# Patient Record
Sex: FEMALE | Race: WHITE | NOT HISPANIC OR LATINO | Employment: OTHER | URBAN - METROPOLITAN AREA
[De-identification: names, ages, dates, MRNs, and addresses within clinical notes are randomized per-mention and may not be internally consistent; named-entity substitution may affect disease eponyms.]

---

## 2017-03-22 ENCOUNTER — HOSPITAL ENCOUNTER (OUTPATIENT)
Dept: CT IMAGING | Facility: CLINIC | Age: 47
Discharge: HOME | End: 2017-03-22
Attending: NURSE PRACTITIONER | Admitting: NURSE PRACTITIONER
Payer: COMMERCIAL

## 2017-03-22 ENCOUNTER — OFFICE VISIT (OUTPATIENT)
Dept: FAMILY MEDICINE | Facility: CLINIC | Age: 47
End: 2017-03-22
Payer: COMMERCIAL

## 2017-03-22 VITALS
OXYGEN SATURATION: 97 % | TEMPERATURE: 98.5 F | SYSTOLIC BLOOD PRESSURE: 133 MMHG | BODY MASS INDEX: 20.14 KG/M2 | WEIGHT: 118.7 LBS | DIASTOLIC BLOOD PRESSURE: 92 MMHG | HEART RATE: 70 BPM

## 2017-03-22 DIAGNOSIS — R10.31 RLQ ABDOMINAL PAIN: ICD-10-CM

## 2017-03-22 DIAGNOSIS — R10.31 RLQ ABDOMINAL PAIN: Primary | ICD-10-CM

## 2017-03-22 LAB
BASOPHILS # BLD AUTO: 0 10E9/L (ref 0–0.2)
BASOPHILS NFR BLD AUTO: 0.1 %
DIFFERENTIAL METHOD BLD: NORMAL
EOSINOPHIL # BLD AUTO: 0.1 10E9/L (ref 0–0.7)
EOSINOPHIL NFR BLD AUTO: 1.3 %
ERYTHROCYTE [DISTWIDTH] IN BLOOD BY AUTOMATED COUNT: 12.8 % (ref 10–15)
HCT VFR BLD AUTO: 39.5 % (ref 35–47)
HGB BLD-MCNC: 13.2 G/DL (ref 11.7–15.7)
LYMPHOCYTES # BLD AUTO: 1.7 10E9/L (ref 0.8–5.3)
LYMPHOCYTES NFR BLD AUTO: 21 %
MCH RBC QN AUTO: 30.5 PG (ref 26.5–33)
MCHC RBC AUTO-ENTMCNC: 33.4 G/DL (ref 31.5–36.5)
MCV RBC AUTO: 91 FL (ref 78–100)
MONOCYTES # BLD AUTO: 0.7 10E9/L (ref 0–1.3)
MONOCYTES NFR BLD AUTO: 8.3 %
NEUTROPHILS # BLD AUTO: 5.7 10E9/L (ref 1.6–8.3)
NEUTROPHILS NFR BLD AUTO: 69.3 %
PLATELET # BLD AUTO: 266 10E9/L (ref 150–450)
RBC # BLD AUTO: 4.33 10E12/L (ref 3.8–5.2)
WBC # BLD AUTO: 8.2 10E9/L (ref 4–11)

## 2017-03-22 PROCEDURE — 36415 COLL VENOUS BLD VENIPUNCTURE: CPT | Performed by: NURSE PRACTITIONER

## 2017-03-22 PROCEDURE — 74177 CT ABD & PELVIS W/CONTRAST: CPT

## 2017-03-22 PROCEDURE — 25500064 ZZH RX 255 OP 636: Performed by: NURSE PRACTITIONER

## 2017-03-22 PROCEDURE — 25000125 ZZHC RX 250: Performed by: NURSE PRACTITIONER

## 2017-03-22 PROCEDURE — 85025 COMPLETE CBC W/AUTO DIFF WBC: CPT | Performed by: NURSE PRACTITIONER

## 2017-03-22 PROCEDURE — 99214 OFFICE O/P EST MOD 30 MIN: CPT | Performed by: NURSE PRACTITIONER

## 2017-03-22 RX ORDER — IOPAMIDOL 755 MG/ML
100 INJECTION, SOLUTION INTRAVASCULAR ONCE
Status: COMPLETED | OUTPATIENT
Start: 2017-03-22 | End: 2017-03-22

## 2017-03-22 RX ADMIN — SODIUM CHLORIDE 55 ML: 9 INJECTION, SOLUTION INTRAVENOUS at 15:13

## 2017-03-22 RX ADMIN — IOPAMIDOL 58 ML: 755 INJECTION, SOLUTION INTRAVENOUS at 15:13

## 2017-03-22 NOTE — NURSING NOTE
"Chief Complaint   Patient presents with     Abdominal Pain       Initial BP (!) 133/92 (BP Location: Left arm, Patient Position: Chair, Cuff Size: Adult Regular)  Pulse 70  Temp 98.5  F (36.9  C) (Oral)  Wt 118 lb 11.2 oz (53.8 kg)  LMP 11/05/2012  SpO2 97%  BMI 20.14 kg/m2 Estimated body mass index is 20.14 kg/(m^2) as calculated from the following:    Height as of 11/17/16: 5' 4.37\" (1.635 m).    Weight as of this encounter: 118 lb 11.2 oz (53.8 kg).  Medication Reconciliation: complete       Guille King MA      "

## 2017-03-22 NOTE — MR AVS SNAPSHOT
After Visit Summary   3/22/2017    Gerardo Mandujano    MRN: [id number]           Patient Information     Date Of Birth          1970        Visit Information        Provider Department      3/22/2017 11:00 AM Liana Goyal APRN [facility]        Today's Diagnoses     RLQ abdominal pain    -  1       Follow-ups after your visit        Your next 10 appointments already scheduled     Mar 22, 2017  3:00 PM CDT   CT ABDOMEN W/O & W CONTRAST with URCT2   [facility], [place], Radiology ([facility])    [address] [place] [id number] [phone]           Please bring any scans or X-rays taken at other hospitals, if similar tests were done. Also bring a list of your medicines, including vitamins, minerals and over-the-counter drugs. It is safest to leave personal items at home.  Be sure to tell your doctor:   If you have any allergies.   If there s any chance you are pregnant.   If you are breastfeeding.   If you have any special needs.  You may have contrast for this exam. To prepare:   Do not eat or drink for 2 hours before your exam. If you need to take medicine, you may take it with small sips of water. (We may ask you to take liquid medicine as well.)   The day before your exam, drink extra fluids at least six 8-ounce glasses (unless your doctor tells you to restrict your fluids).  Patients over 70 or patients with diabetes or kidney problems:   If you haven t had a blood test (creatinine test) within the last 30 days, go to your clinic or Diagnostic Imaging Department for this test.  If you have diabetes:   If your kidney function is normal, continue taking your metformin (Avandamet, Glucophage, Glucovance, Metaglip) on the day of your exam.   If your kidney function is abnormal, wait 48 hours before restarting this medicine.  You will have oral contrast for this exam:   You will drink the contrast at home. Get this  from your clinic or Diagnostic Imaging Department. Please follow the directions given.  Please wear loose clothing, such as a sweat suit or jogging clothes. Avoid snaps, zippers and other metal. We may ask you to undress and put on a hospital gown.  If you have any questions, please call the Imaging Department where you will have your exam.              Future tests that were ordered for you today     Open Future Orders        Priority Expected Expires Ordered    CT Abdomen Pelvis w Contrast Routine  3/22/2018 3/22/2017            Who to contact     If you have questions or need follow up information about today's clinic visit or your schedule please contact [facility] [place] [place] directly at [phone].  Normal or non-critical lab and imaging results will be communicated to you by [employer], letter or phone within 4 business days after the clinic has received the results. If you do not hear from us within 7 days, please contact the clinic through [employer] or phone. If you have a critical or abnormal lab result, we will notify you by phone as soon as possible.  Submit refill requests through [employer] or call your pharmacy and they will forward the refill request to us. Please allow 3 business days for your refill to be completed.          Additional Information About Your Visit        [employer] OTR Information     [employer] gives you secure access to your electronic health record. If you see a primary care provider, you can also send messages to your care team and make appointments. If you have questions, please call your primary care clinic.  If you do not have a primary care provider, please call [phone] and they will assist you.        Care EveryWhere ID     This is your Care EveryWhere ID. This could be used by other organizations to access your [place] medical records  [id number]        Your Vitals Were     Pulse Temperature Last Period Pulse Oximetry BMI (Body Mass Index)       70 98.5  F (36.9  C) (Oral)  11/05/2012 97% 20.14 kg/m2        Blood Pressure from Last 3 Encounters:   03/22/17 (!) 133/92   11/17/16 136/87   10/02/15 112/68    Weight from Last 3 Encounters:   03/22/17 118 lb 11.2 oz (53.8 kg)   11/17/16 116 lb 14.4 oz (53 kg)   10/02/15 119 lb (54 kg)              We Performed the Following     CBC with platelets differential     JUST IN CASE        Primary Care Provider Office Phone # Fax #    Polina Joiner [phone]       [facility] [address] [place] [address]        Thank you!     Thank you for choosing [facility] [place]  for your care. Our goal is always to provide you with excellent care. Hearing back from our patients is one way we can continue to improve our services. Please take a few minutes to complete the written survey that you may receive in the mail after your visit with us. Thank you!             Your Updated Medication List - Protect others around you: Learn how to safely use, store and throw away your medicines at www.disposemymeds.org.          This list is accurate as of: 3/22/17  1:55 PM.  Always use your most recent med list.                   Brand Name Dispense Instructions for use    CALCIUM 1200 PO      Take  by mouth.       FERROUS GLUCONATE      Reported on 3/22/2017       ibuprofen 200 MG capsule      Take 200 mg by mouth as needed       VITAMIN D (CHOLECALCIFEROL) PO      Take  by mouth daily.

## 2017-03-22 NOTE — PROGRESS NOTES
SUBJECTIVE:                                                    Gerardo Mandujano is a 46 year old female who presents to clinic today for the following health issues:    ABDOMINAL PAIN     Onset: Monday March 20, 2017    Description:   Character: intense sharp and burning; coming and going  Location: right lower quadrant pelvic region  Radiation: None    Intensity: moderate - severe - did have moments of doubling over    Progression of Symptoms:  same    Accompanying Signs & Symptoms:  Fever/Chills: no   Gas/Bloating: no   Nausea: no   Vomitting: no   Diarrhea: YES- did have a loose bowel movement yesterday   Constipation:no   Dysuria or Hematuria: no   Fatigue: yes   History:   Trauma: no   Previous similar pain: YES   Previous tests done: none    Precipitating factors:   Does the pain change with:     Food: no      BM: no     Urination: no     Hysterectomy - previous MD thought might be muscle or ligament related    Alleviating factors:  None    Therapies Tried and outcome: Advil over the counter medication    LMP:  not applicable - hysterectomy - has ovaries and cervix     Questions/Concerns: None    Problem list and histories reviewed & adjusted, as indicated.  Additional history: as documented      Reviewed and updated as needed this visit by clinical staff       Reviewed and updated as needed this visit by Provider       ROS:  Constitutional, HEENT, cardiovascular, pulmonary, gi and gu systems are negative, except as otherwise noted.    OBJECTIVE:                                                    BP (!) 133/92 (BP Location: Left arm, Patient Position: Chair, Cuff Size: Adult Regular)  Pulse 70  Temp 98.5  F (36.9  C) (Oral)  Wt 118 lb 11.2 oz (53.8 kg)  LMP 11/05/2012  SpO2 97%  BMI 20.14 kg/m2  Body mass index is 20.14 kg/(m^2).  GENERAL:alert and fatigued  NECK: no adenopathy  RESP: lungs clear to auscultation - no rales, rhonchi or wheezes  CV: regular rate and rhythm, normal S1 S2, no S3 or S4, no  murmur, click or rub, no peripheral edema and peripheral pulses strong  ABDOMEN: tenderness RLQ, positive McBurney's, no organomegaly or masses, liver span normal to percussion and bowel sounds normal  SKIN: no suspicious lesions or rashes; diaphoretic and warm to touch  PSYCH: mentation appears normal, affect normal/bright    Diagnostic Test Results:  Results for orders placed or performed in visit on 03/22/17 (from the past 24 hour(s))   CBC with platelets differential   Result Value Ref Range    WBC 8.2 4.0 - 11.0 10e9/L    RBC Count 4.33 3.8 - 5.2 10e12/L    Hemoglobin 13.2 11.7 - 15.7 g/dL    Hematocrit 39.5 35.0 - 47.0 %    MCV 91 78 - 100 fl    MCH 30.5 26.5 - 33.0 pg    MCHC 33.4 31.5 - 36.5 g/dL    RDW 12.8 10.0 - 15.0 %    Platelet Count 266 150 - 450 10e9/L    Diff Method Automated Method     % Neutrophils 69.3 %    % Lymphocytes 21.0 %    % Monocytes 8.3 %    % Eosinophils 1.3 %    % Basophils 0.1 %    Absolute Neutrophil 5.7 1.6 - 8.3 10e9/L    Absolute Lymphocytes 1.7 0.8 - 5.3 10e9/L    Absolute Monocytes 0.7 0.0 - 1.3 10e9/L    Absolute Eosinophils 0.1 0.0 - 0.7 10e9/L    Absolute Basophils 0.0 0.0 - 0.2 10e9/L     CT pending     ASSESSMENT/PLAN:                                                      (R10.31) RLQ abdominal pain  (primary encounter diagnosis)  Comment:   Plan: CBC with platelets differential, JUST IN CASE,         CT Abdomen Pelvis w Contrast, CANCELED: CT         Abdomen w/o & w Contrast  Concern for acute appendicitis.  Discussed with the patient.  Less likely IBD, obstruction, or ovarian torsion.  No leukocytosis on CBC.  Same day CT pending.    MANUEL Lopez [facility]

## 2017-03-23 ENCOUNTER — MYC MEDICAL ADVICE (OUTPATIENT)
Dept: FAMILY MEDICINE | Facility: CLINIC | Age: 47
End: 2017-03-23

## 2017-03-23 ENCOUNTER — OFFICE VISIT (OUTPATIENT)
Dept: FAMILY MEDICINE | Facility: CLINIC | Age: 47
End: 2017-03-23
Payer: COMMERCIAL

## 2017-03-23 ENCOUNTER — HOSPITAL ENCOUNTER (OUTPATIENT)
Dept: ULTRASOUND IMAGING | Facility: CLINIC | Age: 47
Discharge: HOME | End: 2017-03-23
Attending: NURSE PRACTITIONER | Admitting: NURSE PRACTITIONER
Payer: COMMERCIAL

## 2017-03-23 DIAGNOSIS — R10.31 RLQ ABDOMINAL PAIN: ICD-10-CM

## 2017-03-23 DIAGNOSIS — R10.31 RLQ ABDOMINAL PAIN: Primary | ICD-10-CM

## 2017-03-23 PROCEDURE — 99214 OFFICE O/P EST MOD 30 MIN: CPT | Performed by: NURSE PRACTITIONER

## 2017-03-23 PROCEDURE — 76830 TRANSVAGINAL US NON-OB: CPT

## 2017-03-23 NOTE — PROGRESS NOTES
SUBJECTIVE:                                                    Gerardo Mandujano is a 46 year old female who presents to clinic today for the following health issues:      ABDOMINAL PAIN     Onset: 4 days ago    Change in symptoms: improvement    Description:   Character: Dull ache and Gnawing  Location: right lower quadrant  Radiation: None    Intensity: mild    Progression of Symptoms:  improving    Accompanying Signs & Symptoms:  Fever/Chills?: no   Gas/Bloating: no   Nausea: no   Vomitting: no   Diarrhea?: no   Constipation:no   Dysuria or Hematuria: no    History:   Trauma: no   Previous similar pain: no    Previous tests done: CT    Precipitating factors:   Does the pain change with:     Food: no      BM: no     Urination: no     Alleviating factors:  None known    Therapies Tried and outcome: ibuprofen    LMP:  not applicable - hysterectomy with ovaries and cervix intact       Problem list and histories reviewed & adjusted, as indicated.  Additional history: as documented    Reviewed and updated as needed this visit by clinical staff       Reviewed and updated as needed this visit by Provider         ROS:  Constitutional, HEENT, cardiovascular, pulmonary, gi and gu systems are negative, except as otherwise noted.    OBJECTIVE:                                                    /87  Pulse 78  Temp 96.4  F (35.8  C)  Wt 122 lb 11.2 oz (55.7 kg)  LMP 11/05/2012  SpO2 99%  BMI 20.82 kg/m2  Body mass index is 20.82 kg/(m^2).  GENERAL: healthy, alert and no distress  NECK: no adenopathy, no asymmetry, masses, or scars and thyroid normal to palpation  RESP: lungs clear to auscultation - no rales, rhonchi or wheezes  CV: regular rate and rhythm, normal S1 S2, no S3 or S4, no murmur, click or rub, no peripheral edema and peripheral pulses strong  ABDOMEN: tenderness RLQ, no organomegaly or masses, liver span normal to percussion, bowel sounds normal and no palpable or pulsatile masses  SKIN: no suspicious  lesions or rashes  PSYCH: mentation appears normal, affect normal/bright    Diagnostic Test Results:  HISTORY: Right lower quadrant pain     TECHNIQUE: The pelvis was scanned in standard fashion with  transabdominal and transvaginal transducer(s) using both grey scale  and color Doppler techniques.     FINDINGS  Postsurgical changes of supracervical hysterectomy. There is trace  simple free fluid in the pelvis.     The right ovary measures 2.1 x 2 x 2.5 cm and the left ovary measures  2.8 x 1.6 x 2.2 cm. There is no adnexal mass. Subcentimeter follicles  seen in both ovaries.          IMPRESSION:   Trace free fluid in the pelvis. Normal ultrasound of both ovaries.     ASSESSMENT/PLAN:                                                        (R10.31) RLQ abdominal pain  (primary encounter diagnosis)  Comment:   Plan: US Pelvic Complete with Transvaginal        CT showed signs of inflammation without appendicitis - suggested possible ruptured ovarian cyst.  Patient's symptoms have improved, but she would like to pursue the pelvic US to rule out ovarian cyst.  Discussed with OB-GYN - not typical clinical picture with chills and malaise. Pelvic US did not have free fluid, suggesting no ovarian cyst.  Patient course is improving.  Monitor to resolution.  If worsening, see general surgery.    See Patient Instructions    MANULE Lopez [facility]

## 2017-03-23 NOTE — MR AVS SNAPSHOT
After Visit Summary   3/23/2017    Gerardo Mandujano    MRN: [id number]           Patient Information     Date Of Birth          1970        Visit Information        Provider Department      3/23/2017 1:00 PM Liana Goyal APRN [facility]        Today's Diagnoses     RLQ abdominal pain    -  1      Care Instructions    Ultrasound today  I'll review it with OB-GYN  Likely no follow-up unless symptoms do not resolve or you have new or worsening symptoms      Understanding Ovarian Cysts  An ovarian cyst is a fluid-filled sac that forms on or inside an ovary. The ovaries are a pair of small, oval-shaped organs in the lower part of a woman s belly (abdomen). About once a month, one of the ovaries releases an egg. The ovaries also make the hormones estrogen and progesterone. These hormones are part of pregnancy, the menstrual cycle, and breast growth.  Ovarian cysts are very common in women of all ages. Young girls can also get them, but this is less common. There are different types of ovarian cysts. They can occur for various reasons, and they may need different treatments. A cyst can vary in size from half an inch to more than 4 inches.  Types of ovarian cysts  There are different types of ovarian cysts:  Functional cyst  This is the most common type of ovarian cyst. They only occur in women who haven t gone through menopause. There are 2 types of functional cysts:    Follicular cyst. This cyst happens when an egg isn t released and it keeps growing inside the ovary.    Corpus luteum cyst. This type of cyst occurs when the sac around the egg doesn t dissolve after the egg is released.  Endometrioma  This is a cyst filled with old blood and tissue from the lining of the uterus. They are often called chocolate cysts because of their dark color. They can happen in women with endometriosis.  Dermoid cyst  This cyst develops from ovarian cells and eggs. They may have hair, skin, or fat  in them. These cysts are common in women of childbearing age.  What causes ovarian cysts?  Cysts can also be caused by:    Polycystic ovary syndrome (PCOS), a condition that causes multiple cysts on the ovaries    Pregnancy    Severe pelvic infection, such as chlamydia    Noncancerous growths    Cancer (rare)    Using fertility medicine to cause ovulation, such as clomiphene  Symptoms of an ovarian cyst  Many women don t have any symptoms from the cyst. In women with symptoms, the most common is pain or pressure in your lower belly on the side of the cyst. This pain may be dull or sharp, and it may come and go. A cyst that breaks open (ruptures) may lead to sudden, sharp pain.  Other symptoms of an ovarian cyst can include:    Pain in the lower back or thighs    Trouble emptying your bladder fully    Pain during sex    Weight gain    Pain during your period    Breast tenderness    Abnormal vaginal bleeding (rare)  Diagnosing an ovarian cyst  Your primary care doctor or an obstetrics and gynecology (OB/GYN) doctor may diagnose the condition. Your doctor will ask about your health history and your symptoms. You will also have a physical exam. This will likely include a pelvic exam. During the pelvic exam, your doctor may feel the swelling on your ovary. In women with no symptoms, this is often the first sign of a cyst.  If your doctor thinks you may have an ovarian cyst, you may need tests. These can help your doctor learn the type of cyst. Tests can also help rule out other problems, such as an ectopic pregnancy. The tests may include:    Ultrasound. This test uses sound waves to view the size, shape, and location of the cyst. The test can also show if the growth is solid or filled with fluid.    MRI. This uses large magnets and a computer to create a detailed picture of the area.    Pregnancy test. This is done to check if pregnancy may be the cause of the cyst.    Blood tests. These check for hormone problems and  cancer. They also check if the cyst is bleeding.    Biopsy. This is a test where a tiny piece of the ovary is taken. The piece is examined in a lab for cancer cells. This may be done if an ultrasound shows a certain type of growth on the ovary.    7522-6998 [employer]. [address] [place] [address]. All rights reserved. This information is not intended as a substitute for professional medical care. Always follow your healthcare professional's instructions.        Treatment for Ovarian Cysts  An ovarian cyst is a fluid-filled sac that forms on or inside an ovary. The ovaries are a pair of small, oval-shaped organs in the lower part of a woman s belly (abdomen). About once a month, one of the ovaries releases an egg. The ovaries also make the hormones estrogen and progesterone. These hormones are part of pregnancy, the menstrual cycle, and breast growth.  Ovarian cysts are very common in women of all ages. Young girls can also get them, but this is less common. There are different types of ovarian cysts. They can occur for various reasons, and they may need different treatments. A cyst can vary in size from half an inch to more than 4 inches.  Types of treatment  Treatment for an ovarian cyst will depend on the type of cyst, your age, and your general health. Most women will not need treatment. You may be told to watch your symptoms over time. An ovarian cyst will often go away with no treatment in a few weeks or months.  In some cases, you may need to have follow-up ultrasound tests. These are to check if your cyst has gone away or is not growing. You may not need any other treatment.  If your ultrasound or blood tests show signs of cancer, your doctor may advise surgery. This is done to remove part or all of your ovary. Your doctor might also advise surgery if:    Your cyst causes ongoing pressure or pain    Your cyst appears to be growing    You have a very large cyst    You have  endometriosis and want the cyst removed to help with fertility  Can an ovarian cyst be prevented?  If you have hormone problems, your doctor may advise taking birth control pills. These may help prevent ovarian cysts. Taking antibiotics for a pelvic infection may also prevent a cyst.  Possible complications of an ovarian cyst  An ovarian cyst can sometimes break open (rupture). This may not cause any symptoms. Or it may cause sudden, sharp pain in the lower belly. A ruptured cyst can cause a lot of blood and fluid loss. This can lead to low blood pressure. In some cases, surgery may be needed.  Rarely an ovarian cyst can also cause twisting (torsion) of the fallopian tube. This can block normal blood supply to the ovary. This can lead to sudden pain and may need emergency surgery.  When to call the healthcare provider  Call your healthcare provider right away if you have any of these:    Sudden belly pain    Other severe symptoms     [id number] [employer]. [address] [place] [address]. All rights reserved. This information is not intended as a substitute for professional medical care. Always follow your healthcare professional's instructions.              Follow-ups after your visit        Who to contact     If you have questions or need follow up information about today's clinic visit or your schedule please contact [facility] at [phone].  Normal or non-critical lab and imaging results will be communicated to you by MyChart, letter or phone within 4 business days after the clinic has received the results. If you do not hear from us within 7 days, please contact the clinic through "[employer]hart or phone. If you have a critical or abnormal lab result, we will notify you by phone as soon as possible.  Submit refill requests through [employer] or call your pharmacy and they will forward the refill request to us. Please allow 3 business days for your refill to be  completed.          Additional Information About Your Visit        [employer] Information     [employer] gives you secure access to your electronic health record. If you see a primary care provider, you can also send messages to your care team and make appointments. If you have questions, please call your primary care clinic.  If you do not have a primary care provider, please call [phone] and they will assist you.        Care EveryWhere ID     This is your Care EveryWhere ID. This could be used by other organizations to access your [place] medical records  [id number]        Your Vitals Were     Pulse Temperature Last Period Pulse Oximetry BMI (Body Mass Index)       78 96.4  F (35.8  C) 11/05/2012 99% 20.82 kg/m2        Blood Pressure from Last 3 Encounters:   03/23/17 133/87   03/22/17 (!) 133/92   11/17/16 136/87    Weight from Last 3 Encounters:   03/23/17 122 lb 11.2 oz (55.7 kg)   03/22/17 118 lb 11.2 oz (53.8 kg)   11/17/16 116 lb 14.4 oz (53 kg)               Primary Care Provider Office Phone # Fax #    Polina Joiner [phone]       [facility] [address] [place] [address]        Thank you!     Thank you for choosing [facility]  for your care. Our goal is always to provide you with excellent care. Hearing back from our patients is one way we can continue to improve our services. Please take a few minutes to complete the written survey that you may receive in the mail after your visit with us. Thank you!             Your Updated Medication List - Protect others around you: Learn how to safely use, store and throw away your medicines at www.disposemymeds.org.          This list is accurate as of: 3/23/17 11:59 PM.  Always use your most recent med list.                   Brand Name Dispense Instructions for use    CALCIUM 1200 PO      Take  by mouth.       FERROUS GLUCONATE      Reported on 3/22/2017       ibuprofen 200 MG capsule      Take 200 mg by mouth  as needed       VITAMIN D (CHOLECALCIFEROL) PO      Take  by mouth daily.

## 2017-03-23 NOTE — PATIENT INSTRUCTIONS
Ultrasound today  I'll review it with OB-GYN  Likely no follow-up unless symptoms do not resolve or you have new or worsening symptoms      Understanding Ovarian Cysts  An ovarian cyst is a fluid-filled sac that forms on or inside an ovary. The ovaries are a pair of small, oval-shaped organs in the lower part of a woman s belly (abdomen). About once a month, one of the ovaries releases an egg. The ovaries also make the hormones estrogen and progesterone. These hormones are part of pregnancy, the menstrual cycle, and breast growth.  Ovarian cysts are very common in women of all ages. Young girls can also get them, but this is less common. There are different types of ovarian cysts. They can occur for various reasons, and they may need different treatments. A cyst can vary in size from half an inch to more than 4 inches.  Types of ovarian cysts  There are different types of ovarian cysts:  Functional cyst  This is the most common type of ovarian cyst. They only occur in women who haven t gone through menopause. There are 2 types of functional cysts:    Follicular cyst. This cyst happens when an egg isn t released and it keeps growing inside the ovary.    Corpus luteum cyst. This type of cyst occurs when the sac around the egg doesn t dissolve after the egg is released.  Endometrioma  This is a cyst filled with old blood and tissue from the lining of the uterus. They are often called chocolate cysts because of their dark color. They can happen in women with endometriosis.  Dermoid cyst  This cyst develops from ovarian cells and eggs. They may have hair, skin, or fat in them. These cysts are common in women of childbearing age.  What causes ovarian cysts?  Cysts can also be caused by:    Polycystic ovary syndrome (PCOS), a condition that causes multiple cysts on the ovaries    Pregnancy    Severe pelvic infection, such as chlamydia    Noncancerous growths    Cancer (rare)    Using fertility medicine to cause ovulation,  such as clomiphene  Symptoms of an ovarian cyst  Many women don t have any symptoms from the cyst. In women with symptoms, the most common is pain or pressure in your lower belly on the side of the cyst. This pain may be dull or sharp, and it may come and go. A cyst that breaks open (ruptures) may lead to sudden, sharp pain.  Other symptoms of an ovarian cyst can include:    Pain in the lower back or thighs    Trouble emptying your bladder fully    Pain during sex    Weight gain    Pain during your period    Breast tenderness    Abnormal vaginal bleeding (rare)  Diagnosing an ovarian cyst  Your primary care doctor or an obstetrics and gynecology (OB/GYN) doctor may diagnose the condition. Your doctor will ask about your health history and your symptoms. You will also have a physical exam. This will likely include a pelvic exam. During the pelvic exam, your doctor may feel the swelling on your ovary. In women with no symptoms, this is often the first sign of a cyst.  If your doctor thinks you may have an ovarian cyst, you may need tests. These can help your doctor learn the type of cyst. Tests can also help rule out other problems, such as an ectopic pregnancy. The tests may include:    Ultrasound. This test uses sound waves to view the size, shape, and location of the cyst. The test can also show if the growth is solid or filled with fluid.    MRI. This uses large magnets and a computer to create a detailed picture of the area.    Pregnancy test. This is done to check if pregnancy may be the cause of the cyst.    Blood tests. These check for hormone problems and cancer. They also check if the cyst is bleeding.    Biopsy. This is a test where a tiny piece of the ovary is taken. The piece is examined in a lab for cancer cells. This may be done if an ultrasound shows a certain type of growth on the ovary.    6334-1513 [employer]. [address] [place] [address], [place] [address]. All rights reserved. This  information is not intended as a substitute for professional medical care. Always follow your healthcare professional's instructions.        Treatment for Ovarian Cysts  An ovarian cyst is a fluid-filled sac that forms on or inside an ovary. The ovaries are a pair of small, oval-shaped organs in the lower part of a woman s belly (abdomen). About once a month, one of the ovaries releases an egg. The ovaries also make the hormones estrogen and progesterone. These hormones are part of pregnancy, the menstrual cycle, and breast growth.  Ovarian cysts are very common in women of all ages. Young girls can also get them, but this is less common. There are different types of ovarian cysts. They can occur for various reasons, and they may need different treatments. A cyst can vary in size from half an inch to more than 4 inches.  Types of treatment  Treatment for an ovarian cyst will depend on the type of cyst, your age, and your general health. Most women will not need treatment. You may be told to watch your symptoms over time. An ovarian cyst will often go away with no treatment in a few weeks or months.  In some cases, you may need to have follow-up ultrasound tests. These are to check if your cyst has gone away or is not growing. You may not need any other treatment.  If your ultrasound or blood tests show signs of cancer, your doctor may advise surgery. This is done to remove part or all of your ovary. Your doctor might also advise surgery if:    Your cyst causes ongoing pressure or pain    Your cyst appears to be growing    You have a very large cyst    You have endometriosis and want the cyst removed to help with fertility  Can an ovarian cyst be prevented?  If you have hormone problems, your doctor may advise taking birth control pills. These may help prevent ovarian cysts. Taking antibiotics for a pelvic infection may also prevent a cyst.  Possible complications of an ovarian cyst  An ovarian cyst can sometimes  break open (rupture). This may not cause any symptoms. Or it may cause sudden, sharp pain in the lower belly. A ruptured cyst can cause a lot of blood and fluid loss. This can lead to low blood pressure. In some cases, surgery may be needed.  Rarely an ovarian cyst can also cause twisting (torsion) of the fallopian tube. This can block normal blood supply to the ovary. This can lead to sudden pain and may need emergency surgery.  When to call the healthcare provider  Call your healthcare provider right away if you have any of these:    Sudden belly pain    Other severe symptoms     1529-3119 [employer]. [address] [place] [address], [place] [address]. All rights reserved. This information is not intended as a substitute for professional medical care. Always follow your healthcare professional's instructions.

## 2017-03-24 NOTE — TELEPHONE ENCOUNTER
Rupa,     Patient is requesting recent CT results.       Thank you,  Violeta Andersen RN  [facility]

## 2017-03-29 VITALS
WEIGHT: 122.7 LBS | BODY MASS INDEX: 20.82 KG/M2 | SYSTOLIC BLOOD PRESSURE: 133 MMHG | OXYGEN SATURATION: 99 % | TEMPERATURE: 96.4 F | DIASTOLIC BLOOD PRESSURE: 87 MMHG | HEART RATE: 78 BPM

## 2017-03-30 ENCOUNTER — MYC MEDICAL ADVICE (OUTPATIENT)
Dept: FAMILY MEDICINE | Facility: CLINIC | Age: 47
End: 2017-03-30

## 2017-03-30 NOTE — TELEPHONE ENCOUNTER
Rupa,     Please see patient's mychart message below.       Violeta Andersen RN  [facility]

## 2017-11-16 ENCOUNTER — TELEPHONE (OUTPATIENT)
Dept: FAMILY MEDICINE | Facility: CLINIC | Age: 47
End: 2017-11-16

## 2017-11-16 NOTE — TELEPHONE ENCOUNTER
Last mammogram 11/30/2016 - hx benign biopsies - annual mammogram recommended    Last PAP 10/02/2015 - normal - repeat recommended in 5 years    Sent mychart as per below with health maintenance recommendations    Hever Perez RN

## 2017-11-16 NOTE — TELEPHONE ENCOUNTER
Message       Would you please look at this one     ----- Message -----        From: Gerardo Mandujano        Sent: 11/16/2017   2:35 PM          To: Rd Reception     Subject: question for Rupa re: mammogram                       Hi Rupa,      I got my reminder for my annual mammogram, and vaguely remember something about not doing them every year - or was that pap smears?  Could you refresh my memory on how frequently I should xray my mammaries?  (ha lowry)     Thanks,     Gerardo

## 2017-12-05 NOTE — PATIENT INSTRUCTIONS
Preventive Health Recommendations  Female Ages 40 to 49    Yearly exam:     See your health care provider every year in order to  1. Review health changes.   2. Discuss preventive care.    3. Review your medicines if your doctor prescribed any.      Get a Pap test every three years (unless you have an abnormal result and your provider advises testing more often).      If you get Pap tests with HPV test, you only need to test every 5 years, unless you have an abnormal result. You do not need a Pap test if your uterus was removed (hysterectomy) and you have not had cancer.      You should be tested each year for STDs (sexually transmitted diseases), if you're at risk.       Ask your doctor if you should have a mammogram.      Have a colonoscopy (test for colon cancer) if someone in your family has had colon cancer or polyps before age 50.       Have a cholesterol test every 5 years.       Have a diabetes test (fasting glucose) after age 45. If you are at risk for diabetes, you should have this test every 3 years.    Shots: Get a flu shot each year. Get a tetanus shot every 10 years.     Nutrition:     Eat at least 5 servings of fruits and vegetables each day.    Eat whole-grain bread, whole-wheat pasta and brown rice instead of white grains and rice.    Talk to your provider about Calcium and Vitamin D.     Lifestyle    Exercise at least 150 minutes a week (an average of 30 minutes a day, 5 days a week). This will help you control your weight and prevent disease.    Limit alcohol to one drink per day.    No smoking.     Wear sunscreen to prevent skin cancer.    See your dentist every six months for an exam and cleaning.      Moni Yousif - [facility] [employer]  [address] [place] [address]. [address] [employer] [address]  Phone: [phone]    Karen Garcia - both individual and family  Mind-full therapy  [phone]

## 2017-12-05 NOTE — PROGRESS NOTES
SUBJECTIVE:   CC: Gerardo Mandujano is an 46 year old woman who presents for preventive health visit.     Healthy Habits:    Do you get at least three servings of calcium containing foods daily (dairy, green leafy vegetables, etc.)? yes    Amount of exercise or daily activities, outside of work: 7 day(s) per week    Problems taking medications regularly No    Medication side effects: No    Have you had an eye exam in the past two years? yes    Do you see a dentist twice per year? yes    Do you have sleep apnea, excessive snoring or daytime drowsiness?no        Today's PHQ-2 Score:   PHQ-2 (  [employer]) 2017 3/22/2017   Q1: Little interest or pleasure in doing things 0 0   Q2: Feeling down, depressed or hopeless 0 0   PHQ-2 Score 0 0   Q1: Little interest or pleasure in doing things - -   Q2: Feeling down, depressed or hopeless - -   PHQ-2 Score - -         Abuse: Current or Past(Physical, Sexual or Emotional)- No  Do you feel safe in your environment - Yes  Social History   Substance Use Topics     Smoking status: Never Smoker     Smokeless tobacco: Never Used     Alcohol use Yes      Comment: 0-1 drink per week     The patient does not drink >3 drinks per day nor >7 drinks per week.    Reviewed orders with patient.  Reviewed health maintenance and updated orders accordingly - Yes      Patient under age 50, mutual decision reflected in health maintenance.        Pertinent mammograms are reviewed under the imaging tab.  History of abnormal Pap smear: NO - age 30- 65 PAP every 3 years recommended    Reviewed and updated as needed this visit by clinical staff  Tobacco  Allergies  Meds  Med Hx  Surg Hx  Fam Hx  Soc Hx        Reviewed and updated as needed this visit by Provider        Past Medical History:   Diagnosis Date     Allergies     Seasonal     Tension headache       Past Surgical History:   Procedure Laterality Date     C ANESTH, SECTION       LAPAROSCOPIC HYSTERECTOMY SUPRACERVICAL   "12/21/2012    benign path       A lot of stress related to family dynamics.  Son's anxiety has improved, but does remain a factor.  He is attending school on average 4 days a week.  Patient's wife experiences a lot of her own stress with son's mental health and they disagree on how to handle this.  Patient is interested in family therapy.  Son's Alison  did provide a list of therapists, but patient has felt overwhelmed at calling and setting it up.  Feels her wife would respond best to a therapist who is direct, but not pushy and is not too far alternative.  Sleep - waking up often despite good hygiene.  Feels it is entirely stress related.  Would be interested in individual therapy     ROS:  C: NEGATIVE for fever, chills, change in weight  I: NEGATIVE for worrisome rashes, moles or lesions  E: NEGATIVE for vision changes or irritation  ENT: NEGATIVE for ear, mouth and throat problems  R: NEGATIVE for significant cough or SOB  B: NEGATIVE for masses, tenderness or discharge  CV: NEGATIVE for chest pain, palpitations or peripheral edema  GI: NEGATIVE for nausea, abdominal pain, heartburn, or change in bowel habits.  Continues to intermittently have strong lower right groin pain and now is able to clearly associate it with strenuous physical activity.  Would defer physical therapy at this time.  : NEGATIVE for unusual urinary or vaginal symptoms. Periods are regular.  M: NEGATIVE for significant arthralgias or myalgia  N: NEGATIVE for weakness, dizziness or paresthesias  E: NEGATIVE for temperature intolerance, skin/hair changes  H: NEGATIVE for bleeding problems  P: NEGATIVE for changes in mood or affect    OBJECTIVE:   /88  Pulse 73  Temp 98.5  F (36.9  C) (Oral)  Ht 5' 4.57\" (1.64 m)  Wt 122 lb 1.6 oz (55.4 kg)  LMP 11/05/2012  SpO2 98%  BMI 20.59 kg/m2  EXAM:  GENERAL: healthy, alert and no distress  EYES: Eyes grossly normal to inspection, PERRL and conjunctivae and sclerae " "normal  HENT: ear canals and TM's normal, nose and mouth without ulcers or lesions  NECK: no adenopathy, no asymmetry, masses, or scars and thyroid normal to palpation  RESP: lungs clear to auscultation - no rales, rhonchi or wheezes  BREAST: normal without masses, tenderness or nipple discharge and no palpable axillary masses or adenopathy  CV: regular rate and rhythm, normal S1 S2, no S3 or S4, no murmur, click or rub, no peripheral edema and peripheral pulses strong  ABDOMEN: soft, nontender, no hepatosplenomegaly, no masses and bowel sounds normal  MS: no gross musculoskeletal defects noted, no edema  SKIN: no suspicious lesions or rashes  NEURO: Normal strength and tone, mentation intact and speech normal  PSYCH: mentation appears normal, affect normal/bright/anxious  LYMPH: no cervical, supraclavicular, axillary adenopathy    ASSESSMENT/PLAN:   (F43.0) Acute reaction to stress  (primary encounter diagnosis)  Comment:   Plan: MENTAL HEALTH REFERRAL  - Adult; Outpatient         Treatment; Individual/Couples/Family/Group         Therapy/Health Psychology; [facility]: [facility] [phone]; We will         contact you to schedule the appointment or         please call with any questions           (J00.01) Encounter for routine adult medical exam with abnormal findings  Comment:   Plan:     COUNSELING:   Reviewed preventive health counseling, as reflected in patient instructions       reports that she has never smoked. She has never used smokeless tobacco.    Estimated body mass index is 20.59 kg/(m^2) as calculated from the following:    Height as of this encounter: 5' 4.57\" (1.64 m).    Weight as of this encounter: 122 lb 1.6 oz (55.4 kg).         Counseling Resources:  ATP IV Guidelines  Pooled Cohorts Equation Calculator  Breast Cancer Risk Calculator  FRAX Risk Assessment  ICSI Preventive Guidelines  Dietary Guidelines for Americans, 2010  USDA's MyPlate  ASA Prophylaxis  Lung CA " Screening    MANUEL Lopez [facility]

## 2017-12-06 ENCOUNTER — OFFICE VISIT (OUTPATIENT)
Dept: FAMILY MEDICINE | Facility: CLINIC | Age: 47
End: 2017-12-06
Payer: COMMERCIAL

## 2017-12-06 VITALS
HEIGHT: 65 IN | HEART RATE: 73 BPM | TEMPERATURE: 98.5 F | OXYGEN SATURATION: 98 % | BODY MASS INDEX: 20.34 KG/M2 | DIASTOLIC BLOOD PRESSURE: 88 MMHG | WEIGHT: 122.1 LBS | SYSTOLIC BLOOD PRESSURE: 134 MMHG

## 2017-12-06 DIAGNOSIS — F43.0 ACUTE REACTION TO STRESS: Primary | ICD-10-CM

## 2017-12-06 DIAGNOSIS — Z00.01 ENCOUNTER FOR ROUTINE ADULT MEDICAL EXAM WITH ABNORMAL FINDINGS: ICD-10-CM

## 2017-12-06 DIAGNOSIS — Z00.00 ROUTINE GENERAL MEDICAL EXAMINATION AT A HEALTH CARE FACILITY: ICD-10-CM

## 2017-12-06 PROCEDURE — 99396 PREV VISIT EST AGE 40-64: CPT | Performed by: NURSE PRACTITIONER

## 2017-12-06 PROCEDURE — 99213 OFFICE O/P EST LOW 20 MIN: CPT | Mod: 25 | Performed by: NURSE PRACTITIONER

## 2017-12-06 NOTE — MR AVS SNAPSHOT
After Visit Summary   12/6/2017    Gerardo Mandujano    MRN: [id number]           Patient Information     Date Of Birth          1970        Visit Information        Provider Department      12/6/2017 9:30 AM Liana Goyal APRN [facility]        Today's Diagnoses     Acute reaction to stress    -  1    Routine general medical examination at a health care facility        Encounter for routine adult medical exam with abnormal findings          Care Instructions      Preventive Health Recommendations  Female Ages 40 to 49    Yearly exam:     See your health care provider every year in order to  1. Review health changes.   2. Discuss preventive care.    3. Review your medicines if your doctor prescribed any.      Get a Pap test every three years (unless you have an abnormal result and your provider advises testing more often).      If you get Pap tests with HPV test, you only need to test every 5 years, unless you have an abnormal result. You do not need a Pap test if your uterus was removed (hysterectomy) and you have not had cancer.      You should be tested each year for STDs (sexually transmitted diseases), if you're at risk.       Ask your doctor if you should have a mammogram.      Have a colonoscopy (test for colon cancer) if someone in your family has had colon cancer or polyps before age 50.       Have a cholesterol test every 5 years.       Have a diabetes test (fasting glucose) after age 45. If you are at risk for diabetes, you should have this test every 3 years.    Shots: Get a flu shot each year. Get a tetanus shot every 10 years.     Nutrition:     Eat at least 5 servings of fruits and vegetables each day.    Eat whole-grain bread, whole-wheat pasta and brown rice instead of white grains and rice.    Talk to your provider about Calcium and Vitamin D.     Lifestyle    Exercise at least 150 minutes a week (an average of 30 minutes a day, 5 days a week). This will help  you control your weight and prevent disease.    Limit alcohol to one drink per day.    No smoking.     Wear sunscreen to prevent skin cancer.    See your dentist every six months for an exam and cleaning.      Moni Yousif - [facility] [employer]  [address] [place]  [place] [address]  Phone: [phone]    Karen Garcia - both individual and family  Mind-full therapy  [phone]          Follow-ups after your visit        Additional Services     MENTAL HEALTH REFERRAL  - Adult; Outpatient Treatment; Individual/Couples/Family/Group Therapy/Health Psychology; [facility]: [facility] [phone]; We will contact you to schedule the appointment or please call with any questions       All scheduling is subject to the client's specific insurance plan & benefits, provider/location availability, and provider clinical specialities.  Please arrive 15 minutes early for your first appointment and bring your completed paperwork.    **Carly Gershone - Rhett Boyd or Alissa Miller at [place];   Alexandru Kent or Heidi Srivastava at [place];   Kaitlin Gusman  [facility]      Please be aware that coverage of these services is subject to the terms and limitations of your health insurance plan.  Call member services at your health plan with any benefit or coverage questions.                  Who to contact     If you have questions or need follow up information about today's clinic visit or your schedule please contact [facility] directly at [phone].  Normal or non-critical lab and imaging results will be communicated to you by MyChart, letter or phone within 4 business days after the clinic has received the results. If you do not hear from us within 7 days, please contact the clinic through MyChart or phone. If you have a critical or abnormal lab result, we will notify you by phone as soon as possible.  Submit refill requests through  "MyChart or call your pharmacy and they will forward the refill request to us. Please allow 3 business days for your refill to be completed.          Additional Information About Your Visit        MyChart Information     [employer] gives you secure access to your electronic health record. If you see a primary care provider, you can also send messages to your care team and make appointments. If you have questions, please call your primary care clinic.  If you do not have a primary care provider, please call [phone] and they will assist you.        Care EveryWhere ID     This is your Care EveryWhere ID. This could be used by other organizations to access your [facility] medical records  [id number]        Your Vitals Were     Pulse Temperature Height Last Period Pulse Oximetry BMI (Body Mass Index)    73 98.5  F (36.9  C) (Oral) 5' 4.57\" (1.64 m) 11/05/2012 98% 20.59 kg/m2       Blood Pressure from Last 3 Encounters:   12/06/17 134/88   03/23/17 133/87   03/22/17 (!) 133/92    Weight from Last 3 Encounters:   12/06/17 122 lb 1.6 oz (55.4 kg)   03/23/17 122 lb 11.2 oz (55.7 kg)   03/22/17 118 lb 11.2 oz (53.8 kg)              We Performed the Following     MENTAL HEALTH REFERRAL  - Adult; Outpatient Treatment; Individual/Couples/Family/Group Therapy/Health Psychology; [facility]: [facility] [phone]; We will contact you to schedule the appointment or please call with any questions        Primary Care Provider Office Phone # Fax #    Polina Joiner [phone]       [facility] [address] [place] [address]        Equal Access to Services     [facility] : Hadii agnieszka Alston, amanda alcantara, qarosamaria rodríguez. So [facility] [phone].    ATENCIÓN: Si habla español, tiene a richey disposición servicios gratuitos de asistencia lingüística. Llame al [phone].    We comply with applicable federal civil rights laws " and [place] laws. We do not discriminate on the basis of race, color, national origin, age, disability, sex, sexual orientation, or gender identity.            Thank you!     Thank you for choosing [facility]  for your care. Our goal is always to provide you with excellent care. Hearing back from our patients is one way we can continue to improve our services. Please take a few minutes to complete the written survey that you may receive in the mail after your visit with us. Thank you!             Your Updated Medication List - Protect others around you: Learn how to safely use, store and throw away your medicines at www.disposemymeds.org.          This list is accurate as of: 12/6/17 10:40 AM.  Always use your most recent med list.                   Brand Name Dispense Instructions for use Diagnosis    CALCIUM 1200 PO      Take  by mouth.        FERROUS GLUCONATE      Reported on 3/22/2017        ibuprofen 200 MG capsule      Take 200 mg by mouth as needed        VITAMIN D (CHOLECALCIFEROL) PO      Take  by mouth daily.

## 2017-12-06 NOTE — NURSING NOTE
"Chief Complaint   Patient presents with     Physical       Initial BP (!) 144/92  Pulse 73  Temp 98.5  F (36.9  C) (Oral)  Ht 5' 4.57\" (1.64 m)  Wt 122 lb 1.6 oz (55.4 kg)  LMP 11/05/2012  SpO2 98%  BMI 20.59 kg/m2 Estimated body mass index is 20.59 kg/(m^2) as calculated from the following:    Height as of this encounter: 5' 4.57\" (1.64 m).    Weight as of this encounter: 122 lb 1.6 oz (55.4 kg).  Medication Reconciliation: complete     Guille King MA      "

## 2017-12-06 NOTE — Clinical Note
Thao, Anyone that you like for family therapy?  It isn't clear to me that everyone in [facility] does family therapy or just some people.  Also wondering who is really good at and likes family therapy.  Is for same sex couple, so also great if they were not homophobic.  Thanks in advance!  Sorry my one patient no showed on you.  Grrr...was it the snow day?  MELANIE Linton

## 2018-01-03 ENCOUNTER — OFFICE VISIT (OUTPATIENT)
Dept: URGENT CARE | Facility: URGENT CARE | Age: 48
End: 2018-01-03
Payer: COMMERCIAL

## 2018-01-03 ENCOUNTER — MYC MEDICAL ADVICE (OUTPATIENT)
Dept: FAMILY MEDICINE | Facility: CLINIC | Age: 48
End: 2018-01-03

## 2018-01-03 VITALS
BODY MASS INDEX: 19.99 KG/M2 | HEART RATE: 86 BPM | RESPIRATION RATE: 14 BRPM | HEIGHT: 65 IN | OXYGEN SATURATION: 98 % | TEMPERATURE: 97.6 F | SYSTOLIC BLOOD PRESSURE: 132 MMHG | DIASTOLIC BLOOD PRESSURE: 90 MMHG | WEIGHT: 120 LBS

## 2018-01-03 DIAGNOSIS — H69.92 DYSFUNCTION OF LEFT EUSTACHIAN TUBE: Primary | ICD-10-CM

## 2018-01-03 DIAGNOSIS — H61.22 EXCESSIVE EAR WAX, LEFT: ICD-10-CM

## 2018-01-03 PROCEDURE — 99213 OFFICE O/P EST LOW 20 MIN: CPT | Performed by: INTERNAL MEDICINE

## 2018-01-03 RX ORDER — FLUTICASONE PROPIONATE 50 MCG
1-2 SPRAY, SUSPENSION (ML) NASAL DAILY
Qty: 1 BOTTLE | Refills: 0 | Status: SHIPPED | OUTPATIENT
Start: 2018-01-03 | End: 2021-07-20

## 2018-01-03 ASSESSMENT — ENCOUNTER SYMPTOMS
SORE THROAT: 0
COUGH: 0
FEVER: 0

## 2018-01-03 NOTE — MR AVS SNAPSHOT
After Visit Summary   1/3/2018    Gerardo Mandujano    MRN: [id number]           Patient Information     Date Of Birth          1970        Visit Information        Provider Department      1/3/2018 5:10 PM Provider, [facility]        Today's Diagnoses     Dysfunction of left eustachian tube    -  1    Excessive ear wax, left          Care Instructions    flonase 1-2 sprays day  Humidifier  Menthol cough drops    Ear irrigation    Recheck with primary if not improved in 2 weeks.                  Follow-ups after your visit        Your next 10 appointments already scheduled     Jan 05, 2018  3:20 PM CST   SHORT with Smiley Yee PA-C   [facility] ([facility])    [address] [place] [address] [address]  [facility] [id number] [phone]            Feb 09, 2018 11:30 AM CST   New Visit with Carly Gershone, [facility] ([facility])    [address] [facility] [address] [phone]            Feb 23, 2018 10:00 AM CST   Return Visit with Carly Gershone, [facility] ([facility])    [facility] [facility] [address] [phone]              Who to contact     If you have questions or need follow up information about today's clinic visit or your schedule please contact [facility] directly at [phone].  Normal or non-critical lab and imaging results will be communicated to you by MyChart, letter or phone within 4 business days after the clinic has received the results. If you do not hear from us within 7 days, please contact the clinic through MyChart or phone. If you have a critical or abnormal lab result, we will notify you by phone as soon as possible.  Submit refill requests through Whisk or call your pharmacy and they will forward the refill request to us. Please allow 3 business days for your refill  "to be completed.          Additional Information About Your Visit        Endorphinhart Information     Shop[employer]ially gives you secure access to your electronic health record. If you see a primary care provider, you can also send messages to your care team and make appointments. If you have questions, please call your primary care clinic.  If you do not have a primary care provider, please call [phone] and they will assist you.        Care EveryWhere ID     This is your Care EveryWhere ID. This could be used by other organizations to access your [facility] medical records  [id number]        Your Vitals Were     Pulse Temperature Respirations Height Last Period Pulse Oximetry    86 97.6  F (36.4  C) (Oral) 14 5' 4.5\" (1.638 m) 11/05/2012 98%    BMI (Body Mass Index)                   20.28 kg/m2            Blood Pressure from Last 3 Encounters:   01/03/18 132/90   12/06/17 134/88   03/23/17 133/87    Weight from Last 3 Encounters:   01/03/18 120 lb (54.4 kg)   12/06/17 122 lb 1.6 oz (55.4 kg)   03/23/17 122 lb 11.2 oz (55.7 kg)              We Performed the Following     HC REMOVAL IMPACTED CERUMEN IRRIGATION/LVG UNILAT          Today's Medication Changes          These changes are accurate as of: 1/3/18  5:24 PM.  If you have any questions, ask your nurse or doctor.               Start taking these medicines.        Dose/Directions    fluticasone 50 MCG/ACT spray   Commonly known as:  FLONASE   Used for:  Dysfunction of left eustachian tube   Started by:  Provider, [facility]        Dose:  1-2 spray   Spray 1-2 sprays into both nostrils daily   Quantity:  1 Bottle   Refills:  0            Where to get your medicines      These medications were sent to [facility] - [place], [place] - [address]  [address], [place] [address]     Phone:  [phone]     fluticasone 50 MCG/ACT spray                Primary Care Provider Office Phone # Fax #    Polina Joiner [phone] " [phone]       [facility] [address] [place] [address]        Equal Access to Services     YESSENIACYNTHIA ALEXI : Hadii agnieszka Alston, amanda alcantara, jian martinezpavithrastephanie smith, rosamaria agarwaledmunddanisha jay. So [facility] [phone].    ATENCIÓN: Si habla español, tiene a richey disposición servicios gratuitos de asistencia lingüística. Jesicaame al [phone].    We comply with applicable federal civil rights laws and [place] laws. We do not discriminate on the basis of race, color, national origin, age, disability, sex, sexual orientation, or gender identity.            Thank you!     Thank you for choosing [facility]  for your care. Our goal is always to provide you with excellent care. Hearing back from our patients is one way we can continue to improve our services. Please take a few minutes to complete the written survey that you may receive in the mail after your visit with us. Thank you!             Your Updated Medication List - Protect others around you: Learn how to safely use, store and throw away your medicines at www.disposemymeds.org.          This list is accurate as of: 1/3/18  5:24 PM.  Always use your most recent med list.                   Brand Name Dispense Instructions for use Diagnosis    CALCIUM 1200 PO      Take  by mouth.        FERROUS GLUCONATE      Reported on 3/22/2017        fluticasone 50 MCG/ACT spray    FLONASE    1 Bottle    Spray 1-2 sprays into both nostrils daily    Dysfunction of left eustachian tube       ibuprofen 200 MG capsule      Take 200 mg by mouth as needed        VITAMIN D (CHOLECALCIFEROL) PO      Take  by mouth daily.

## 2018-01-03 NOTE — PATIENT INSTRUCTIONS
flonase 1-2 sprays day  Humidifier  Menthol cough drops    Ear irrigation    Recheck with primary if not improved in 2 weeks.

## 2018-01-03 NOTE — PROGRESS NOTES
"SUBJECTIVE:   Gerardo Mandujano is a 47 year old female presenting with a chief complaint of   Chief Complaint   Patient presents with     Ear Problem     was recently flying and left ear has been plugged since flight home. today has some discomfort.    .  treatment valsalva, chew gum, yawn, afrin    Review of Systems   Constitutional: Negative for fever.   HENT: Negative for congestion and sore throat.         Had a cold few weeks before flew on plane   Respiratory: Negative for cough.          Past Medical History:   Diagnosis Date     Allergies     Seasonal     Tension headache      Current Outpatient Prescriptions   Medication Sig Dispense Refill     fluticasone (FLONASE) 50 MCG/ACT spray Spray 1-2 sprays into both nostrils daily 1 Bottle 0     ibuprofen 200 MG capsule Take 200 mg by mouth as needed       VITAMIN D, CHOLECALCIFEROL, PO Take  by mouth daily.       Calcium Carbonate-Vit D-Min (CALCIUM 1200 PO) Take  by mouth.       FERROUS GLUCONATE Reported on 3/22/2017       Social History   Substance Use Topics     Smoking status: Never Smoker     Smokeless tobacco: Never Used     Alcohol use Yes      Comment: 0-1 drink per week       OBJECTIVE  /90  Pulse 86  Temp 97.6  F (36.4  C) (Oral)  Resp 14  Ht 5' 4.5\" (1.638 m)  Wt 120 lb (54.4 kg)  LMP 11/05/2012  SpO2 98%  BMI 20.28 kg/m2    Physical Exam   Constitutional: She is well-developed, well-nourished, and in no distress.   HENT:   Nose: Nose normal.   Mouth/Throat: Oropharynx is clear and moist. No oropharyngeal exudate.   right tympanic membrane clear  left tympanic membrane - wax overlaying      Ear irrigation  After ear irrigation she still did have some yellow wax overlying her tympanic membrane.  It did not appear infected  Labs:  No results found for this or any previous visit (from the past 24 hour(s)).        ASSESSMENT:      ICD-10-CM    1. Dysfunction of left eustachian tube H69.82 fluticasone (FLONASE) 50 MCG/ACT spray   2. Excessive " ear wax, left H61.22 HC REMOVAL IMPACTED CERUMEN IRRIGATION/LVG UNILAT        Medical Decision Making:    Differential Diagnosis:  Otitis media    Serious Comorbid Conditions:  none    PLAN:    Patient Instructions   flonase 1-2 sprays day  Humidifier  Menthol cough drops    Ear irrigation    Recheck with primary if not improved in 2 weeks.

## 2018-01-03 NOTE — NURSING NOTE
"Chief Complaint   Patient presents with     Ear Problem     was recently flying and left ear has been plugged since flight home. today has some discomfort.        Initial /90  Pulse 86  Temp 97.6  F (36.4  C) (Oral)  Resp 14  Ht 5' 4.5\" (1.638 m)  Wt 120 lb (54.4 kg)  LMP 11/05/2012  SpO2 98%  BMI 20.28 kg/m2 Estimated body mass index is 20.28 kg/(m^2) as calculated from the following:    Height as of this encounter: 5' 4.5\" (1.638 m).    Weight as of this encounter: 120 lb (54.4 kg).  Medication Reconciliation: complete  "

## 2018-01-03 NOTE — TELEPHONE ENCOUNTER
Rupa,         Please advise on patient plugged ear. Just a matter of time? Or nasal spray or decongestant ?    Thanks! Olinda Villagran RN

## 2018-10-19 ENCOUNTER — ALLIED HEALTH/NURSE VISIT (OUTPATIENT)
Dept: NURSING | Facility: CLINIC | Age: 48
End: 2018-10-19
Payer: COMMERCIAL

## 2018-10-19 DIAGNOSIS — Z23 NEED FOR PROPHYLACTIC VACCINATION AND INOCULATION AGAINST INFLUENZA: Primary | ICD-10-CM

## 2018-10-19 PROCEDURE — 90686 IIV4 VACC NO PRSV 0.5 ML IM: CPT

## 2018-10-19 PROCEDURE — 99207 ZZC NO CHARGE NURSE ONLY: CPT

## 2018-10-19 PROCEDURE — 90471 IMMUNIZATION ADMIN: CPT

## 2018-10-19 NOTE — PROGRESS NOTES

## 2018-10-19 NOTE — MR AVS SNAPSHOT
After Visit Summary   10/19/2018    Gerardo Mandujano    MRN: [id number]           Patient Information     Date Of Birth          1970        Visit Information        Provider Department      10/19/2018 3:10 PM CARE COORDINATOR [facility]        Today's Diagnoses     Need for prophylactic vaccination and inoculation against influenza    -  1       Follow-ups after your visit        Your next 10 appointments already scheduled     Nov 06, 2018 12:30 PM CST   (Arrive by 12:00 PM)   New Visit with Carly Gershone, [facility] ([place] [address] [facility] [id number] [phone]            Nov 15, 2018  3:30 PM CST   Return Visit with Carly Gershone, [facility] ([facility]    [address] [facility] [address] [id number] [phone]              Who to contact     If you have questions or need follow up information about today's clinic visit or your schedule please contact [facility] directly at [phone].  Normal or non-critical lab and imaging results will be communicated to you by PowWowHRhart, letter or phone within 4 business days after the clinic has received the results. If you do not hear from us within 7 days, please contact the clinic through [employer] or phone. If you have a critical or abnormal lab result, we will notify you by phone as soon as possible.  Submit refill requests through TATE'S LIST or call your pharmacy and they will forward the refill request to us. Please allow 3 business days for your refill to be completed.          Additional Information About Your Visit        PowWowHRhart Information     TATE'S LIST gives you secure access to your electronic health record. If you see a primary care provider, you can also send messages to your care team and make appointments. If you have questions, please call your primary care clinic.  If you do not have a  primary care provider, please call [phone] and they will assist you.        Care EveryWhere ID     This is your Care EveryWhere ID. This could be used by other organizations to access your [facility] medical records  [id number]        Your Vitals Were     Last Period                   11/05/2012            Blood Pressure from Last 3 Encounters:   01/03/18 132/90   12/06/17 134/88   03/23/17 133/87    Weight from Last 3 Encounters:   01/03/18 120 lb (54.4 kg)   12/06/17 122 lb 1.6 oz (55.4 kg)   03/23/17 122 lb 11.2 oz (55.7 kg)              We Performed the Following     FLU VACCINE, SPLIT VIRUS, IM (QUADRIVALENT) [96234]- >3 YRS     Vaccine Administration, Initial [46670]        Primary Care Provider Office Phone # Fax #    Polina Joiner [phone]       [facility] [address] [place] [address]        Equal Access to Services     MARK ANTHONY TRUONG : Hadii aad ku hadasho Soomaali, waaxda luqadaha, qaybta kaalmada adeegyada, waxay hiteshin duane murillo . So [facility] [place] [phone].    ATENCIÓN: Si habla español, tiene a richey disposición servicios gratuitos de asistencia lingüística. Llame al [phone].    We comply with applicable federal civil rights laws and [place] laws. We do not discriminate on the basis of race, color, national origin, age, disability, sex, sexual orientation, or gender identity.            Thank you!     Thank you for choosing [facility]  for your care. Our goal is always to provide you with excellent care. Hearing back from our patients is one way we can continue to improve our services. Please take a few minutes to complete the written survey that you may receive in the mail after your visit with us. Thank you!             Your Updated Medication List - Protect others around you: Learn how to safely use, store and throw away your medicines at www.disposemymeds.org.          This list is accurate as of 10/19/18  4:24 PM.   Always use your most recent med list.                   Brand Name Dispense Instructions for use Diagnosis    CALCIUM 1200 PO      Take  by mouth.        FERROUS GLUCONATE      Reported on 3/22/2017        fluticasone 50 MCG/ACT spray    FLONASE    1 Bottle    Spray 1-2 sprays into both nostrils daily    Dysfunction of left eustachian tube       ibuprofen 200 MG capsule      Take 200 mg by mouth as needed        VITAMIN D (CHOLECALCIFEROL) PO      Take  by mouth daily.

## 2018-11-17 ENCOUNTER — OFFICE VISIT (OUTPATIENT)
Dept: ORTHOPEDICS | Facility: CLINIC | Age: 48
End: 2018-11-17

## 2018-11-17 VITALS
SYSTOLIC BLOOD PRESSURE: 143 MMHG | DIASTOLIC BLOOD PRESSURE: 99 MMHG | BODY MASS INDEX: 20.28 KG/M2 | HEIGHT: 65 IN | HEART RATE: 101 BPM

## 2018-11-17 DIAGNOSIS — S39.012A STRAIN OF LUMBAR REGION, INITIAL ENCOUNTER: Primary | ICD-10-CM

## 2018-11-17 RX ORDER — CYCLOBENZAPRINE HCL 5 MG
5 TABLET ORAL
Qty: 15 TABLET | Refills: 0 | Status: SHIPPED | OUTPATIENT
Start: 2018-11-17 | End: 2019-03-06

## 2018-11-17 RX ORDER — PREDNISONE 20 MG/1
40 TABLET ORAL DAILY
Qty: 14 TABLET | Refills: 0 | Status: SHIPPED | OUTPATIENT
Start: 2018-11-17 | End: 2019-03-06

## 2018-11-17 NOTE — MR AVS SNAPSHOT
After Visit Summary   11/17/2018    Gerardo Mandujano    MRN: [id number]           Patient Information     Date Of Birth          1970        Visit Information        Provider Department      11/17/2018 11:10 AM Keny Tan MD [facility]        Today's Diagnoses     Strain of lumbar region, initial encounter    -  1       Follow-ups after your visit        Additional Services     PHYSICAL THERAPY REFERRAL (Internal)       Physical Therapy Referral                  Follow-up notes from your care team     Return in about 2 weeks (around 12/1/2018).      Your next 10 appointments already scheduled     Nov 29, 2018  4:00 PM CST   (Arrive by 3:45 PM)   Return Walk In Ortho with MD LIANNA Carson [facility] ([facility])    [address] [place] [address] [phone]              Future tests that were ordered for you today     Open Future Orders        Priority Expected Expires Ordered    PHYSICAL THERAPY REFERRAL (Internal) Routine  11/17/2019 11/17/2018            Who to contact     Please call your clinic at [phone] to:    Ask questions about your health    Make or cancel appointments    Discuss your medicines    Learn about your test results    Speak to your doctor            Additional Information About Your Visit        Right90hart Information     [facility] gives you secure access to your electronic health record. If you see a primary care provider, you can also send messages to your care team and make appointments. If you have questions, please call your primary care clinic.  If you do not have a primary care provider, please call [phone] and they will assist you.      [facility] is an electronic gateway that provides easy, online access to your medical records. With [facility], you can request a clinic appointment, read your test results, renew a prescription or communicate with your  "care team.     To access your existing account, please contact your [facility] or call [phone] for assistance.        Care EveryWhere ID     This is your Care EveryWhere ID. This could be used by other organizations to access your [facility] medical records  [id number]        Your Vitals Were     Pulse Height Last Period BMI (Body Mass Index)          101 1.638 m (5' 4.5\") 11/05/2012 20.28 kg/m2         Blood Pressure from Last 3 Encounters:   11/17/18 (!) 143/99   01/03/18 132/90   12/06/17 134/88    Weight from Last 3 Encounters:   01/03/18 54.4 kg (120 lb)   12/06/17 55.4 kg (122 lb 1.6 oz)   03/23/17 55.7 kg (122 lb 11.2 oz)                 Today's Medication Changes          These changes are accurate as of 11/17/18 11:54 AM.  If you have any questions, ask your nurse or doctor.               Start taking these medicines.        Dose/Directions    cyclobenzaprine 5 MG tablet   Commonly known as:  FLEXERIL   Used for:  Strain of lumbar region, initial encounter   Started by:  Keny Tan MD        Dose:  5 mg   Take 1 tablet (5 mg) by mouth nightly as needed for muscle spasms   Quantity:  15 tablet   Refills:  0       predniSONE 20 MG tablet   Commonly known as:  DELTASONE   Used for:  Strain of lumbar region, initial encounter   Started by:  Keny Tan MD        Dose:  40 mg   Take 2 tablets (40 mg) by mouth daily for 7 days   Quantity:  14 tablet   Refills:  0            Where to get your medicines      These medications were sent to [facility] [address] [place] [address] AT [address] [address]     Phone:  [phone]     cyclobenzaprine 5 MG tablet    predniSONE 20 MG tablet                Primary Care Provider Office Phone # Fax #    Polina Joiner [phone]       [facility] [address] [place] [address]        Equal Access to Services     MARK ANTHONY TRUONG AH: Hadii " agnieszka Alston, waellynda lukatieadaha, qaybta kaalrussel kevingavi, waxrosalee sung agarwaledmunddanisha murillo misty. So [facility] [phone].    ATENCIÓN: Si habla moni, tiene a richey disposición servicios gratuitos de asistencia lingüística. Jesicaame al [phone].    We comply with applicable federal civil rights laws and [place] laws. We do not discriminate on the basis of race, color, national origin, age, disability, sex, sexual orientation, or gender identity.            Thank you!     Thank you for choosing [facility]  for your care. Our goal is always to provide you with excellent care. Hearing back from our patients is one way we can continue to improve our services. Please take a few minutes to complete the written survey that you may receive in the mail after your visit with us. Thank you!             Your Updated Medication List - Protect others around you: Learn how to safely use, store and throw away your medicines at www.disposemymeds.org.          This list is accurate as of 11/17/18 11:54 AM.  Always use your most recent med list.                   Brand Name Dispense Instructions for use Diagnosis    CALCIUM 1200 PO      Take  by mouth.        cyclobenzaprine 5 MG tablet    FLEXERIL    15 tablet    Take 1 tablet (5 mg) by mouth nightly as needed for muscle spasms    Strain of lumbar region, initial encounter       FERROUS GLUCONATE      Reported on 3/22/2017        fluticasone 50 MCG/ACT spray    FLONASE    1 Bottle    Spray 1-2 sprays into both nostrils daily    Dysfunction of left eustachian tube       ibuprofen 200 MG capsule    ADVIL/MOTRIN     Take 200 mg by mouth as needed        predniSONE 20 MG tablet    DELTASONE    14 tablet    Take 2 tablets (40 mg) by mouth daily for 7 days    Strain of lumbar region, initial encounter       VITAMIN D (CHOLECALCIFEROL) PO      Take  by mouth daily.

## 2018-11-17 NOTE — PROGRESS NOTES
SPORTS & ORTHOPEDIC WALK-IN VISIT 11/17/2018    Primary Care Physician: Dr. Joiner    Had a twinge of pain when she went to bed last night. Middle right side. Only thing she did yesterday differently was walking her dog twice with new harness. Woke up last night with spasm in back in same area, extending further up and down spine on right side. Hard time getting comfortable again. Was able to fall asleep by laying on left side but still wasn't very comfortable. Pain hasn't gotten worse but still there, maybe a tiny bit better than when she woke up in the middle of the night. Pain with deep breathing or certain movements. Feels like she's holding her body pretty rigidly. Extends now up to her shoulder and down to her low back.  Early November she had a similar experience but in her groin area extending around to low back, has happened on and off before, PCP not sure what is causing that. Burning pain. Thinks it may be scar tissue from hysterectomy.     Reason for visit:     What part of your body is injured / painful?  right mid back    What caused the injury /pain? Unsure    How long ago did your injury occur or pain begin? yesterday    What are your most bothersome symptoms? Pain    How would you characterize your symptom?  sharp and throbing    What makes your symptoms better? Ice, heat, Ibuprofen and Tylenol - not helping    What makes your symptoms worse? Movement and Other: breathing    Have you been previously seen for this problem? No    Medical History:    Any recent changes to your medical history? No    Any new medication prescribed since last visit? No    Have you had surgery on this body part before? No    Review of Systems:    Do you have fever, chills, weight loss? No    Do you have any vision problems? No    Do you have any chest pain or edema? No    Do you have any shortness of breath or wheezing?  Yes, with sharper pain    Do you have stomach problems? No    Do you have any numbness or focal  weakness? No    Do you have diabetes? No    Do you have problems with bleeding or clotting? No    Do you have an rashes or other skin lesions? No       HPI    Ms. Mandujano is a pleasant 47 year old year old female who presents to orthopedic walk in clinic today with concerns of low back pain x1 day.  Gerardo explains that she was walking her dog yesterday on a new harness and that the dog had been pulling her.  She then woke up last night with severe low back pain and tightness.  She is continued to have this low back pain and right-sided muscle spasm this morning.  She endorses prior history of low back pain radiating around to her right hip intermittently over the last 1-2 months but states today's symptoms are different.  She denies any radicular symptoms down the right leg.  She intermittently experiences fullness in her low back.  She denies any changes in bowel or bladder function or saddle anesthesia.  Denies any lower extremity weakness.  Ibuprofen has not helped.      Medical History     Past Surgical History:   Procedure Laterality Date     C ANESTH, SECTION       LAPAROSCOPIC HYSTERECTOMY SUPRACERVICAL  2012    benign path       Past Medical History:   Diagnosis Date     Allergies     Seasonal     Tension headache        Allergies   Allergen Reactions     Chloraprep One Step Itching and Rash       Social History     Social History     Marital status: Life Partner     Spouse name: N/A     Number of children: N/A     Years of education: N/A     Occupational History     Not on file.     Social History Main Topics     Smoking status: Never Smoker     Smokeless tobacco: Never Used     Alcohol use Yes      Comment: 0-1 drink per week     Drug use: No     Sexual activity: Yes     Partners: Female      Comment: monogamous relationship     Other Topics Concern      Service No     Blood Transfusions No     Caffeine Concern No     Occupational Exposure No     Hobby Hazards No     Sleep Concern  "No     Stress Concern No     Weight Concern Yes     Feels she is underweight     Special Diet No     Back Care No     Exercise Yes     Yoga twice weekly     Bike Helmet Yes     Seat Belt Yes     Self-Exams No     Parent/Sibling W/ Cabg, Mi Or Angioplasty Before 65f 55m? No     Social History Narrative        Balanced Diet - Yes    Osteoporosis Prevention Measures - Dairy servings per day: 2    Regular Exercise -  No Describe     Dental Exam - YES - Date: 12/09    Eye Exam - YES - Date: 2008    Self Breast Exam - Yes    Abuse: Current or Past (Physical, Sexual or Emotional)- No    Do you feel safe in your environment - Yes    Guns stored in the home - No    Sunscreen used - Yes    Seatbelts used - Yes    Lipids -  YES - Date: 10/7/08    Glucose -  NO    Colon Cancer Screening - No    Hemoccults - UNKNOWN    Pap Test -  YES - Date: 10/7/08    Do you have any concerns about STD's -  No    Mammography - YES - Date: pt had mammo 10 yrs ago due to breast lumps    DEXA - NO    Immunizations reviewed and up to date - No, ? Last TD    DALE Mendiola CMA                   Family History   Problem Relation Age of Onset     Gynecology Mother      fibroids     Hypertension Father      Lipids Father      Cancer Maternal Grandmother      throat     Breast Cancer Paternal Grandmother      Cancer - colorectal Paternal Grandfather        Current Outpatient Prescriptions   Medication     cyclobenzaprine (FLEXERIL) 5 MG tablet     predniSONE (DELTASONE) 20 MG tablet     Calcium Carbonate-Vit D-Min (CALCIUM 1200 PO)     FERROUS GLUCONATE     fluticasone (FLONASE) 50 MCG/ACT spray     ibuprofen 200 MG capsule     VITAMIN D, CHOLECALCIFEROL, PO     No current facility-administered medications for this visit.            Objective Findings     Vitals:    11/17/18 1115   BP: (!) 143/99   Pulse: 101   Height: 1.638 m (5' 4.5\")         Physical Exam:  Gen: A&O, appears somewhat uncomfortable  CV: extremities well perfused x4  Pulm: without " respiratory distress  Derm: no rashes or lesions  Psych: normal affect and speech  Gait: normal    MSK:   - Back:  No obvious deformity or scoliosis.  FROM without pain. Spine non-tender to palpation over thoracic and lumbar processes.  TTP and muscle spasm on the right paraspinal muscles.  5/5 strength in bilateral lower extremities. 2+ bilateral patellar and achilles reflexes.  Normal lower extremity sensation bilaterally. Positive SLR and slump test on the right.            Assessment / Plan   #) Low back strain    - Counseled patient that findings are likely consistent with low back strain +/- discogenic etiology including possible disc bulge/extrusion.   - Counseled to avoid immobilization as this can cause worsening of her muscle spasm.   - Recommended use of the flexeril as needed at night for muscle spasm  - Recommend a short course of prednisone for help with inflammation.  - Counseled to take the corticosteroid in the morning to avoid insomnia at night.   - Recommend beginning formal physical therapy to prevent recurrence and help with discomfort  - Counseled if weakness, worsening of symptoms, or no improvement to seek further evaluation as needed.   - Consider imaging if no improvement  - Follow-up with me in clinic in 2 weeks    Patient endorsed understanding and agreement with the above plan    Keny Tan MD  Primary Care Sports Medicine, CA[facility]

## 2018-11-17 NOTE — LETTER
11/17/2018       RE: Gerardo Mandujano  [address]  [facility] [address]     Dear Colleague,    Thank you for referring your patient, Gerardo Mandujano, to the [facility] SPORTS AND ORTHOPAEDIC WALK IN CLINIC at [facility]. Please see a copy of my visit note below.          SPORTS & ORTHOPEDIC WALK-IN VISIT 11/17/2018    Primary Care Physician: Dr. Joiner    Had a twinge of pain when she went to bed last night. Middle right side. Only thing she did yesterday differently was walking her dog twice with new harness. Woke up last night with spasm in back in same area, extending further up and down spine on right side. Hard time getting comfortable again. Was able to fall asleep by laying on left side but still wasn't very comfortable. Pain hasn't gotten worse but still there, maybe a tiny bit better than when she woke up in the middle of the night. Pain with deep breathing or certain movements. Feels like she's holding her body pretty rigidly. Extends now up to her shoulder and down to her low back.  Early November she had a similar experience but in her groin area extending around to low back, has happened on and off before, PCP not sure what is causing that. Burning pain. Thinks it may be scar tissue from hysterectomy.     Reason for visit:     What part of your body is injured / painful?  right mid back    What caused the injury /pain? Unsure    How long ago did your injury occur or pain begin? yesterday    What are your most bothersome symptoms? Pain    How would you characterize your symptom?  sharp and throbing    What makes your symptoms better? Ice, heat, Ibuprofen and Tylenol - not helping    What makes your symptoms worse? Movement and Other: breathing    Have you been previously seen for this problem? No    Medical History:    Any recent changes to your medical history? No    Any new medication prescribed since last visit? No    Have you had surgery on this body part  before? No    Review of Systems:    Do you have fever, chills, weight loss? No    Do you have any vision problems? No    Do you have any chest pain or edema? No    Do you have any shortness of breath or wheezing?  Yes, with sharper pain    Do you have stomach problems? No    Do you have any numbness or focal weakness? No    Do you have diabetes? No    Do you have problems with bleeding or clotting? No    Do you have an rashes or other skin lesions? No       HPI    Ms. Mandujano is a pleasant 47 year old year old female who presents to orthopedic walk in clinic today with concerns of low back pain x1 day.  Gerardo explains that she was walking her dog yesterday on a new harness and that the dog had been pulling her.  She then woke up last night with severe low back pain and tightness.  She is continued to have this low back pain and right-sided muscle spasm this morning.  She endorses prior history of low back pain radiating around to her right hip intermittently over the last 1-2 months but states today's symptoms are different.  She denies any radicular symptoms down the right leg.  She intermittently experiences fullness in her low back.  She denies any changes in bowel or bladder function or saddle anesthesia.  Denies any lower extremity weakness.  Ibuprofen has not helped.      Medical History     Past Surgical History:   Procedure Laterality Date     C ANESTH, SECTION       LAPAROSCOPIC HYSTERECTOMY SUPRACERVICAL  2012    benign path       Past Medical History:   Diagnosis Date     Allergies     Seasonal     Tension headache        Allergies   Allergen Reactions     Chloraprep One Step Itching and Rash       Social History     Social History     Marital status: Life Partner     Spouse name: N/A     Number of children: N/A     Years of education: N/A     Occupational History     Not on file.     Social History Main Topics     Smoking status: Never Smoker     Smokeless tobacco: Never Used     Alcohol  use Yes      Comment: 0-1 drink per week     Drug use: No     Sexual activity: Yes     Partners: Female      Comment: monogamous relationship     Other Topics Concern      Service No     Blood Transfusions No     Caffeine Concern No     Occupational Exposure No     Hobby Hazards No     Sleep Concern No     Stress Concern No     Weight Concern Yes     Feels she is underweight     Special Diet No     Back Care No     Exercise Yes     Yoga twice weekly     Bike Helmet Yes     Seat Belt Yes     Self-Exams No     Parent/Sibling W/ Cabg, Mi Or Angioplasty Before 65f 55m? No     Social History Narrative        Balanced Diet - Yes    Osteoporosis Prevention Measures - Dairy servings per day: 2    Regular Exercise -  No Describe     Dental Exam - YES - Date: 12/09    Eye Exam - YES - Date: 2008    Self Breast Exam - Yes    Abuse: Current or Past (Physical, Sexual or Emotional)- No    Do you feel safe in your environment - Yes    Guns stored in the home - No    Sunscreen used - Yes    Seatbelts used - Yes    Lipids -  YES - Date: 10/7/08    Glucose -  NO    Colon Cancer Screening - No    Hemoccults - UNKNOWN    Pap Test -  YES - Date: 10/7/08    Do you have any concerns about STD's -  No    Mammography - YES - Date: pt had mammo 10 yrs ago due to breast lumps    DEXA - NO    Immunizations reviewed and up to date - No, ? Last TD    DALE Mendiola, CMA                   Family History   Problem Relation Age of Onset     Gynecology Mother      fibroids     Hypertension Father      Lipids Father      Cancer Maternal Grandmother      throat     Breast Cancer Paternal Grandmother      Cancer - colorectal Paternal Grandfather        Current Outpatient Prescriptions   Medication     cyclobenzaprine (FLEXERIL) 5 MG tablet     predniSONE (DELTASONE) 20 MG tablet     Calcium Carbonate-Vit D-Min (CALCIUM 1200 PO)     FERROUS GLUCONATE     fluticasone (FLONASE) 50 MCG/ACT spray     ibuprofen 200 MG capsule     VITAMIN D, CHOLECALCIFEROL,  "PO     No current facility-administered medications for this visit.            Objective Findings     Vitals:    11/17/18 1115   BP: (!) 143/99   Pulse: 101   Height: 1.638 m (5' 4.5\")         Physical Exam:  Gen: A&O, appears somewhat uncomfortable  CV: extremities well perfused x4  Pulm: without respiratory distress  Derm: no rashes or lesions  Psych: normal affect and speech  Gait: normal    MSK:   - Back:  No obvious deformity or scoliosis.  FROM without pain. Spine non-tender to palpation over thoracic and lumbar processes.  TTP and muscle spasm on the right paraspinal muscles.  5/5 strength in bilateral lower extremities. 2+ bilateral patellar and achilles reflexes.  Normal lower extremity sensation bilaterally. Positive SLR and slump test on the right.            Assessment / Plan   #) Low back strain    - Counseled patient that findings are likely consistent with low back strain +/- discogenic etiology including possible disc bulge/extrusion.   - Counseled to avoid immobilization as this can cause worsening of her muscle spasm.   - Recommended use of the flexeril as needed at night for muscle spasm  - Recommend a short course of prednisone for help with inflammation.  - Counseled to take the corticosteroid in the morning to avoid insomnia at night.   - Recommend beginning formal physical therapy to prevent recurrence and help with discomfort  - Counseled if weakness, worsening of symptoms, or no improvement to seek further evaluation as needed.   - Consider imaging if no improvement  - Follow-up with me in clinic in 2 weeks    Patient endorsed understanding and agreement with the above plan      Again, thank you for allowing me to participate in the care of your patient.      Sincerely,    Keny Tan MD      "

## 2018-12-03 ENCOUNTER — THERAPY VISIT (OUTPATIENT)
Dept: PHYSICAL THERAPY | Facility: CLINIC | Age: 48
End: 2018-12-03
Payer: COMMERCIAL

## 2018-12-03 DIAGNOSIS — S39.012A STRAIN OF LUMBAR REGION, INITIAL ENCOUNTER: ICD-10-CM

## 2018-12-03 DIAGNOSIS — M54.50 RIGHT-SIDED LOW BACK PAIN WITHOUT SCIATICA, UNSPECIFIED CHRONICITY: Primary | ICD-10-CM

## 2018-12-03 PROCEDURE — 97530 THERAPEUTIC ACTIVITIES: CPT | Mod: GP | Performed by: PHYSICAL THERAPIST

## 2018-12-03 PROCEDURE — 97161 PT EVAL LOW COMPLEX 20 MIN: CPT | Mod: GP | Performed by: PHYSICAL THERAPIST

## 2018-12-03 PROCEDURE — 97112 NEUROMUSCULAR REEDUCATION: CPT | Mod: GP | Performed by: PHYSICAL THERAPIST

## 2018-12-03 NOTE — MR AVS SNAPSHOT
After Visit Summary   12/3/2018    Gerardo Mandujano    MRN: [id number]           Patient Information     Date Of Birth          1970        Visit Information        Provider Department      12/3/2018 3:10 PM Tonie Styles PT [facility]        Today's Diagnoses     Right-sided low back pain without sciatica, unspecified chronicity    -  1    Strain of lumbar region, initial encounter           Follow-ups after your visit        Your next 10 appointments already scheduled     Dec 06, 2018  4:40 PM CST   (Arrive by 4:25 PM)   Return Walk In Ortho with Keny Tan MD   [facility] ([facility] [place])    [address]  [address]  [facility] [address] [phone]            Dec 17, 2018  3:10 PM CST   VENKATESH Extremity with Tonie Styles PT   [facility] ([facility])    [address] [place] [id number] [phone]            Jan 07, 2019  3:10 PM CST   VENKATES[facility] Extremity with Tonie Styles PT   [facility] ([facility])    [address] [place] [id number] [phone]              Who to contact     If you have questions or need follow up information about today's clinic visit or your schedule please contact [facility] [employer] [facility] directly at [phone].  Normal or non-critical lab and imaging results will be communicated to you by MyChart, letter or phone within 4 business days after the clinic has received the results. If you do not hear from us within 7 days, please contact the clinic through MyChart or phone. If you have a critical or abnormal lab result, we will notify you by phone as soon as possible.  Submit refill requests through [employer] or call your pharmacy and they will forward the refill request to us. Please allow 3 business days for your refill to be completed.           Additional Information About Your Visit        MyChart Information     [employer] gives you secure access to your electronic health record. If you see a primary care provider, you can also send messages to your care team and make appointments. If you have questions, please call your primary care clinic.  If you do not have a primary care provider, please call [phone] and they will assist you.        Care EveryWhere ID     This is your Care EveryWhere ID. This could be used by other organizations to access your [facility] medical records  [id number]        Your Vitals Were     Last Period                   11/05/2012            Blood Pressure from Last 3 Encounters:   11/17/18 (!) 143/99   01/03/18 132/90   12/06/17 134/88    Weight from Last 3 Encounters:   01/03/18 54.4 kg (120 lb)   12/06/17 55.4 kg (122 lb 1.6 oz)   03/23/17 55.7 kg (122 lb 11.2 oz)              We Performed the Following     HC PT EVAL, LOW COMPLEXITY     VENKATESH INITIAL EVAL REPORT     NEUROMUSCULAR RE-EDUCATION     PHYSICAL THERAPY REFERRAL (Internal)     THERAPEUTIC ACTIVITIES        Primary Care Provider Office Phone # Fax #    MariamKrystle Joiner [phone]       [facility] [address] [place] [address]        Equal Access to Services     MARK ANTHONY TRUONG AH: Hadii agnieszka schulzo Soheatherali, waaxda luqadaha, qaybta kaalmada adeegyada, rosamaria jay. So [facility] [phone].    ATENCIÓN: Si habla español, tiene a richey disposición servicios gratuitos de asistencia lingüística. Llame al [phone].    We comply with applicable federal civil rights laws and [place] laws. We do not discriminate on the basis of race, color, national origin, age, disability, sex, sexual orientation, or gender identity.            Thank you!     Thank you for choosing [facility]  for your care. Our goal is always to provide you with excellent care. Hearing back from our patients  is one way we can continue to improve our services. Please take a few minutes to complete the written survey that you may receive in the mail after your visit with us. Thank you!             Your Updated Medication List - Protect others around you: Learn how to safely use, store and throw away your medicines at www.disposemymeds.org.          This list is accurate as of 12/3/18  4:29 PM.  Always use your most recent med list.                   Brand Name Dispense Instructions for use Diagnosis    CALCIUM 1200 PO      Take  by mouth.        cyclobenzaprine 5 MG tablet    FLEXERIL    15 tablet    Take 1 tablet (5 mg) by mouth nightly as needed for muscle spasms    Strain of lumbar region, initial encounter       FERROUS GLUCONATE      Reported on 3/22/2017        fluticasone 50 MCG/ACT nasal spray    FLONASE    1 Bottle    Spray 1-2 sprays into both nostrils daily    Dysfunction of left eustachian tube       ibuprofen 200 MG capsule    ADVIL/MOTRIN     Take 200 mg by mouth as needed        VITAMIN D (CHOLECALCIFEROL) PO      Take  by mouth daily.

## 2018-12-03 NOTE — PROGRESS NOTES
[facility] Initial Evaluation  Subjective:  Patient is a 47 year old female presenting with rehab back hpi. The history is provided by the patient.   Gerardo Mandujano is a 47 year old female with a thoracic and lumbar condition.  Condition occurred with:  Insidious onset.  Condition occurred: for unknown reasons.  This is a new condition  11/16/17 - pt reports onset of right sided mid back pain/spasm during the night. Has not had this type of issue before. Does not recall any reason for this onset, other than she had used a different type of leash with her dog in the preceding days. Most of this pain has resolved, but does have some achey midline LBP.    Reports history of hysterectomy in 2012, and since then has had issues of groin pain off and on, which was told is maybe scar tissue. This can be a burning type of pain. Has had appendicitis ruled out. In the past few months that happened, but this time felt in wrap around to her right low back..    Patient reports pain:  Upper lumbar spine and lower thoracic spine (right sided previously. currently mildine LBP).  Radiates to: during one spasm and some right leg pain - but not currently.  Pain is described as other and aching (spasm) and is intermittent and reported as 3/10.  Associated symptoms:  Other (denies N/T). Pain is worse in the P.M. (but not large difference).  Symptoms are exacerbated by other (cooking/washing dishes/leaning forward, walking dog) and relieved by other and NSAID's (prednisone. was given muscle relaxant but did not need to use).  Since onset symptoms are rapidly improving.  Special testing: none.  Previous treatment includes other (prednisone for 10 days).  There was significant improvement following previous treatment.  General health as reported by patient is good.                                              Objective:  System         Lumbar/SI Evaluation  ROM:    AROM Lumbar:   Flexion:          WNL - painfree  Ext:     "                50% - \"Feels good\"   Side Bend:        Left:  80% with right lumabr stretching    Right:  80% with left hip stretching  Rotation:           Left:     Right:   Side Glide:        Left:     Right:         Strength: hip extension: L 4/5, R 4/5. hip ABD: R 4+/5. Poor TrA activation in hooklying, weak and delayed. No multifidus activation in prone with leg raise, demos overuse of lumbar superficail paraspinals and poor glute max activation  Lumbar Myotomes:  Lumbar myotomes: hip flexion 5/5 B, knee ext 5/5 B, knee flexion 5/5 B.                  Neural Tension/Mobility:      Left side:SLR or Slump  negative.     Right side:   Slump or SLR  negative.   Lumbar Palpation:  Palpation (lumbar): hypertonicity right upper lumbar paraspinals.    Tenderness not present at Left:    Erector Spinae; PSIS; ASIS; Hip Flexors or Vertebral  Tenderness present at Right: Erector Spinae and Hip Flexors  Tenderness not present at Right:  PSIS; ASIS or Vertebral    Lumbar Provocation:      Left negative with:  Mobility and PROM hip  Right positive with: Mobility  Right negative with:  PROM hip    SI joint/Sacrum:          Left negative at:    Sacral thrust    Right negative at:  Thigh thrust and Sacral thrust                                                   Marilynn Lumbar Evaluation    Posture:  Sitting: poor  Standing: fair            Test Movements:  FIS: During: no effect  After: no effect    Repeat FIS: During: no effect  After: no effect  Mechanical Response: no effect  EIS: During: no effect  After: no effect    Repeat EIS: During: no effect  After: no effect                                                       ROS    Assessment/Plan:    Patient is a 47 year old female with lumbar complaints.    Patient has the following significant findings with corresponding treatment plan.                Diagnosis 1:  Right and midline low back pain and muscle spasm    Pain -  manual therapy, self management, education and home " program  Decreased ROM/flexibility - manual therapy, therapeutic exercise and home program  Decreased strength - therapeutic exercise, therapeutic activities and home program  Impaired muscle performance - neuro re-education and home program  Decreased function - therapeutic activities and home program  Impaired posture - neuro re-education, therapeutic activities and home program    Therapy Evaluation Codes:   1) History comprised of:   Personal factors that impact the plan of care:      None.    Comorbidity factors that impact the plan of care are:      None.     Medications impacting care: Anti-inflammatory.  2) Examination of Body Systems comprised of:   Body structures and functions that impact the plan of care:      Lumbar spine.   Activity limitations that impact the plan of care are:      Bending, Cooking and walking dog.  3) Clinical presentation characteristics are:   Stable/Uncomplicated.  4) Decision-Making    Low complexity using standardized patient assessment instrument and/or measureable assessment of functional outcome.  Cumulative Therapy Evaluation is: Low complexity.    Previous and current functional limitations:  (See Goal Flow Sheet for this information)    Short term and Long term goals: (See Goal Flow Sheet for this information)     Communication ability:  Patient appears to be able to clearly communicate and understand verbal and written communication and follow directions correctly.  Treatment Explanation - The following has been discussed with the patient:   RX ordered/plan of care  Anticipated outcomes  Possible risks and side effects  This patient would benefit from PT intervention to resume normal activities.   Rehab potential is good.    Frequency:  2 X a month, once daily  Duration:  for 2 months  Discharge Plan:  Achieve all LTG.  Independent in home treatment program.  Reach maximal therapeutic benefit.    Please refer to the daily flowsheet for treatment today, total treatment  time and time spent performing 1:1 timed codes.

## 2018-12-17 ENCOUNTER — THERAPY VISIT (OUTPATIENT)
Dept: PHYSICAL THERAPY | Facility: CLINIC | Age: 48
End: 2018-12-17
Payer: COMMERCIAL

## 2018-12-17 DIAGNOSIS — M54.50 RIGHT-SIDED LOW BACK PAIN WITHOUT SCIATICA, UNSPECIFIED CHRONICITY: ICD-10-CM

## 2018-12-17 PROCEDURE — 97112 NEUROMUSCULAR REEDUCATION: CPT | Mod: GP | Performed by: PHYSICAL THERAPIST

## 2018-12-17 PROCEDURE — 97110 THERAPEUTIC EXERCISES: CPT | Mod: GP | Performed by: PHYSICAL THERAPIST

## 2019-01-14 ENCOUNTER — THERAPY VISIT (OUTPATIENT)
Dept: PHYSICAL THERAPY | Facility: CLINIC | Age: 49
End: 2019-01-14
Payer: COMMERCIAL

## 2019-01-14 DIAGNOSIS — M54.50 RIGHT-SIDED LOW BACK PAIN WITHOUT SCIATICA, UNSPECIFIED CHRONICITY: ICD-10-CM

## 2019-01-14 PROCEDURE — 97112 NEUROMUSCULAR REEDUCATION: CPT | Mod: GP | Performed by: PHYSICAL THERAPIST

## 2019-01-14 PROCEDURE — 97530 THERAPEUTIC ACTIVITIES: CPT | Mod: GP | Performed by: PHYSICAL THERAPIST

## 2019-01-14 PROCEDURE — 97110 THERAPEUTIC EXERCISES: CPT | Mod: GP | Performed by: PHYSICAL THERAPIST

## 2019-02-04 ENCOUNTER — MYC MEDICAL ADVICE (OUTPATIENT)
Dept: FAMILY MEDICINE | Facility: CLINIC | Age: 49
End: 2019-02-04

## 2019-02-04 DIAGNOSIS — M54.2 NECK PAIN: Primary | ICD-10-CM

## 2019-02-04 DIAGNOSIS — R68.84 JAW PAIN: ICD-10-CM

## 2019-02-04 DIAGNOSIS — M62.838 TRAPEZIUS MUSCLE SPASM: ICD-10-CM

## 2019-02-04 NOTE — TELEPHONE ENCOUNTER
Rupa,  Please see patient's MyChart message    PT referral cued    Please review/sign or advise    Thank You!  Peyton Melendez, RN  Triage Nurse

## 2019-02-11 ENCOUNTER — THERAPY VISIT (OUTPATIENT)
Dept: PHYSICAL THERAPY | Facility: CLINIC | Age: 49
End: 2019-02-11
Payer: COMMERCIAL

## 2019-02-11 DIAGNOSIS — M54.50 RIGHT-SIDED LOW BACK PAIN WITHOUT SCIATICA, UNSPECIFIED CHRONICITY: ICD-10-CM

## 2019-02-11 PROCEDURE — 97530 THERAPEUTIC ACTIVITIES: CPT | Mod: GP | Performed by: PHYSICAL THERAPIST

## 2019-02-11 PROCEDURE — 97112 NEUROMUSCULAR REEDUCATION: CPT | Mod: GP | Performed by: PHYSICAL THERAPIST

## 2019-02-11 PROCEDURE — 97110 THERAPEUTIC EXERCISES: CPT | Mod: GP | Performed by: PHYSICAL THERAPIST

## 2019-02-11 NOTE — PROGRESS NOTES
DISCHARGE REPORT    Progress reporting period is from 12/3/18 to 2/11/19.       SUBJECTIVE  Subjective: pt reports she has not had any recurrence of any low back issues. was limited d/t some neck pain/HA for a few weeks but has been better lately. for her back, she does not notice any day to day issues. more just notices fatigue with her HEP. has been able to shovel and skate and this has not bothered her. did have some muscle soreness after last appt and Zumbo.    Current Pain level: 0/10.     Initial Pain level: 3/10.   Changes in function:  Yes (See Goal flowsheet attached for changes in current functional level)  Adverse reaction to treatment or activity: None    OBJECTIVE  Changes noted in objective findings:  Yes  Objective: improved endurance of modified front and side plank bilat. minimal loss of pelvic control with bridging marching with arms across chest. painfree all HEP. pt demos good understanding of HEP to continue to work on own     ASSESSMENT/PLAN  Updated problem list and treatment plan: Diagnosis 1:  Right and midline low back pain and muscle spasm    Decreased strength - home program  STG/LTGs have been met or progress has been made towards goals:  Yes (See Goal flow sheet completed today.)  Assessment of Progress: The patient has met all of their long term goals.  Self Management Plans:  Patient has been instructed in a home treatment program.  Patient is independent in a home treatment program.  Patient  has been instructed in self management of symptoms.  Patient is independent in self management of symptoms.  I have re-evaluated this patient and find that the nature, scope, duration and intensity of the therapy is appropriate for the medical condition of the patient.  Gerardo continues to require the following intervention to meet STG and LTG's:  PT intervention is no longer required to meet STG/LTG.    Recommendations:  This patient is ready to be discharged from therapy and continue their  home treatment program.    Please refer to the daily flowsheet for treatment today, total treatment time and time spent performing 1:1 timed codes.

## 2019-03-06 ENCOUNTER — OFFICE VISIT (OUTPATIENT)
Dept: FAMILY MEDICINE | Facility: CLINIC | Age: 49
End: 2019-03-06
Payer: COMMERCIAL

## 2019-03-06 VITALS
HEART RATE: 76 BPM | BODY MASS INDEX: 21.72 KG/M2 | WEIGHT: 127.2 LBS | SYSTOLIC BLOOD PRESSURE: 142 MMHG | TEMPERATURE: 98.4 F | OXYGEN SATURATION: 98 % | HEIGHT: 64 IN | DIASTOLIC BLOOD PRESSURE: 92 MMHG

## 2019-03-06 DIAGNOSIS — G44.209 TENSION HEADACHE: ICD-10-CM

## 2019-03-06 DIAGNOSIS — Z00.01 ENCOUNTER FOR ROUTINE ADULT MEDICAL EXAM WITH ABNORMAL FINDINGS: Primary | ICD-10-CM

## 2019-03-06 DIAGNOSIS — R03.0 ELEVATED BLOOD PRESSURE READING WITHOUT DIAGNOSIS OF HYPERTENSION: ICD-10-CM

## 2019-03-06 PROCEDURE — 99213 OFFICE O/P EST LOW 20 MIN: CPT | Mod: 25 | Performed by: NURSE PRACTITIONER

## 2019-03-06 PROCEDURE — 80053 COMPREHEN METABOLIC PANEL: CPT | Performed by: NURSE PRACTITIONER

## 2019-03-06 PROCEDURE — 84443 ASSAY THYROID STIM HORMONE: CPT | Performed by: NURSE PRACTITIONER

## 2019-03-06 PROCEDURE — 36415 COLL VENOUS BLD VENIPUNCTURE: CPT | Performed by: NURSE PRACTITIONER

## 2019-03-06 PROCEDURE — 82043 UR ALBUMIN QUANTITATIVE: CPT | Performed by: NURSE PRACTITIONER

## 2019-03-06 PROCEDURE — 99396 PREV VISIT EST AGE 40-64: CPT | Performed by: NURSE PRACTITIONER

## 2019-03-06 RX ORDER — CYCLOBENZAPRINE HCL 5 MG
5 TABLET ORAL 3 TIMES DAILY PRN
COMMUNITY
End: 2021-01-15

## 2019-03-06 ASSESSMENT — ENCOUNTER SYMPTOMS
HEADACHES: 1
WEAKNESS: 0
PARESTHESIAS: 0
JOINT SWELLING: 0
BREAST MASS: 0
CONSTIPATION: 0
DIARRHEA: 0
HEMATOCHEZIA: 0
PALPITATIONS: 0
HEMATURIA: 0
DYSURIA: 0
EYE PAIN: 0
DIZZINESS: 0
COUGH: 0
SHORTNESS OF BREATH: 0
ARTHRALGIAS: 0
HEARTBURN: 0
SORE THROAT: 0
FEVER: 0
FREQUENCY: 0
CHILLS: 0
MYALGIAS: 0
ABDOMINAL PAIN: 0
NERVOUS/ANXIOUS: 0
NAUSEA: 0

## 2019-03-06 ASSESSMENT — MIFFLIN-ST. JEOR: SCORE: 1194.73

## 2019-03-06 NOTE — PATIENT INSTRUCTIONS
Monitor blood pressure a couple times a week  Sitting for 5 minutes, not talking, not right after coffee or exercising, feet flat on the floor, legs uncrossed, arm at table height  Send me J[facility] Communication message in 2-3 weeks with the results      Preventive Health Recommendations  Female Ages 40 to 49    Yearly exam:     See your health care provider every year in order to  1. Review health changes.   2. Discuss preventive care.    3. Review your medicines if your doctor prescribed any.      Get a Pap test every three years (unless you have an abnormal result and your provider advises testing more often).      If you get Pap tests with HPV test, you only need to test every 5 years, unless you have an abnormal result. You do not need a Pap test if your uterus was removed (hysterectomy) and you have not had cancer.      You should be tested each year for STDs (sexually transmitted diseases), if you're at risk.     Ask your doctor if you should have a mammogram.      Have a colonoscopy (test for colon cancer) if someone in your family has had colon cancer or polyps before age 50.       Have a cholesterol test every 5 years.       Have a diabetes test (fasting glucose) after age 45. If you are at risk for diabetes, you should have this test every 3 years.    Shots: Get a flu shot each year. Get a tetanus shot every 10 years.     Nutrition:     Eat at least 5 servings of fruits and vegetables each day.    Eat whole-grain bread, whole-wheat pasta and brown rice instead of white grains and rice.    Get adequate Calcium and Vitamin D.      Lifestyle    Exercise at least 150 minutes a week (an average of 30 minutes a day, 5 days a week). This will help you control your weight and prevent disease.    Limit alcohol to one drink per day.    No smoking.     Wear sunscreen to prevent skin cancer.    See your dentist every six months for an exam and cleaning.

## 2019-03-06 NOTE — PROGRESS NOTES
SUBJECTIVE:   CC: Gerardo Mandujano is an 48 year old woman who presents for preventive health visit.     Answers for HPI/ROS submitted by the patient on 3/6/2019   Annual Exam:  Frequency of exercise:: 6-7 days/week  Getting at least 3 servings of Calcium per day:: Yes  Diet:: Other  Taking medications regularly:: Yes  Bi-annual eye exam:: Yes  Dental care twice a year:: Yes  Sleep apnea or symptoms of sleep apnea:: None  abdominal pain: No  Blood in stool: No  Blood in urine: No  chest pain: No  chills: No  congestion: No  constipation: No  cough: No  diarrhea: No  dizziness: No  ear pain: No  eye pain: No  nervous/anxious: No  fever: No  frequency: No  genital sores: No  headaches: Yes  hearing loss: No  heartburn: No  arthralgias: No  joint swelling: No  peripheral edema: No  mood changes: No  myalgias: No  nausea: No  dysuria: No  palpitations: No  Skin sensation changes: No  sore throat: No  urgency: No  rash: No  shortness of breath: No  visual disturbance: No  weakness: No  pelvic pain: No  vaginal bleeding: No  vaginal discharge: No  tenderness: No  breast mass: No  breast discharge: No  Additional concerns today:: No  Duration of exercise:: 30-45 minutes      Today's PHQ-2 Score:   PHQ-2 ( 1999 [employer]) 3/6/2019 3/6/2019   Q1: Little interest or pleasure in doing things 0 0   Q2: Feeling down, depressed or hopeless 0 1   PHQ-2 Score 0 1   Q1: Little interest or pleasure in doing things Not at all -   Q2: Feeling down, depressed or hopeless Several days -   PHQ-2 Score 1 -       Abuse: Current or Past(Physical, Sexual or Emotional)- No  Do you feel safe in your environment? Yes    Social History     Tobacco Use     Smoking status: Never Smoker     Smokeless tobacco: Never Used   Substance Use Topics     Alcohol use: Yes     Comment: 0-1 drink per week     If you drink alcohol do you typically have >3 drinks per day or >7 drinks per week? No                     Reviewed orders with patient.  Reviewed health  "maintenance and updated orders accordingly - Yes  Labs reviewed in [facility]    Mammogram Screening: Patient under age 50, mutual decision reflected in health maintenance.      Pertinent mammograms are reviewed under the imaging tab.  History of abnormal Pap smear: NO - age 30- 65 PAP every 3 years recommended  PAP / HPV Latest Ref Rng & Units 10/2/2015 3/4/2013 1/19/2010   PAP - NIL NIL NIL   HPV 16 DNA NEG Negative - -   HPV 18 DNA NEG Negative - -   OTHER HR HPV NEG Negative - -     Reviewed and updated as needed this visit by clinical staff  Tobacco  Allergies  Meds  Med Hx  Surg Hx  Fam Hx  Soc Hx        Reviewed and updated as needed this visit by Provider        Phased out job in the fall and is home with son, Nehemiah, everyday.  He is doing online school - situation is working for the family, but continues to have challenges.  Does get a break a couple days of the week, but most of it has been contract work.  This will phase out and plans to have gym time or friend time.  Stress has overall been better.    Getting headaches, muscle tension - lessened and ibuprofen helps.  No headaches this past week, but average once a week or every other week.  Not getting as much exercise as she wants  Fall last year had low back spasm - saw physical therapy and doing exercises.  If \"pushing it\" it will flare up - example is carson class    ROS:  CONSTITUTIONAL: NEGATIVE for fever, chills, change in weight  INTEGUMENTARU/SKIN: NEGATIVE for worrisome rashes, moles or lesions  EYES: NEGATIVE for vision changes or irritation  ENT: NEGATIVE for ear, mouth and throat problems  RESP: NEGATIVE for significant cough or SOB  BREAST: NEGATIVE for masses, tenderness or discharge  CV: NEGATIVE for chest pain, palpitations or peripheral edema  GI: NEGATIVE for nausea, abdominal pain, heartburn, or change in bowel habits  : NEGATIVE for unusual urinary or vaginal symptoms. Periods are regular.  MUSCULOSKELETAL: NEGATIVE for significant " "arthralgias or myalgia  NEURO: NEGATIVE for weakness, dizziness or paresthesias  PSYCHIATRIC: NEGATIVE for changes in mood or affect    OBJECTIVE:   BP (!) 142/92   Pulse 76   Temp 98.4  F (36.9  C) (Oral)   Ht 1.63 m (5' 4.17\")   Wt 57.7 kg (127 lb 3.2 oz)   LMP 11/05/2012   SpO2 98%   BMI 21.72 kg/m    EXAM:  GENERAL: healthy, alert and no distress  EYES: Eyes grossly normal to inspection, PERRL and conjunctivae and sclerae normal  HENT: ear canals and TM's normal, nose and mouth without ulcers or lesions  NECK: no adenopathy, no asymmetry, masses, or scars and thyroid normal to palpation  RESP: lungs clear to auscultation - no rales, rhonchi or wheezes  BREAST: normal without masses, tenderness or nipple discharge and no palpable axillary masses or adenopathy  CV: regular rate and rhythm, normal S1 S2, no S3 or S4, no murmur, click or rub, no peripheral edema and peripheral pulses strong  ABDOMEN: soft, nontender, no hepatosplenomegaly, no masses and bowel sounds normal  MS: no gross musculoskeletal defects noted, no edema  SKIN: no suspicious lesions or rashes  NEURO: Normal strength and tone, mentation intact and speech normal  PSYCH: mentation appears normal, affect normal/bright    Diagnostic Test Results:  pending    ASSESSMENT/PLAN:   (Z00.01) Encounter for routine adult medical exam with abnormal findings  (primary encounter diagnosis)  Comment:   Plan:     (R03.0) Elevated blood pressure reading without diagnosis of hypertension  Comment:   Plan: order for DME, Comprehensive metabolic panel,         Albumin Random Urine Quantitative with Creat         Ratio, TSH with free T4 reflex   check at home for 2-3 weeks and report via [employer].  Not many lifestyle modifications that could be made.  Consider caffeine reduction.  Labs today.  Does use a lot of ibuprofen        (G44.209) Tension headache  Comment:   Plan: Continue physical therapy         COUNSELING:   Reviewed preventive health counseling, as " "reflected in patient instructions    BP Readings from Last 1 Encounters:   03/06/19 (!) 142/92     Estimated body mass index is 21.72 kg/m  as calculated from the following:    Height as of this encounter: 1.63 m (5' 4.17\").    Weight as of this encounter: 57.7 kg (127 lb 3.2 oz).    BP Screening:   Last 3 BP Readings:    BP Readings from Last 3 Encounters:   03/06/19 (!) 142/92   11/17/18 (!) 143/99   01/03/18 132/90       The following was recommended to the patient:  Laboratory tests and home monitoring       reports that  has never smoked. she has never used smokeless tobacco.      Counseling Resources:  ATP IV Guidelines  Pooled Cohorts Equation Calculator  Breast Cancer Risk Calculator  FRAX Risk Assessment  ICSI Preventive Guidelines  Dietary Guidelines for Americans, 2010  USDA's MyPlate  ASA Prophylaxis  Lung CA Screening    MANUEL Lopez [facility]  "

## 2019-03-07 LAB
ALBUMIN SERPL-MCNC: 4.2 G/DL (ref 3.4–5)
ALP SERPL-CCNC: 93 U/L (ref 40–150)
ALT SERPL W P-5'-P-CCNC: 21 U/L (ref 0–50)
ANION GAP SERPL CALCULATED.3IONS-SCNC: 8 MMOL/L (ref 3–14)
AST SERPL W P-5'-P-CCNC: 18 U/L (ref 0–45)
BILIRUB SERPL-MCNC: 0.4 MG/DL (ref 0.2–1.3)
BUN SERPL-MCNC: 10 MG/DL (ref 7–30)
CALCIUM SERPL-MCNC: 9.3 MG/DL (ref 8.5–10.1)
CHLORIDE SERPL-SCNC: 105 MMOL/L (ref 94–109)
CO2 SERPL-SCNC: 26 MMOL/L (ref 20–32)
CREAT SERPL-MCNC: 0.56 MG/DL (ref 0.52–1.04)
GFR SERPL CREATININE-BSD FRML MDRD: >90 ML/MIN/{1.73_M2}
GLUCOSE SERPL-MCNC: 82 MG/DL (ref 70–99)
POTASSIUM SERPL-SCNC: 3.9 MMOL/L (ref 3.4–5.3)
PROT SERPL-MCNC: 7.2 G/DL (ref 6.8–8.8)
SODIUM SERPL-SCNC: 139 MMOL/L (ref 133–144)
TSH SERPL DL<=0.005 MIU/L-ACNC: 1.62 MU/L (ref 0.4–4)

## 2019-03-08 LAB
CREAT UR-MCNC: 46 MG/DL
MICROALBUMIN UR-MCNC: 22 MG/L
MICROALBUMIN/CREAT UR: 47.61 MG/G CR (ref 0–25)

## 2019-03-08 NOTE — RESULT ENCOUNTER NOTE
Gerardo,    The thyroid, electrolytes, kidney and liver function were all normal.  Have you been able to get a blood pressure cuff?  If you have any questions, please feel free to contact the clinic.    MELANIE Linton

## 2019-03-13 ENCOUNTER — MYC MEDICAL ADVICE (OUTPATIENT)
Dept: FAMILY MEDICINE | Facility: CLINIC | Age: 49
End: 2019-03-13

## 2019-03-13 DIAGNOSIS — Z83.719 FAMILY HISTORY OF COLONIC POLYPS: Primary | ICD-10-CM

## 2019-03-13 DIAGNOSIS — R80.9 MICROALBUMINURIA: ICD-10-CM

## 2019-03-13 DIAGNOSIS — R03.0 ELEVATED BLOOD PRESSURE READING WITHOUT DIAGNOSIS OF HYPERTENSION: ICD-10-CM

## 2019-03-13 DIAGNOSIS — I10 ESSENTIAL HYPERTENSION: ICD-10-CM

## 2019-03-13 NOTE — TELEPHONE ENCOUNTER
Rupa    See pt mychart message    Referral cued if you agree    Alda Sariah RN   [facility]

## 2019-03-14 RX ORDER — LISINOPRIL 10 MG/1
10 TABLET ORAL DAILY
Qty: 90 TABLET | Refills: 0 | Status: SHIPPED | OUTPATIENT
Start: 2019-03-14 | End: 2019-06-03

## 2019-03-14 NOTE — RESULT ENCOUNTER NOTE
Gerardo,    This is the microalbumin.  This is a mild elevation.  If you have any questions, please feel free to contact the clinic.    MELANIE Linton

## 2019-03-14 NOTE — TELEPHONE ENCOUNTER
Rupa,  Please see patient's MyChart replay    Please advise    Thank You!  Peyton Melendez, LALY  Triage Nurse

## 2019-04-09 NOTE — PROGRESS NOTES
"  SUBJECTIVE:   Gerardo Mandujano is a 48 year old female who presents to clinic today for the following   health issues:    Hypertension Follow-up      Outpatient blood pressures are being checked at home.  Results are usually normal since started the medication. 110-127/70's - some that are higher if in pain    Low Salt Diet: not monitoring salt    Amount of exercise or physical activity: 6-7 days/week for an average of 30-45 minutes; walking dog 30-60 minutes each day. Has also been doing carson 1x week    Problems taking medications regularly: No    Medication side effects: none    Diet: regular (no restrictions)    Component      Latest Ref Rng & Units 3/22/2017 3/6/2019   Sodium      133 - 144 mmol/L  139   Potassium      3.4 - 5.3 mmol/L  3.9   Chloride      94 - 109 mmol/L  105   Carbon Dioxide      20 - 32 mmol/L  26   Anion Gap      3 - 14 mmol/L  8   Glucose      70 - 99 mg/dL  82   Urea Nitrogen      7 - 30 mg/dL  10   Creatinine      0.52 - 1.04 mg/dL  0.56   GFR Estimate      >60 mL/min/1.73:m2  >90   GFR Estimate If Black      >60 mL/min/1.73:m2  >90   Calcium      8.5 - 10.1 mg/dL  9.3   Bilirubin Total      0.2 - 1.3 mg/dL  0.4   Albumin      3.4 - 5.0 g/dL  4.2   Protein Total      6.8 - 8.8 g/dL  7.2   Alkaline Phosphatase      40 - 150 U/L  93   ALT      0 - 50 U/L  21   AST      0 - 45 U/L  18   Creatinine Urine      mg/dL  46   Albumin Urine mg/L      mg/L  22   Albumin Urine mg/g Cr      0 - 25 mg/g Cr  47.61 (H)   TSH      0.40 - 4.00 mU/L  1.62     Questions/Concerns: check in on her back and headache if there is time.       \"Bad week\" last week with groin week. Was knocked out for 1 full day. Then radiated to back hip. Then 50%. By Friday doing normal activities. 8/10 pain, First time ever that bad. First time it was this bad since November.     Friday started having headache. Intense in front of head. Sensitivity to light. Laid down all day. Took excedrin migraine. Came back again that evening. " "Fatigued on Sunday.   Tooks 1/2 bottle of advil last week for pain/headach. Works well for back.       Normally takes 600mg every morning. Sometimes takes 400-600 more in evening. During lfare upes takes 800mg q4-6h + tylenool. Concerned about taking max level of meds.       R Groin pain: Oncet: 2+ years  Aggravates: overworking (carson + PT exercise), if dog (100lbs) jerks on walks, walking on ice, walking more than 20 minutes    PT exercises: 3x week for back. States this has helped.       Headaches: decreased in frequency since starting BP meds. Only 1 since starting meds.  Headaches: Has TMJ start in back and wrap to front. Nervous to take medications d/t fatigue the next day. Can't do anything \"visual.\"   Stress may be a trigger. And maybe back?     Additional history: as documented    Reviewed  and updated as needed this visit by clinical staff         Reviewed and updated as needed this visit by Provider         ROS:  Constitutional, HEENT, cardiovascular, pulmonary, gi and gu systems are negative, except as otherwise noted.    OBJECTIVE:     /80   Pulse 99   Temp 98.3  F (36.8  C) (Oral)   Ht 1.626 m (5' 4\")   Wt 57.4 kg (126 lb 9.6 oz)   LMP 11/05/2012   SpO2 98%   BMI 21.73 kg/m    Body mass index is 21.73 kg/m .  GENERAL: healthy, alert and no distress  NECK: no adenopathy, no asymmetry, masses, or scars and thyroid normal to palpation  RESP: lungs clear to auscultation - no rales, rhonchi or wheezes  CV: regular rate and rhythm, normal S1 S2, no S3 or S4, no murmur, click or rub, no peripheral edema and peripheral pulses strong  MS: no gross musculoskeletal defects noted, no edema; FROm right hip with pain on external rotation    Diagnostic Test Results:  Results for orders placed or performed in visit on 04/10/19   Albumin Random Urine Quantitative with Creat Ratio   Result Value Ref Range    Creatinine Urine 67 mg/dL    Albumin Urine mg/L 10 mg/L    Albumin Urine mg/g Cr 14.26 0 - 25 mg/g " Cr         ASSESSMENT/PLAN:     Hypertension; controlled   Associated with the following complications:    None   Plan:  Labs:   Microalbumin      ICD-10-CM    1. Benign essential hypertension I10 Albumin Random Urine Quantitative with Creat Ratio   2. Microalbuminuria R80.9 Albumin Random Urine Quantitative with Creat Ratio   3. Tension headache G44.209 gabapentin (NEURONTIN) 100 MG capsule   4. Right groin pain R10.31 gabapentin (NEURONTIN) 100 MG capsule     [facility]   Start gabapentin in effort to reduce/eliminate ibuprofen use.    Patient Instructions   Magnesium glycinate 400-600 mg nightly  Gabapentin 100 mg start with just one tablet nightly - can increase this to one in the morning and at night and then ultimately three times a day.    Goal is to reduce the ibuprofen use to episodic rather than daily    Try swimming, but can also return to physical therapy for 1-2 time update    Also have option of PM&R/physiatry        MANUEL Lopez [facility]

## 2019-04-10 ENCOUNTER — OFFICE VISIT (OUTPATIENT)
Dept: FAMILY MEDICINE | Facility: CLINIC | Age: 49
End: 2019-04-10
Payer: COMMERCIAL

## 2019-04-10 VITALS
HEART RATE: 99 BPM | OXYGEN SATURATION: 98 % | HEIGHT: 64 IN | BODY MASS INDEX: 21.61 KG/M2 | SYSTOLIC BLOOD PRESSURE: 138 MMHG | TEMPERATURE: 98.3 F | DIASTOLIC BLOOD PRESSURE: 80 MMHG | WEIGHT: 126.6 LBS

## 2019-04-10 DIAGNOSIS — R10.31 RIGHT GROIN PAIN: ICD-10-CM

## 2019-04-10 DIAGNOSIS — G44.209 TENSION HEADACHE: ICD-10-CM

## 2019-04-10 DIAGNOSIS — I10 BENIGN ESSENTIAL HYPERTENSION: Primary | ICD-10-CM

## 2019-04-10 DIAGNOSIS — R80.9 MICROALBUMINURIA: ICD-10-CM

## 2019-04-10 PROCEDURE — 82043 UR ALBUMIN QUANTITATIVE: CPT | Performed by: NURSE PRACTITIONER

## 2019-04-10 PROCEDURE — 99214 OFFICE O/P EST MOD 30 MIN: CPT | Performed by: NURSE PRACTITIONER

## 2019-04-10 RX ORDER — GABAPENTIN 100 MG/1
100 CAPSULE ORAL 3 TIMES DAILY
Qty: 90 CAPSULE | Refills: 3 | Status: SHIPPED | OUTPATIENT
Start: 2019-04-10 | End: 2020-02-06

## 2019-04-10 ASSESSMENT — MIFFLIN-ST. JEOR: SCORE: 1189.25

## 2019-04-10 NOTE — PATIENT INSTRUCTIONS
Magnesium glycinate 400-600 mg nightly  Gabapentin 100 mg start with just one tablet nightly - can increase this to one in the morning and at night and then ultimately three times a day.    Goal is to reduce the ibuprofen use to episodic rather than daily    Try swimming, but can also return to physical therapy for 1-2 time update    Also have option of PM&R/physiatry

## 2019-04-11 LAB
CREAT UR-MCNC: 67 MG/DL
MICROALBUMIN UR-MCNC: 10 MG/L
MICROALBUMIN/CREAT UR: 14.26 MG/G CR (ref 0–25)

## 2019-04-11 NOTE — RESULT ENCOUNTER NOTE
Gerardo,    Microalbumin test returned to normal!  Lisinopril or time we will never know, but I am glad you are on the lisinopril nevertheless. If you have any questions, please feel free to contact the clinic.    MELANIE Linton

## 2019-04-24 ENCOUNTER — MYC MEDICAL ADVICE (OUTPATIENT)
Dept: FAMILY MEDICINE | Facility: CLINIC | Age: 49
End: 2019-04-24

## 2019-04-24 NOTE — TELEPHONE ENCOUNTER
Rupa,  Please see patient's Muxlimt message    Follow up from 04/10/2019 office visit.     Please advise    Thank You!  Peyton Melendez RN  Triage Nurse

## 2019-05-06 ENCOUNTER — THERAPY VISIT (OUTPATIENT)
Dept: PHYSICAL THERAPY | Facility: CLINIC | Age: 49
End: 2019-05-06
Attending: NURSE PRACTITIONER
Payer: COMMERCIAL

## 2019-05-06 DIAGNOSIS — G89.29 CHRONIC RIGHT-SIDED LOW BACK PAIN WITHOUT SCIATICA: ICD-10-CM

## 2019-05-06 DIAGNOSIS — M54.50 CHRONIC RIGHT-SIDED LOW BACK PAIN WITHOUT SCIATICA: ICD-10-CM

## 2019-05-06 DIAGNOSIS — R10.31 RIGHT GROIN PAIN: ICD-10-CM

## 2019-05-06 PROCEDURE — 97530 THERAPEUTIC ACTIVITIES: CPT | Mod: GP | Performed by: PHYSICAL THERAPIST

## 2019-05-06 PROCEDURE — 97161 PT EVAL LOW COMPLEX 20 MIN: CPT | Mod: GP | Performed by: PHYSICAL THERAPIST

## 2019-05-06 PROCEDURE — 97110 THERAPEUTIC EXERCISES: CPT | Mod: GP | Performed by: PHYSICAL THERAPIST

## 2019-05-06 NOTE — PROGRESS NOTES
[facility] Initial Evaluation  Subjective:  The history is provided by the patient.   Gerardo Mandujano is a 48 year old female with a right hip (and low back) condition.  Occurance: excessive walking caused flare up.  Condition occurred: in the community.  This is a chronic and recurrent condition  April 2019 - pt had flare up of right groin and low back pain.  Does have previous 2+ year history of recurrent groin pain. This flare up happened after a very long walk she had.  Pt was seen by this provider for low back issues during winter of 2019 which did improve. But then had a few tough months. Has continued with a few of her strengthening/core HEP.  Has been trying some different medications through MD to manage pain.   Was trying to be back to [place] and had a few bad days after that. Has switched to swimming lately which has gone well. Doing this a few times a week.  Is currently having a mini flare up about 1x/week..    Site of Pain: right upper/mid lumbar spine.  Radiates to: right groin.  Pain is described as burning and aching and is intermittent Pain Scale: 5-6/10 at worst the last 2 weeks.  Associated symptoms:  Other (tightness). Pain is worse in the P.M..  Symptoms are exacerbated by other, bending/squatting and sitting (lifting. [place]. bad posture) and relieved by analgesics and other (good posture, core strength. laying down. heat).  Since onset symptoms are gradually improving.  Special testing: nothing recent.  Previous treatment includes chiropractic and physical therapy (chiro recently, PT Jan/Feb 2019).  Improvement with previous treatment: good with PT. great short term relief with chiro.  General health reported: good to excellent.                                              Objective:        Flexibility/Screens:   Positive screens:  Lumbar                   Lumbar/SI Evaluation  ROM:    AROM Lumbar:   Flexion:          Fingertips to floor - NE  Ext:                    Mod  limit - NE   Side Bend:        Left:  Min limit    Right:  WNL  Rotation:           Left:     Right:   Side Glide:        Left:     Right:         Strength: not formally assessed today, previously assessed Winter 2019 with general hip/core weakness                                                                   Marilynn Lumbar Evaluation    Posture:  Sitting: fair              Test Movements:  FIS: During: no effect  After: no effect    Repeat FIS: During: no effect  After: no effect  Mechanical Response: IncROM  EIS: During: no effect  After: no effect    Repeat EIS: During: no effect  After: no effect  Mechanical Response: IncROM    EIL: During: no effect  After: no effect    Repeat EIL: During: no effect  After: better  Mechanical Response: IncROM        Conclusion: derangement  Principle of Treatment:  Posture Correction: posture and body mech    Extension: REIL With DYLAN as alternative                                           ROS    Assessment/Plan:    Patient is a 48 year old female with lumbar complaints.    Patient has the following significant findings with corresponding treatment plan.                Diagnosis 1:  Right lumbar derangement    Pain -  manual therapy, self management, education, directional preference exercise and home program  Decreased ROM/flexibility - manual therapy, therapeutic exercise and home program  Decreased strength - therapeutic exercise, therapeutic activities and home program  Decreased function - therapeutic activities and home program  Impaired posture - neuro re-education, therapeutic activities and home program    Therapy Evaluation Codes:   1) History comprised of:   Personal factors that impact the plan of care:      None.    Comorbidity factors that impact the plan of care are:      severe headaches.     Medications impacting care: Pain and gabapentin.  2) Examination of Body Systems comprised of:   Body structures and functions that impact the plan of care:       Lumbar spine.   Activity limitations that impact the plan of care are:      Bending, Driving, Lifting and Sitting.  3) Clinical presentation characteristics are:   Stable/Uncomplicated.  4) Decision-Making    Low complexity using standardized patient assessment instrument and/or measureable assessment of functional outcome.  Cumulative Therapy Evaluation is: Low complexity.    Previous and current functional limitations:  (See Goal Flow Sheet for this information)    Short term and Long term goals: (See Goal Flow Sheet for this information)     Communication ability:  Patient appears to be able to clearly communicate and understand verbal and written communication and follow directions correctly.  Treatment Explanation - The following has been discussed with the patient:   RX ordered/plan of care  Anticipated outcomes  Possible risks and side effects  This patient would benefit from PT intervention to resume normal activities.   Rehab potential is good.    Frequency:  1 X week, once daily  Duration:  for 2 weeks tapering to 2 X a month over 6 weeks  Discharge Plan:  Achieve all LTG.  Independent in home treatment program.  Reach maximal therapeutic benefit.    Please refer to the daily flowsheet for treatment today, total treatment time and time spent performing 1:1 timed codes.

## 2019-05-08 ENCOUNTER — MYC MEDICAL ADVICE (OUTPATIENT)
Dept: FAMILY MEDICINE | Facility: CLINIC | Age: 49
End: 2019-05-08

## 2019-05-09 NOTE — TELEPHONE ENCOUNTER
Rupa,  Please see patient's MyChart message    Please advise    Thank You!  Peyton Melendez, LALY  Triage Nurse

## 2019-05-20 ENCOUNTER — THERAPY VISIT (OUTPATIENT)
Dept: PHYSICAL THERAPY | Facility: CLINIC | Age: 49
End: 2019-05-20
Payer: COMMERCIAL

## 2019-05-20 DIAGNOSIS — G89.29 CHRONIC RIGHT-SIDED LOW BACK PAIN WITHOUT SCIATICA: ICD-10-CM

## 2019-05-20 DIAGNOSIS — M54.50 CHRONIC RIGHT-SIDED LOW BACK PAIN WITHOUT SCIATICA: ICD-10-CM

## 2019-05-20 PROCEDURE — 97140 MANUAL THERAPY 1/> REGIONS: CPT | Mod: GP | Performed by: PHYSICAL THERAPIST

## 2019-05-20 PROCEDURE — 97110 THERAPEUTIC EXERCISES: CPT | Mod: GP | Performed by: PHYSICAL THERAPIST

## 2019-05-20 PROCEDURE — 97530 THERAPEUTIC ACTIVITIES: CPT | Mod: GP | Performed by: PHYSICAL THERAPIST

## 2019-06-03 DIAGNOSIS — I10 ESSENTIAL HYPERTENSION: ICD-10-CM

## 2019-06-03 RX ORDER — LISINOPRIL 10 MG/1
10 TABLET ORAL DAILY
Qty: 90 TABLET | Refills: 0 | Status: SHIPPED | OUTPATIENT
Start: 2019-06-03 | End: 2019-08-28

## 2019-06-03 NOTE — TELEPHONE ENCOUNTER
Prescription approved per [facility] Refill Protocol.    Alda Rolle RN   [facility]

## 2019-06-03 NOTE — TELEPHONE ENCOUNTER
"Requested Prescriptions   Pending Prescriptions Disp Refills     lisinopril (PRINIVIL/ZESTRIL) 10 MG tablet 90 tablet 0     Sig: Take 1 tablet (10 mg) by mouth daily  Last Written Prescription Date:  03/14/2019  Last Fill Quantity: 90,  # refills: 0   Last office visit: 4/10/2019 with prescribing provider:  04/10/2019   Future Office Visit:         ACE Inhibitors (Including Combos) Protocol Passed - 6/3/2019 12:08 PM        Passed - Blood pressure under 140/90 in past 12 months     BP Readings from Last 3 Encounters:   04/10/19 138/80   03/06/19 (!) 142/92   11/17/18 (!) 143/99                 Passed - Recent (12 mo) or future (30 days) visit within the authorizing provider's specialty     Patient had office visit in the last 12 months or has a visit in the next 30 days with authorizing provider or within the authorizing provider's specialty.  See \"Patient Info\" tab in inbasket, or \"Choose Columns\" in Meds & Orders section of the refill encounter.              Passed - Medication is active on med list        Passed - Patient is age 18 or older        Passed - No active pregnancy on record        Passed - Normal serum creatinine on file in past 12 months     Recent Labs   Lab Test 03/06/19  1701   CR 0.56             Passed - Normal serum potassium on file in past 12 months     Recent Labs   Lab Test 03/06/19  1701   POTASSIUM 3.9             Passed - No positive pregnancy test within past 12 months        "

## 2019-06-24 ENCOUNTER — MYC MEDICAL ADVICE (OUTPATIENT)
Dept: FAMILY MEDICINE | Facility: CLINIC | Age: 49
End: 2019-06-24

## 2019-06-24 NOTE — TELEPHONE ENCOUNTER
Rupa,     Please see mychart message from patient with health updates.    Rupal Dixon RN  [facility]

## 2019-07-02 ENCOUNTER — TRANSFERRED RECORDS (OUTPATIENT)
Dept: HEALTH INFORMATION MANAGEMENT | Facility: CLINIC | Age: 49
End: 2019-07-02

## 2019-07-15 ENCOUNTER — ALLIED HEALTH/NURSE VISIT (OUTPATIENT)
Dept: NURSING | Facility: CLINIC | Age: 49
End: 2019-07-15
Payer: COMMERCIAL

## 2019-07-15 VITALS — SYSTOLIC BLOOD PRESSURE: 104 MMHG | DIASTOLIC BLOOD PRESSURE: 62 MMHG

## 2019-07-15 DIAGNOSIS — Z01.30 BP CHECK: Primary | ICD-10-CM

## 2019-07-15 PROCEDURE — 99207 ZZC NO CHARGE NURSE ONLY: CPT

## 2019-08-28 DIAGNOSIS — I10 ESSENTIAL HYPERTENSION: ICD-10-CM

## 2019-08-28 RX ORDER — LISINOPRIL 10 MG/1
10 TABLET ORAL DAILY
Qty: 90 TABLET | Refills: 1 | Status: SHIPPED | OUTPATIENT
Start: 2019-08-28 | End: 2020-02-13

## 2019-08-28 NOTE — TELEPHONE ENCOUNTER
Prescription approved per [facility] Refill Protocol. Veda Lafleur RN August 28, 2019 10:01 AM

## 2019-08-28 NOTE — TELEPHONE ENCOUNTER
"Requested Prescriptions   Pending Prescriptions Disp Refills     lisinopril (PRINIVIL/ZESTRIL) 10 MG tablet  Last Written Prescription Date:  6/3/19  Last Fill Quantity: 90,  # refills: 0   Last office visit: 4/10/2019 with prescribing provider:  4/10/19   Future Office Visit:     90 tablet 0     Sig: Take 1 tablet (10 mg) by mouth daily       ACE Inhibitors (Including Combos) Protocol Passed - 8/28/2019  9:35 AM        Passed - Blood pressure under 140/90 in past 12 months     BP Readings from Last 3 Encounters:   07/15/19 104/62   04/10/19 138/80   03/06/19 (!) 142/92                 Passed - Recent (12 mo) or future (30 days) visit within the authorizing provider's specialty     Patient had office visit in the last 12 months or has a visit in the next 30 days with authorizing provider or within the authorizing provider's specialty.  See \"Patient Info\" tab in inbasket, or \"Choose Columns\" in Meds & Orders section of the refill encounter.              Passed - Medication is active on med list        Passed - Patient is age 18 or older        Passed - No active pregnancy on record        Passed - Normal serum creatinine on file in past 12 months     Recent Labs   Lab Test 03/06/19  1701   CR 0.56             Passed - Normal serum potassium on file in past 12 months     Recent Labs   Lab Test 03/06/19  1701   POTASSIUM 3.9             Passed - No positive pregnancy test within past 12 months          "

## 2019-09-02 ENCOUNTER — MYC MEDICAL ADVICE (OUTPATIENT)
Dept: FAMILY MEDICINE | Facility: CLINIC | Age: 49
End: 2019-09-02

## 2019-09-03 NOTE — TELEPHONE ENCOUNTER
Rupa,  See pt Logan Rolle RN   [facility]      '

## 2019-10-02 ENCOUNTER — OFFICE VISIT (OUTPATIENT)
Dept: FAMILY MEDICINE | Facility: CLINIC | Age: 49
End: 2019-10-02
Payer: COMMERCIAL

## 2019-10-02 VITALS
RESPIRATION RATE: 18 BRPM | TEMPERATURE: 98.4 F | WEIGHT: 126 LBS | BODY MASS INDEX: 21.63 KG/M2 | SYSTOLIC BLOOD PRESSURE: 110 MMHG | HEART RATE: 78 BPM | OXYGEN SATURATION: 95 % | DIASTOLIC BLOOD PRESSURE: 72 MMHG

## 2019-10-02 DIAGNOSIS — I10 ESSENTIAL HYPERTENSION: Primary | ICD-10-CM

## 2019-10-02 DIAGNOSIS — Z23 NEED FOR PROPHYLACTIC VACCINATION AND INOCULATION AGAINST INFLUENZA: ICD-10-CM

## 2019-10-02 DIAGNOSIS — N95.1 MENOPAUSAL SYNDROME (HOT FLASHES): ICD-10-CM

## 2019-10-02 DIAGNOSIS — G44.209 TENSION HEADACHE: ICD-10-CM

## 2019-10-02 PROCEDURE — 80069 RENAL FUNCTION PANEL: CPT | Performed by: NURSE PRACTITIONER

## 2019-10-02 PROCEDURE — 90471 IMMUNIZATION ADMIN: CPT | Performed by: NURSE PRACTITIONER

## 2019-10-02 PROCEDURE — 90686 IIV4 VACC NO PRSV 0.5 ML IM: CPT | Performed by: NURSE PRACTITIONER

## 2019-10-02 PROCEDURE — 99214 OFFICE O/P EST MOD 30 MIN: CPT | Mod: 25 | Performed by: NURSE PRACTITIONER

## 2019-10-02 PROCEDURE — 36415 COLL VENOUS BLD VENIPUNCTURE: CPT | Performed by: NURSE PRACTITIONER

## 2019-10-02 NOTE — PROGRESS NOTES
"Subjective     Gerardo Mandujano is a 48 year old female who presents to clinic today for the following health issues:    HPI   Hypertension Follow-up      Do you check your blood pressure regularly outside of the clinic? Yes     Are you following a low salt diet? Yes    Are your blood pressures ever more than 140 on the top number (systolic) OR more   than 90 on the bottom number (diastolic), for example 140/90? No      How many servings of fruits and vegetables do you eat daily?  2-3    On average, how many sweetened beverages do you drink each day (soda, juice, sweet tea, etc)?   1    How many days per week do you miss taking your medication? 0    Reviewed and updated as needed this visit by Provider    Patient presents to clinic for follow-up of hypertension. She was last seen in clinic 6 months ago. She continues to take lisinopril 10mg once daily. She reports taking her blood pressure approximately 3 times per week at home; readings typically 110-130/70-80. She has not noticed any negative side effects of the medication. She continues to exercising regularly; enjoys yoga, swimming, and walking her dog. She reports being a healthy eater; mostly vegetarian.     She has noticed a few episodes of \"hot flashes the past few weeks\". The episodes last 2-3 minutes and will often occur at rest or if she is already warm. She is wondering if this is hormonal-related. She does not get her period; she had a hysterectomy (still has ovaries) 5-6 years ago.     Of note, she continues to have tension headaches; often related to neck/shoulder pain or stress. Reports the frequency of them have decreased in the past year; now once per month and typically last 2-3 days. She sees a chiropractor once per month, which has been helpful.    She would like a flu vaccine today.     Sarah Carl, student NP       Son started new online school which is working better because she doesn't have to discipline him for time use which allows them to " focus on other things.  This is working better for the family.  She still does get only 6-7 hours of sleep a night, which is an improvement, because she needs to be present for son's bedtime.    Review of Systems   ROS COMP: Constitutional, HEENT, cardiovascular, pulmonary, gi and gu systems are negative, except as otherwise noted.      Objective    /72 (BP Location: Right arm, Patient Position: Sitting, Cuff Size: Adult Regular)   Pulse 78   Temp 98.4  F (36.9  C) (Oral)   Resp 18   Wt 57.2 kg (126 lb)   LMP 11/05/2012   SpO2 95%   BMI 21.63 kg/m    Body mass index is 21.63 kg/m .  Physical Exam   GENERAL: healthy, alert and no distress  EYES: Eyes grossly normal to inspection, PERRL and conjunctivae and sclerae normal  NECK: no adenopathy, no asymmetry, masses, or scars and thyroid normal to palpation  RESP: lungs clear to auscultation - no rales, rhonchi or wheezes  CV: regular rate and rhythm, normal S1 S2, no S3 or S4, no murmur, click or rub, no peripheral edema and peripheral pulses strong  MS: no gross musculoskeletal defects noted, no edema  NEURO: Normal strength and tone, mentation intact and speech normal    Diagnostic Test Results:  Labs reviewed in Epic  pending        Assessment & Plan     (Z23) Need for prophylactic vaccination and inoculation against influenza  (primary encounter diagnosis)  Comment: Recommend flu vaccine.    Plan: INFLUENZA VACCINE IM > 6 MONTHS VALENT IIV4         [78694]        Flu vaccine administered.    (I10) Essential hypertension  Comment:   Plan: Continue lisinopril 10mg once daily. We will plan to reassess blood pressure in 6 months (at annual wellness exam) and determine if blood pressure can be managed on an annual basis.     (G44.209) Tension headache  Comment:   Plan: Continue to monitor for frequency of tension headaches. Continue to work with chiropractor.     (N95.1) Menopausal syndrome (hot flashes)  Comment: It is likely the symptoms Gerardo is having  is related to menopausal changes.  Plan: Patient is not having any other signs of menopause. If hot flashes worsen or menopause symptoms become bothersome, may follow-up with menopausal clinic.        Patient Instructions     Cholesterol at next physical - come fasting 10-12 hours      Patient Education     Understanding Menopause  Menopause marks the point where you ve gone 12 months in a row without a period. The average age for this is around 51, but it can happen at younger or older ages. During the months or years before menopause, your body goes through many changes. It may be helpful to understand these changes and what you can do about the symptoms that result.     Use a portable fan to help stay cool.    Symptoms  Perimenopause is sometimes called the menopause transition. It happens in the months or years before menopause. It may begin when you reach your mid-40s. During this time, your estrogen levels go up and down and then decrease. As a result, you may notice some of the following symptoms:    Menstrual periods that come more or less often than usual    Menstrual periods that are lighter or heavier than normal    Increased premenstrual syndrome (PMS) symptoms    Hot flashes    Night sweats    Mood swings    Vaginal dryness with possible painful sexual activity    Difficulty going to sleep or staying asleep    Decreased sexual drive and function    Urinating frequently  It is important to remember that you could become pregnant until 12 months have passed since your last menstrual period. Ask your healthcare provider about birth control choices.   Controlling symptoms  Your healthcare provider may suggest pills or an intrauterine device (IUD) that contain the hormone progesterone. This can make your periods more regular and prevent excess bleeding. If you have symptoms due to lower estrogen levels, your healthcare provider may suggest pills that contain estrogen and/or progesterone. This is called  hormone therapy (HT).  There are also other prescription medicines that help control some of the bothersome symptoms, like hot flashes, mood swings, and vaginal dryness.  Other ways for you to deal with symptoms are listed below.    Hot flashes. Wear layers that you can remove. Try all-cotton clothing, sheets, and blankets. Keep a glass of cold water by your bed.    Pain during sex. You can buy a water-based lubricant or vaginal moisturizer in the drugstore that may help. Your healthcare provider may also prescribe an estrogen cream for your vagina.    Mood swings. Talking to friends who are going through the same changes can sometimes help.  Date Last Reviewed: 12/1/2016 2000-2018 [employer]. [address] [place] [address], [place] [address]. All rights reserved. This information is not intended as a substitute for professional medical care. Always follow your healthcare professional's instructions.               Return in about 6 months (around 4/2/2020) for BP Recheck, Physical Exam.    MANUEL Lopez [facility]

## 2019-10-02 NOTE — PATIENT INSTRUCTIONS
Cholesterol at next physical - come fasting 10-12 hours      Patient Education     Understanding Menopause  Menopause marks the point where you ve gone 12 months in a row without a period. The average age for this is around 51, but it can happen at younger or older ages. During the months or years before menopause, your body goes through many changes. It may be helpful to understand these changes and what you can do about the symptoms that result.     Use a portable fan to help stay cool.    Symptoms  Perimenopause is sometimes called the menopause transition. It happens in the months or years before menopause. It may begin when you reach your mid-40s. During this time, your estrogen levels go up and down and then decrease. As a result, you may notice some of the following symptoms:    Menstrual periods that come more or less often than usual    Menstrual periods that are lighter or heavier than normal    Increased premenstrual syndrome (PMS) symptoms    Hot flashes    Night sweats    Mood swings    Vaginal dryness with possible painful sexual activity    Difficulty going to sleep or staying asleep    Decreased sexual drive and function    Urinating frequently  It is important to remember that you could become pregnant until 12 months have passed since your last menstrual period. Ask your healthcare provider about birth control choices.   Controlling symptoms  Your healthcare provider may suggest pills or an intrauterine device (IUD) that contain the hormone progesterone. This can make your periods more regular and prevent excess bleeding. If you have symptoms due to lower estrogen levels, your healthcare provider may suggest pills that contain estrogen and/or progesterone. This is called hormone therapy (HT).  There are also other prescription medicines that help control some of the bothersome symptoms, like hot flashes, mood swings, and vaginal dryness.  Other ways for you to deal with symptoms are listed  below.    Hot flashes. Wear layers that you can remove. Try all-cotton clothing, sheets, and blankets. Keep a glass of cold water by your bed.    Pain during sex. You can buy a water-based lubricant or vaginal moisturizer in the drugstore that may help. Your healthcare provider may also prescribe an estrogen cream for your vagina.    Mood swings. Talking to friends who are going through the same changes can sometimes help.  Date Last Reviewed: 12/1/2016 2000-2018 [employer]. [address] [place] [address], [place] [address]. All rights reserved. This information is not intended as a substitute for professional medical care. Always follow your healthcare professional's instructions.

## 2019-10-03 LAB
ALBUMIN SERPL-MCNC: 4.1 G/DL (ref 3.4–5)
ANION GAP SERPL CALCULATED.3IONS-SCNC: 10 MMOL/L (ref 3–14)
BUN SERPL-MCNC: 15 MG/DL (ref 7–30)
CALCIUM SERPL-MCNC: 8.9 MG/DL (ref 8.5–10.1)
CHLORIDE SERPL-SCNC: 105 MMOL/L (ref 94–109)
CO2 SERPL-SCNC: 23 MMOL/L (ref 20–32)
CREAT SERPL-MCNC: 0.61 MG/DL (ref 0.52–1.04)
GFR SERPL CREATININE-BSD FRML MDRD: >90 ML/MIN/{1.73_M2}
GLUCOSE SERPL-MCNC: 97 MG/DL (ref 70–99)
PHOSPHATE SERPL-MCNC: 3 MG/DL (ref 2.5–4.5)
POTASSIUM SERPL-SCNC: 3.9 MMOL/L (ref 3.4–5.3)
SODIUM SERPL-SCNC: 139 MMOL/L (ref 133–144)

## 2019-10-04 NOTE — RESULT ENCOUNTER NOTE
Gerardo,    Your labs were all normal.  If you have any questions, please feel free to contact the clinic.    MELANIE Linton

## 2019-10-19 PROBLEM — M54.50 CHRONIC RIGHT-SIDED LOW BACK PAIN WITHOUT SCIATICA: Status: RESOLVED | Noted: 2019-05-06 | Resolved: 2019-10-19

## 2019-10-19 PROBLEM — G89.29 CHRONIC RIGHT-SIDED LOW BACK PAIN WITHOUT SCIATICA: Status: RESOLVED | Noted: 2019-05-06 | Resolved: 2019-10-19

## 2019-10-19 NOTE — PROGRESS NOTES
Discharge Note    Progress reporting period is from May 6, 2019 to May 20, 2019.     Gerardo failed to return for next follow up visit and current status is unknown.  Please see information below for last relevant information on current status.  Patient seen for Rxs Used: 2 visits.  SUBJECTIVE  Subjective changes noted by patient:  Subjective: pt reports things going decent. continues to have pain across mid-back. doing stretches and swimming. pain gets up to 5/10 in low back. has not had any groin issues lately, ever since the chiro adjustment. has had very minor twinges occasionally. sitting is still something that bothers her, or other forward motions. doing more REIL>DYLAN, does not feel significant difference after, performing about 4x/day.  .  Current pain level is Current Pain level: 3/10.     Previous pain level was  Initial Pain level: 6/10.   Changes in function:  Yes (See Goal flowsheet attached for changes in current functional level)  Adverse reaction to treatment or activity: None    OBJECTIVE  Changes noted in objective findings: Objective: baseline: R>L LBP. lumbar AROM: flexion to floor - NE(tight). extension min limit - NE. R SB - WNL. L SB - min limit.  after REIL lock/sag: incr L SB ROM to WNL. hypomobile thoracolumbar spine. incr tone/tenderness R TFL. decr pulling with L SB after.      ASSESSMENT/PLAN  Diagnosis:  Right lumbar derangement   DIAGNOSIS:   Right lumbar derangement  Updated problem list and treatment plan:   Pain - HEP  Decreased ROM/flexibility - HEP  Decreased function - HEP  Decreased strength - HEP  STG/LTGs have been met or progress has been made towards goals:  Yes, please see goal flowsheet for most current information  Assessment of Progress: current status is unknown.  Last current status: Assessment of progress: Pt is progressing as expected   Self Management Plans:  HEP  I have re-evaluated this patient and find that the nature, scope, duration and intensity of the therapy  is appropriate for the medical condition of the patient.  Gerardo continues to require the following intervention to meet STG and LTG's:  HEP.    Recommendations:  Discharge with current home program.  Patient to follow up with MD as needed.    Please refer to the daily flowsheet for treatment today, total treatment time and time spent performing 1:1 timed codes.

## 2019-12-30 ENCOUNTER — OFFICE VISIT (OUTPATIENT)
Dept: ORTHOPEDICS | Facility: CLINIC | Age: 49
End: 2019-12-30
Payer: COMMERCIAL

## 2019-12-30 ENCOUNTER — ANCILLARY PROCEDURE (OUTPATIENT)
Dept: GENERAL RADIOLOGY | Facility: CLINIC | Age: 49
End: 2019-12-30
Attending: FAMILY MEDICINE
Payer: COMMERCIAL

## 2019-12-30 VITALS — WEIGHT: 130 LBS | BODY MASS INDEX: 22.2 KG/M2 | HEIGHT: 64 IN

## 2019-12-30 DIAGNOSIS — M79.672 LEFT FOOT PAIN: Primary | ICD-10-CM

## 2019-12-30 DIAGNOSIS — M79.672 LEFT FOOT PAIN: ICD-10-CM

## 2019-12-30 ASSESSMENT — MIFFLIN-ST. JEOR: SCORE: 1199.68

## 2019-12-30 NOTE — Clinical Note
12/30/2019       RE: Gerardo Mandujano  [address]  [facility] [id number]     Dear Colleague,    Thank you for referring your patient, Gerardo Mandujano, to the [facility] at [facility]. Please see a copy of my visit note below.          SPORTS & ORTHOPEDIC WALK-IN VISIT 12/30/2019    Primary Care Physician: Dr. Goyal    12/16/19 - is about when she noticed the left foot pain. Does not recall any overuse or injury to the area. Notices the pain when walking, also has increased pain with WB plantarflexion.    Pain is located on the dorsum of her foot. Isolated to between the 2nd and 3rd MTP.  TTP. Pain with passive extension of toes.    Reason for visit:     What part of your body is injured / painful?  left foot    What caused the injury /pain? Unsure    How long ago did your injury occur or pain begin? several weeks ago    What are your most bothersome symptoms? Pain and Swelling    How would you characterize your symptom?  aching and dull    What makes your symptoms better? Rest, advil    What makes your symptoms worse? Standing and Walking    Have you been previously seen for this problem? No    Medical History:    Any recent changes to your medical history? No    Any new medication prescribed since last visit? No    Have you had surgery on this body part before? 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 No    Do you have any vision problems? No    Do you have any chest pain or edema? No    Do you have any shortness of breath or wheezing?  No    Do you have stomach problems? No    Do you have any numbness or focal weakness? No    Do you have diabetes? No    Do you have problems with bleeding or clotting? No    Do you have an rashes or other skin lesions? No         Past Medical  "History, Current Medications, and Allergies are reviewed in the electronic medical record as appropriate.       EXAM:Ht 1.626 m (5' 4\")   Wt 59 kg (130 lb)   LMP 11/05/2012   BMI 22.31 kg/m       ***    Imaging: ***      Assessment: Patient is a 49 year old {GENDER:[id number]::\"male\"} with ***    Recommendations: ***                Again, thank you for allowing me to participate in the care of your patient.      Sincerely,    Yoan Casey MD    "

## 2019-12-31 NOTE — PROGRESS NOTES
"      SPORTS & ORTHOPEDIC WALK-IN VISIT 12/30/2019    Primary Care Physician: Dr. Goyal    The patient is here today for left foot pain.  Began without inciting event or trauma several weeks ago.  Describes a aching throbbing pain.  She occasionally does have this pain at rest particularly at the end of the day.  Is worse with walking and weightbearing.  Worse when barefoot.  She has not noted any swelling or bruising.  No prior history of pain in this region.  Worse with extension of the toes.  Localizes pain to the dorsal aspect of the foot near the second and third toes.    Reason for visit:     What part of your body is injured / painful?  left foot    What caused the injury /pain? Unsure    How long ago did your injury occur or pain begin? several weeks ago    What are your most bothersome symptoms? Pain and Swelling    How would you characterize your symptom?  aching and dull    What makes your symptoms better? Rest, advil    What makes your symptoms worse? Standing and Walking    Have you been previously seen for this problem? No    Medical History:    Any recent changes to your medical history? No    Any new medication prescribed since last visit? No    Have you had surgery on this body part before? 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 No    Do you have any vision problems? No    Do you have any chest pain or edema? No    Do you have any shortness of breath or wheezing?  No    Do you have stomach problems? No    Do you have any numbness or focal weakness? No    Do you have diabetes? No    Do you have problems with bleeding or clotting? No    Do you have an rashes or other skin lesions? No         Past Medical History, Current Medications, and Allergies are reviewed in the electronic medical record as appropriate.       EXAM:Ht 1.626 m (5' 4\")   " Wt 59 kg (130 lb)   LMP 11/05/2012   BMI 22.31 kg/m      General: Alert, pleasant, no distress  Left foot: warm, well perfused, SILT throughout     Inspection: No soft tissue swelling, ecchymosis, deformity.     ROM: Full active and passive ROM of the ankle and foot.  However, pain with passive extension of the second and third toes.     Strength: Is able to plantarflex and dorsiflex the foot and toes against resistance without significant discomfort.     Palpation: TTP over the distal second and third metatarsal near the metatarsal heads.  No TTP of the remaining metatarsals or phalanges.  No palpable nodule.     Special Tests: Pain with compression of the metatarsal heads.  No pain with axial loading of the metatarsals.      Imaging: X-rays of the left foot are performed and reviewed independently demonstrating no acute fracture or dislocation.  No periosteal reaction noted.  See EMR for formal radiology report.      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  However, stress reaction is considered as well.    Recommendations:   Reviewed imaging and assessment with the patient in detail  Discussed options for treatment.  Discussed footwear modifications in detail.  This could include more supportive footwear that she Sammy owns versus postoperative shoe.  Ultimately she opted to take postop shoe.  She can discontinue postop shoe when comfortable.  Also discussed padding and taping.  Consider icing and NSAIDs PRN  If having significant point tenderness over the metatarsals in 2 weeks have recommended follow-up for reevaluation.    Yoan Casey MD

## 2020-01-03 ENCOUNTER — MYC MEDICAL ADVICE (OUTPATIENT)
Dept: FAMILY MEDICINE | Facility: CLINIC | Age: 50
End: 2020-01-03

## 2020-01-03 DIAGNOSIS — M79.672 LEFT FOOT PAIN: Primary | ICD-10-CM

## 2020-01-16 ENCOUNTER — OFFICE VISIT (OUTPATIENT)
Dept: PODIATRY | Facility: CLINIC | Age: 50
End: 2020-01-16
Payer: COMMERCIAL

## 2020-01-16 VITALS
DIASTOLIC BLOOD PRESSURE: 70 MMHG | WEIGHT: 130 LBS | SYSTOLIC BLOOD PRESSURE: 112 MMHG | BODY MASS INDEX: 22.2 KG/M2 | HEIGHT: 64 IN

## 2020-01-16 DIAGNOSIS — M79.672 LEFT FOOT PAIN: ICD-10-CM

## 2020-01-16 DIAGNOSIS — M84.30XA STRESS REACTION OF BONE: Primary | ICD-10-CM

## 2020-01-16 PROCEDURE — 99243 OFF/OP CNSLTJ NEW/EST LOW 30: CPT | Performed by: PODIATRIST

## 2020-01-16 ASSESSMENT — MIFFLIN-ST. JEOR: SCORE: 1199.68

## 2020-01-16 NOTE — PATIENT INSTRUCTIONS
Thank you for choosing [facility] Podiatry / Foot & Ankle Surgery!    DR. COSTELLO[facility]S CLIN[facility] LOCATIONS     MONDAY - [place] WEDNESDAY (AM ONLY) - [place]   [address] [place] [address] [address] [address]   [phone] / [phone] / [phone]       THURSDAY - [place] SCHEDULE SURGERY: [phone]   [address] [facility] [address] APPOINTMENTS: [phone]   [place] [address] BILLING QUESTIONS: [phone] / [phone] TRIAGE NURSE: [phone]     [facility] LOCATIONS  [facility]  [address]  [place] [address]  Phone: [phone]  Fax: [phone] [facility]  [address]  [place] [address]  Phone: [phone]   Fax: [phone]   [facility]  [address]  [place] [address]  Phone: [phone]  Fax: [phone] [facility]  [address]  [place] [address]  Phone: [phone]   Fax: [phone]   [facility]   [address]  [place] [address]  Phone: [phone]  Fax: [phone] * Please call any location listed to make an appointment for a casting/fitting. Your referral was sent to their central office and they will all have the order on file.     .STRESS FRACTURES   Stress fractures are fractures or near fractures caused by repetitive overuse, rather than one acute injury. The analogy is bending a paper clip repeatedly until it snaps. These are the most common reason for pain on the top ofthe foot. They are usually worse with activity and improve with rest. Frequently patients report buying a new shoe or having a sudden increase in activity, such as beginning a walking program or moving, a few days to a few weeks prior to the pain starting.    Stress fractures rarely show up on x-ray unless there has been significant damage to the bone. They  are usually diagnosed based on symptoms. Sometimes they show up on bone scans or MRIs, but these tests are not necessary for diagnosis.     Treatment ranges from decreasing activity to walking casts to crutches. Most patients respond best to a removable walking cast, worn for 4 weeks. This should be worn at all times, except when sleeping, showering, and driving. This removable cast makes the foot pain go away relatively quickly, however, it is clumsy and may irritate knees, hips, and lower back. Sometimes patients wear a thick soled shoe on the other foot to minimize the difference in height ofthe walking cast.   Once the symptoms are gone, patients are slowly returned to regular activity. Too rapid ofa return will cause the stress fracture to recur. Some patients have custom inserts made for their shoes to help distribute forces more evenly, minimizing recurrence.   Stress fractures often develop into complete fractures if not diagnosed early and treated aggressively. If complete fracture occurs, prolonged use of crutches without weight on the foot and possibly surgery may be needed in order to heal.

## 2020-01-16 NOTE — PROGRESS NOTES
"  ASSESSMENT/PLAN:    Encounter Diagnoses   Name Primary?     Stress reaction of bone left foot Yes     Left foot pain      Clinical exam, activity level and foot structure support the diagnoses of stress reaction of bone, a precursor to stress fracture.    Since she reports improvement, current plan is adequate.   I advised another 2 weeks in the surgical shoe.  She was encouraged to place an arch support in the shoe.  Relative rest.  I reviewed the XR images with the patient.  The relevant anatomy and function was discussed, in relation to the problem.    Follow up in 3 weeks.    Gerardo Mandujano is referred to [facility] for prescription orthoses.  This is an option for future prevention.    Body mass index is 22.31 kg/m .          Jann Boyd DPM, FACKASHIF, MS    [facility] Ankle [facility]      ____________________________________________________________________  HPI:       I was asked by Liana Gyoal CNP,  to evaluate this patient, in consultation, regarding left foot pain.     Chief Complaint: left foot pain  Onset of problem: 1 month  Pain/ discomfort is described as:  Pain on top of foot and in arch of foot  Ratin/10 at worst   Frequency:  daily    The pain is made worse with prolonged walking and when not wearing supportive foot wear.   Previous treatment: \"foot support\"    Her primary location of pain is over the 2nd metatarsal. She has noted some swelling. She also has intermittent plantar arch and lateral column pain. Previously walking her dogs an hour a day and involved in yoga.     *  Patient Active Problem List   Diagnosis     CARDIOVASCULAR SCREENING; LDL GOAL LESS THAN 160     Tension headache     Headache     Neck pain     Spasm of muscle     Arthralgia of temporomandibular joint     Benign essential hypertension   *  *  Past Surgical History:   Procedure Laterality Date     C ANESTH, SECTION  2007     LAPAROSCOPIC HYSTERECTOMY " SUPRACERVICAL  12/21/2012    benign path   *  *  Current Outpatient Medications   Medication Sig Dispense Refill     cyclobenzaprine (FLEXERIL) 5 MG tablet Take 5 mg by mouth 3 times daily as needed for muscle spasms       FERROUS GLUCONATE Reported on 3/22/2017       fluticasone (FLONASE) 50 MCG/ACT spray Spray 1-2 sprays into both nostrils daily (Patient not taking: Reported on 3/6/2019) 1 Bottle 0     gabapentin (NEURONTIN) 100 MG capsule Take 1 capsule (100 mg) by mouth 3 times daily 90 capsule 3     ibuprofen 200 MG capsule Take 200 mg by mouth as needed       lisinopril (PRINIVIL/ZESTRIL) 10 MG tablet Take 1 tablet (10 mg) by mouth daily 90 tablet 1     order for DME Equipment being ordered: Digital home blood pressure monitor kit 1 each 0     VITAMIN D, CHOLECALCIFEROL, PO Take  by mouth daily.         ROS:     A 10-point review of systems was performed and is positive for that noted above in the HPI and as seen below.  All other areas are negative.     Numbness in feet?  no   Calf pain with walking? yes  Recent foot/ankle injury? no  Weight change over past 12 months? no  Self perception as overweight? no  Recent flu-like symptoms? no  Joint pain other than feet ? no    Social History: Employment:  student;  Exercise/Physical activity:  no;  Tobacco use:  no  Social History     Socioeconomic History     Marital status:      Spouse name: Not on file     Number of children: Not on file     Years of education: Not on file     Highest education level: Not on file   Occupational History     Not on file   Social Needs     Financial resource strain: Not on file     Food insecurity:     Worry: Not on file     Inability: Not on file     Transportation needs:     Medical: Not on file     Non-medical: Not on file   Tobacco Use     Smoking status: Never Smoker     Smokeless tobacco: Never Used   Substance and Sexual Activity     Alcohol use: Yes     Comment: 0-1 drink per week     Drug use: No     Sexual  activity: Yes     Partners: Female     Comment: monogamous relationship   Lifestyle     Physical activity:     Days per week: Not on file     Minutes per session: Not on file     Stress: Not on file   Relationships     Social connections:     Talks on phone: Not on file     Gets together: Not on file     Attends Confucianism service: Not on file     Active member of club or organization: Not on file     Attends meetings of clubs or organizations: Not on file     Relationship status: Not on file     Intimate partner violence:     Fear of current or ex partner: Not on file     Emotionally abused: Not on file     Physically abused: Not on file     Forced sexual activity: Not on file   Other Topics Concern      Service No     Blood Transfusions No     Caffeine Concern No     Occupational Exposure No     Hobby Hazards No     Sleep Concern No     Stress Concern No     Weight Concern Yes     Comment: Feels she is underweight     Special Diet No     Back Care No     Exercise Yes     Comment: Yoga twice weekly     Bike Helmet Yes     Seat Belt Yes     Self-Exams No     Parent/sibling w/ CABG, MI or angioplasty before 65F 55M? No   Social History Narrative        Balanced Diet - Yes    Osteoporosis Prevention Measures - Dairy servings per day: 2    Regular Exercise -  No Describe     Dental Exam - YES - Date: 12/09    Eye Exam - YES - Date: 2008    Self Breast Exam - Yes    Abuse: Current or Past (Physical, Sexual or Emotional)- No    Do you feel safe in your environment - Yes    Guns stored in the home - No    Sunscreen used - Yes    Seatbelts used - Yes    Lipids -  YES - Date: 10/7/08    Glucose -  NO    Colon Cancer Screening - No    Hemoccults - UNKNOWN    Pap Test -  YES - Date: 10/7/08    Do you have any concerns about STD's -  No    Mammography - YES - Date: pt had mammo 10 yrs ago due to breast lumps    DEXA - NO    Immunizations reviewed and up to date - No, ? Last JOSE LUIS Mendiola, JESSE                   Family  "history:  Family History   Problem Relation Age of Onset     Gynecology Mother         fibroids     Hypertension Father      Lipids Father      Cancer Maternal Grandmother         throat     Breast Cancer Paternal Grandmother      Cancer - colorectal Paternal Grandfather        Rheumatoid arthritis:  no  Foot Problems: no  Diabetes: no      EXAM:    Vitals: /70   Ht 1.626 m (5' 4\")   Wt 59 kg (130 lb)   LMP 11/05/2012   BMI 22.31 kg/m    BMI: Body mass index is 22.31 kg/m .  Height: 5' 4\"    Constitutional/ general:  Pt is in no apparent distress, appears well-nourished.  Cooperative with history and physical exam.      Vascular:  Pedal pulses are palpable bilaterally for both the DP and PT arteries.  CFT < 3 sec.      Neuro:  Alert and oriented x 3. Coordinated gait.  Light touch sensation is intact to the L4, L5, S1 distributions. No obvious deficits.  No evidence of neurological-based weakness, spasticity, or contracture in the lower extremities.     Derm: Normal texture and turgor.  No erythema, ecchymosis, or cyanosis.  No open lesions.     Musculoskeletal:    Lower extremity muscle strength is normal.  Patient is ambulatory without an assistive device or brace .  Decrease in medial longitudinal arch with weight bearing.  Mild hallux abducto valgus on the right.  Tenderness with palpation over the left 2nd metatarsophalangeal joint. There is mild dorsal edema.     EXAMINATION: XR FOOT LT G/E 3 VW  12/30/2019 6:29 PM      CLINICAL HISTORY:  left second and third metatarsal pain; Left foot  pain      COMPARISON: None available     FINDINGS: AP, oblique and lateral views of the left foot were  obtained. There is no comparison available. The tarsal bones are  normally aligned. The joint spaces are preserved. There is a little  well-corticated fragment noted just proximal to the 2 sesamoid bones  associated with the first digits metatarsal head. No evidence of acute  osseous abnormalities.     Mild early " subchondral sclerosis of the first MTP joint.                                                                      IMPRESSION:   1. No definite evidence of acute osseous abnormalities.  2. Mild subchondral sclerosis of the first MTP joint.     JUTTA ELLERMANN, MD

## 2020-01-16 NOTE — LETTER
"    2020         RE: Gerardo Mandujano  [address]  [facility] [address]        Dear Colleague,    Thank you for referring your patient, Gerardo Mandujano, to the [facility]. Please see a copy of my visit note below.      ASSESSMENT/PLAN:    Encounter Diagnoses   Name Primary?     Stress reaction of bone left foot Yes     Left foot pain      Clinical exam, activity level and foot structure support the diagnoses of stress reaction of bone, a precursor to stress fracture.    Since she reports improvement, current plan is adequate.   I advised another 2 weeks in the surgical shoe.  She was encouraged to place an arch support in the shoe.  Relative rest.  I reviewed the XR images with the patient.  The relevant anatomy and function was discussed, in relation to the problem.    Follow up in 3 weeks.    Gerardo Mandujano is referred to [facility] for prescription orthoses.  This is an option for future prevention.    Body mass index is 22.31 kg/m .          Jann Boyd DPM, FACFAS, MS    [facility]le [facility]      ____________________________________________________________________  HPI:       I was asked by Liana Goyal CNP,  to evaluate this patient, in consultation, regarding left foot pain.     Chief Complaint: left foot pain  Onset of problem: 1 month  Pain/ discomfort is described as:  Pain on top of foot and in arch of foot  Ratin/10 at worst   Frequency:  daily    The pain is made worse with prolonged walking and when not wearing supportive foot wear.   Previous treatment: \"foot support\"    Her primary location of pain is over the 2nd metatarsal. She has noted some swelling. She also has intermittent plantar arch and lateral column pain. Previously walking her dogs an hour a day and involved in yoga.     *  Patient Active Problem List   Diagnosis     CARDIOVASCULAR SCREENING; LDL GOAL LESS THAN 160     Tension headache     Headache     " Neck pain     Spasm of muscle     Arthralgia of temporomandibular joint     Benign essential hypertension   *  *  Past Surgical History:   Procedure Laterality Date     C ANESTH, SECTION  2007     LAPAROSCOPIC HYSTERECTOMY SUPRACERVICAL  2012    benign path   *  *  Current Outpatient Medications   Medication Sig Dispense Refill     cyclobenzaprine (FLEXERIL) 5 MG tablet Take 5 mg by mouth 3 times daily as needed for muscle spasms       FERROUS GLUCONATE Reported on 3/22/2017       fluticasone (FLONASE) 50 MCG/ACT spray Spray 1-2 sprays into both nostrils daily (Patient not taking: Reported on 3/6/2019) 1 Bottle 0     gabapentin (NEURONTIN) 100 MG capsule Take 1 capsule (100 mg) by mouth 3 times daily 90 capsule 3     ibuprofen 200 MG capsule Take 200 mg by mouth as needed       lisinopril (PRINIVIL/ZESTRIL) 10 MG tablet Take 1 tablet (10 mg) by mouth daily 90 tablet 1     order for DME Equipment being ordered: Digital home blood pressure monitor kit 1 each 0     VITAMIN D, CHOLECALCIFEROL, PO Take  by mouth daily.         ROS:     A 10-point review of systems was performed and is positive for that noted above in the HPI and as seen below.  All other areas are negative.     Numbness in feet?  no   Calf pain with walking? yes  Recent foot/ankle injury? no  Weight change over past 12 months? no  Self perception as overweight? no  Recent flu-like symptoms? no  Joint pain other than feet ? no    Social History: Employment:  student;  Exercise/Physical activity:  no;  Tobacco use:  no  Social History     Socioeconomic History     Marital status:      Spouse name: Not on file     Number of children: Not on file     Years of education: Not on file     Highest education level: Not on file   Occupational History     Not on file   Social Needs     Financial resource strain: Not on file     Food insecurity:     Worry: Not on file     Inability: Not on file     Transportation needs:     Medical: Not on file      Non-medical: Not on file   Tobacco Use     Smoking status: Never Smoker     Smokeless tobacco: Never Used   Substance and Sexual Activity     Alcohol use: Yes     Comment: 0-1 drink per week     Drug use: No     Sexual activity: Yes     Partners: Female     Comment: monogamous relationship   Lifestyle     Physical activity:     Days per week: Not on file     Minutes per session: Not on file     Stress: Not on file   Relationships     Social connections:     Talks on phone: Not on file     Gets together: Not on file     Attends Latter-day service: Not on file     Active member of club or organization: Not on file     Attends meetings of clubs or organizations: Not on file     Relationship status: Not on file     Intimate partner violence:     Fear of current or ex partner: Not on file     Emotionally abused: Not on file     Physically abused: Not on file     Forced sexual activity: Not on file   Other Topics Concern      Service No     Blood Transfusions No     Caffeine Concern No     Occupational Exposure No     Hobby Hazards No     Sleep Concern No     Stress Concern No     Weight Concern Yes     Comment: Feels she is underweight     Special Diet No     Back Care No     Exercise Yes     Comment: Yoga twice weekly     Bike Helmet Yes     Seat Belt Yes     Self-Exams No     Parent/sibling w/ CABG, MI or angioplasty before 65F 55M? No   Social History Narrative        Balanced Diet - Yes    Osteoporosis Prevention Measures - Dairy servings per day: 2    Regular Exercise -  No Describe     Dental Exam - YES - Date: 12/09    Eye Exam - YES - Date: 2008    Self Breast Exam - Yes    Abuse: Current or Past (Physical, Sexual or Emotional)- No    Do you feel safe in your environment - Yes    Guns stored in the home - No    Sunscreen used - Yes    Seatbelts used - Yes    Lipids -  YES - Date: 10/7/08    Glucose -  NO    Colon Cancer Screening - No    Hemoccults - UNKNOWN    Pap Test -  YES - Date: 10/7/08    Do you  "have any concerns about STD's -  No    Mammography - YES - Date: pt had mammo 10 yrs ago due to breast lumps    DEXA - NO    Immunizations reviewed and up to date - No, ? Last TD    DALE Mendiola CMA                   Family history:  Family History   Problem Relation Age of Onset     Gynecology Mother         fibroids     Hypertension Father      Lipids Father      Cancer Maternal Grandmother         throat     Breast Cancer Paternal Grandmother      Cancer - colorectal Paternal Grandfather        Rheumatoid arthritis:  no  Foot Problems: no  Diabetes: no      EXAM:    Vitals: /70   Ht 1.626 m (5' 4\")   Wt 59 kg (130 lb)   LMP 11/05/2012   BMI 22.31 kg/m     BMI: Body mass index is 22.31 kg/m .  Height: 5' 4\"    Constitutional/ general:  Pt is in no apparent distress, appears well-nourished.  Cooperative with history and physical exam.      Vascular:  Pedal pulses are palpable bilaterally for both the DP and PT arteries.  CFT < 3 sec.      Neuro:  Alert and oriented x 3. Coordinated gait.  Light touch sensation is intact to the L4, L5, S1 distributions. No obvious deficits.  No evidence of neurological-based weakness, spasticity, or contracture in the lower extremities.     Derm: Normal texture and turgor.  No erythema, ecchymosis, or cyanosis.  No open lesions.     Musculoskeletal:    Lower extremity muscle strength is normal.  Patient is ambulatory without an assistive device or brace .  Decrease in medial longitudinal arch with weight bearing.  Mild hallux abducto valgus on the right.  Tenderness with palpation over the left 2nd metatarsophalangeal joint. There is mild dorsal edema.     EXAMINATION: XR FOOT LT G/E 3 VW  12/30/2019 6:29 PM      CLINICAL HISTORY:  left second and third metatarsal pain; Left foot  pain      COMPARISON: None available     FINDINGS: AP, oblique and lateral views of the left foot were  obtained. There is no comparison available. The tarsal bones are  normally aligned. The joint " spaces are preserved. There is a little  well-corticated fragment noted just proximal to the 2 sesamoid bones  associated with the first digits metatarsal head. No evidence of acute  osseous abnormalities.     Mild early subchondral sclerosis of the first MTP joint.                                                                      IMPRESSION:   1. No definite evidence of acute osseous abnormalities.  2. Mild subchondral sclerosis of the first MTP joint.     JUTTA ELLERMANN, MD            Again, thank you for allowing me to participate in the care of your patient.        Sincerely,        Jann Boyd DPM

## 2020-01-24 ENCOUNTER — MYC MEDICAL ADVICE (OUTPATIENT)
Dept: FAMILY MEDICINE | Facility: CLINIC | Age: 50
End: 2020-01-24

## 2020-01-27 NOTE — TELEPHONE ENCOUNTER
Rupa,    Please see patients my chart message.    Thanks  Marika Schilling RN   [facility]

## 2020-01-31 ENCOUNTER — ANCILLARY PROCEDURE (OUTPATIENT)
Dept: GENERAL RADIOLOGY | Facility: CLINIC | Age: 50
End: 2020-01-31
Attending: FAMILY MEDICINE
Payer: COMMERCIAL

## 2020-01-31 ENCOUNTER — E-VISIT (OUTPATIENT)
Dept: FAMILY MEDICINE | Facility: CLINIC | Age: 50
End: 2020-01-31
Payer: COMMERCIAL

## 2020-01-31 ENCOUNTER — OFFICE VISIT (OUTPATIENT)
Dept: ORTHOPEDICS | Facility: CLINIC | Age: 50
End: 2020-01-31
Payer: COMMERCIAL

## 2020-01-31 VITALS — HEIGHT: 64 IN | RESPIRATION RATE: 16 BRPM | BODY MASS INDEX: 22.36 KG/M2 | WEIGHT: 131 LBS

## 2020-01-31 DIAGNOSIS — R10.31 RIGHT GROIN PAIN: ICD-10-CM

## 2020-01-31 DIAGNOSIS — G44.209 TENSION HEADACHE: ICD-10-CM

## 2020-01-31 DIAGNOSIS — M79.604 LOW BACK PAIN RADIATING TO RIGHT LEG: Primary | ICD-10-CM

## 2020-01-31 DIAGNOSIS — M54.50 LOW BACK PAIN RADIATING TO RIGHT LEG: Primary | ICD-10-CM

## 2020-01-31 DIAGNOSIS — M54.16 LUMBAR BACK PAIN WITH RADICULOPATHY AFFECTING LOWER EXTREMITY: Primary | ICD-10-CM

## 2020-01-31 PROCEDURE — 99422 OL DIG E/M SVC 11-20 MIN: CPT | Performed by: NURSE PRACTITIONER

## 2020-01-31 RX ORDER — PREDNISONE 20 MG/1
40 TABLET ORAL DAILY
Qty: 14 TABLET | Refills: 0 | Status: SHIPPED | OUTPATIENT
Start: 2020-01-31 | End: 2021-01-15

## 2020-01-31 ASSESSMENT — MIFFLIN-ST. JEOR: SCORE: 1204.21

## 2020-01-31 NOTE — PROGRESS NOTES
[facility]  Orthopedics  Luis Akers,   2020     Name: Gerardo Mandujano  MRN: [id number]  Age: 49 year old  : 1970  Referring provider: Referred Self     Chief Complaint: Low back pain     History of Present Illness:   Gerardo Mandujano is a 49 year old female who presents today for evaluation of right low back pain. The patient reports that about three weeks ago she was squatting down, fixing a door and doing a pulling motion with her right hand. Later on she developed pain in her right shoulder, as well as her right low back. She points to the L3 region. She says the back pain is aching and radiates around to her anterior hip, down the lateral right leg, and she has a burning sensation in her groin. The pain causes difficulty sitting or doing strenuous activity. No numbness. The pain is improved with lying flat or with her feet elevated. She's been taking Advil and tylenol, which do provide temporary relief. She has not had any improvement of the pain with time, prompting her presentation here. Of note, about one month ago the patient injured her left foot and has been wearing a boot for this, which is taller than the shoes she wears on the right. The patient also says that she had a similar episode of pain two years ago. She says that during that episode she was evaluated for appendicitis with an abdominal/pelvic CT and pelvic ultrasound which were negative. She was also seen here about a year ago (2018) for a low back strain, and was treated for this with flexeril and a short course of prednisone. She's also been on gabapentin in the past.     Review of Systems:   A 10-point review of systems was obtained and is negative except for as noted in the HPI.     Medications:   Cyclobenzaprine (FLEXERIL) 5 MG tablet, Take 5 mg by mouth 3 times daily as needed for muscle spasms  FERROUS GLUCONATE, Reported on 3/22/2017  Gabapentin (NEURONTIN) 100 MG capsule, Take 1 capsule (100 mg) by mouth 3 times daily  "(not currently taking)  Lisinopril (PRINIVIL/ZESTRIL) 10 MG tablet, Take 1 tablet (10 mg) by mouth daily    Allergies:  Chloraprep one step      Past Medical History:  Seasonal allergies      Past Surgical History:   section -   Laparoscopic supracervical hysterectomy -      Social History:  Patient is a nonsmoker. No smokeless tobacco use.  Drug Use: No       Family History:  Uterine fibroids, hypertension, hyperlipidemia, throat cancer, breast cancer, colorectal cancer, cataract     Physical Examination:  Resp. rate 16, height 1.626 m (5' 4\"), weight 59.4 kg (131 lb), last menstrual period 2012, not currently breastfeeding.  General  - normal appearance, in no obvious distress  CV  - normal peripheral perfusion  Pulm  - normal respiratory pattern, non-labored  Musculoskeletal - lumbar spine  - stance: normal gait without limp, no obvious leg length discrepancy  - inspection: normal bone and joint alignment, no obvious scoliosis  - palpation: tenderness at right sciatic notch of right gluteal region. Tenderness at right greater trochanter. No lumbar paraspinal tenderness to palpation, no quadratus lumborum tenderness.   - ROM: full forward flexion to 80 deg without pain, normal and painless extension, left and right sidebending and rotation  - strength: lower extremities 5/5 in all planes  - special tests:  (-) straight leg raise bilaterally  (-) slump test  Musculoskeletal - Right hip  - stance: normal gait without limp, no obvious leg length discrepancy  - inspection: no swelling or effusion,  normal bone and joint alignment, no obvious deformity  - palpation: tenderness at right sciatic notch of right gluteal region. Tenderness at right greater trochanter.  - ROM: flexion to 90 deg without pain, ER to 65 deg, IR to 20 deg without pain. Painless adduction and abduction.   - strength: 5/5 in all planes  - special tests:  (-) TANJA  (-) FADIR  no pain with axial femoral load  Neuro  - patellar " and Achilles DTRs 2+ bilaterally, negative straight leg raise, no sensory or motor deficit, grossly normal coordination, normal muscle tone  Skin  - no ecchymosis, erythema, warmth, or induration, no obvious rash  Psych  - interactive, appropriate, normal mood and affect     Imaging:   Lumbar Spine. Final results and radiologist's interpretation, available in the [facility] health record. Images were reviewed with the patient/family members in the office today. My personal interpretation of the performed imaging is preserved disc spaces. Mild thickening of the lower lumbar joints, consistent with early arthritis.       Assessment:   49 year old female presenting with acute on chronic, intermittent low back pain with radicular symptoms to her right groin and lateral thigh. I suspect disc pathology causing nerve root impingement. No provocation of pain with hip exam.  After long discussion, she has previously been worked up for her groin pain with pelvic ultrasound and CT abdomen and pelvis. She has yet to have an MRI of her low back and given persistent symptoms over the past 3 years, I do feel this is warranted at this time.    Plan:   -MRI of low back   -Course of prednisone x 5 days  -Heat/ice therapy  -Gentle stretching  -Follow up after MRI    It was a pleasure seeing Gerardo today.    Luis Akers DO, [facility]  Primary Care Sports Medicine     I, Luis Akers DO, have reviewed the above note and agree with the scribe's notation as written.    Scribe Disclosure:  I, Sincere Garzon, am serving as a scribe to document services personally performed by Luis Akers DO at this visit, based upon the provider's statements to me. All documentation has been reviewed by the aforementioned provider prior to being entered into the official medical record.

## 2020-01-31 NOTE — PROGRESS NOTES
SPORTS & ORTHOPEDIC WALK-IN VISIT 1/31/2020    Primary Care Physician: Dr. Goyal     Was squatting down fixing the door, and later on that day, starting having pain going up and down leg and then went into the back and up to the shoulder.   Has had this happen before in November 2018 and May 2019  Reason for visit:     What part of your body is injured / painful?  right low back    What caused the injury /pain? No inciting event     How long ago did your injury occur or pain begin? 3 weeks ago     What are your most bothersome symptoms? Pain    How would you characterize your symptom?  aching, dull, stabbing, sharp, shooting, throbing and burning    What makes your symptoms better? Other: advil and tylenol     What makes your symptoms worse? Sitting    Have you been previously seen for this problem? No    Medical History:    Any recent changes to your medical history? No    Any new medication prescribed since last visit? No    Have you had surgery on this body part before? No    Social History:    Occupation: Not working     Handedness: Left    Exercise:     Review of Systems:    Do you have fever, chills, weight loss? No    Do you have any vision problems? No    Do you have any chest pain or edema? No    Do you have any shortness of breath or wheezing?  No    Do you have stomach problems? No    Do you have any numbness or focal weakness? No    Do you have diabetes? No    Do you have problems with bleeding or clotting? No    Do you have an rashes or other skin lesions? No

## 2020-01-31 NOTE — LETTER
2020       RE: Gerardo Mandujano  [address]  [facility] [address]     Dear Colleague,    Thank you for referring your patient, Gerardo Mandujano, to the [facility] SPORTS AND ORTHOPAEDIC WALK IN CLINIC at [facility]. Please see a copy of my visit note below.          SPORTS & ORTHOPEDIC WALK-IN VISIT 2020    Primary Care Physician: Dr. Goyal     Was squatting down fixing the door, and later on that day, starting having pain going up and down leg and then went into the back and up to the shoulder.   Has had this happen before in 2018 and May 2019  Reason for visit:     What part of your body is injured / painful?  right low back    What caused the injury /pain? No inciting event     How long ago did your injury occur or pain begin? 3 weeks ago     What are your most bothersome symptoms? Pain    How would you characterize your symptom?  aching, dull, stabbing, sharp, shooting, throbing and burning    What makes your symptoms better? Other: advil and tylenol     What makes your symptoms worse? Sitting    Have you been previously seen for this problem? No    Medical History:    Any recent changes to your medical history? No    Any new medication prescribed since last visit? No    Have you had surgery on this body part before? No    Social History:    Occupation: Not working     Handedness: Left    Exercise:     Review of Systems:    Do you have fever, chills, weight loss? No    Do you have any vision problems? No    Do you have any chest pain or edema? No    Do you have any shortness of breath or wheezing?  No    Do you have stomach problems? No    Do you have any numbness or focal weakness? No    Do you have diabetes? No    Do you have problems with bleeding or clotting? No    Do you have an rashes or other skin lesions? No           [facility]  Orthopedics  uLis Akers, DO  2020     Name: Gerardo Mandujano  MRN: [id number]  Age: 49 year old  :  1970  Referring provider: Referred Self     Chief Complaint: Low back pain     History of Present Illness:   Gerardo Mandujano is a 49 year old female who presents today for evaluation of right low back pain. The patient reports that about three weeks ago she was squatting down, fixing a door and doing a pulling motion with her right hand. Later on she developed pain in her right shoulder, as well as her right low back. She points to the L3 region. She says the back pain is aching and radiates around to her anterior hip, down the lateral right leg, and she has a burning sensation in her groin. The pain causes difficulty sitting or doing strenuous activity. No numbness. The pain is improved with lying flat or with her feet elevated. She's been taking Advil and tylenol, which do provide temporary relief. She has not had any improvement of the pain with time, prompting her presentation here. Of note, about one month ago the patient injured her left foot and has been wearing a boot for this, which is taller than the shoes she wears on the right. The patient also says that she had a similar episode of pain two years ago. She says that during that episode she was evaluated for appendicitis with an abdominal/pelvic CT and pelvic ultrasound which were negative. She was also seen here about a year ago (11/2018) for a low back strain, and was treated for this with flexeril and a short course of prednisone. She's also been on gabapentin in the past.     Review of Systems:   A 10-point review of systems was obtained and is negative except for as noted in the HPI.     Medications:   Cyclobenzaprine (FLEXERIL) 5 MG tablet, Take 5 mg by mouth 3 times daily as needed for muscle spasms  FERROUS GLUCONATE, Reported on 3/22/2017  Gabapentin (NEURONTIN) 100 MG capsule, Take 1 capsule (100 mg) by mouth 3 times daily (not currently taking)  Lisinopril (PRINIVIL/ZESTRIL) 10 MG tablet, Take 1 tablet (10 mg) by mouth  "daily    Allergies:  Chloraprep one step      Past Medical History:  Seasonal allergies      Past Surgical History:   section -   Laparoscopic supracervical hysterectomy -      Social History:  Patient is a nonsmoker. No smokeless tobacco use.  Drug Use: No       Family History:  Uterine fibroids, hypertension, hyperlipidemia, throat cancer, breast cancer, colorectal cancer, cataract     Physical Examination:  Resp. rate 16, height 1.626 m (5' 4\"), weight 59.4 kg (131 lb), last menstrual period 2012, not currently breastfeeding.  General  - normal appearance, in no obvious distress  CV  - normal peripheral perfusion  Pulm  - normal respiratory pattern, non-labored  Musculoskeletal - lumbar spine  - stance: normal gait without limp, no obvious leg length discrepancy  - inspection: normal bone and joint alignment, no obvious scoliosis  - palpation: tenderness at right sciatic notch of right gluteal region. Tenderness at right greater trochanter. No lumbar paraspinal tenderness to palpation, no quadratus lumborum tenderness.   - ROM: full forward flexion to 80 deg without pain, normal and painless extension, left and right sidebending and rotation  - strength: lower extremities 5/5 in all planes  - special tests:  (-) straight leg raise bilaterally  (-) slump test  Musculoskeletal - Right hip  - stance: normal gait without limp, no obvious leg length discrepancy  - inspection: no swelling or effusion,  normal bone and joint alignment, no obvious deformity  - palpation: tenderness at right sciatic notch of right gluteal region. Tenderness at right greater trochanter.  - ROM: flexion to 90 deg without pain, ER to 65 deg, IR to 20 deg without pain. Painless adduction and abduction.   - strength: 5/5 in all planes  - special tests:  (-) TANJA  (-) FADIR  no pain with axial femoral load  Neuro  - patellar and Achilles DTRs 2+ bilaterally, negative straight leg raise, no sensory or motor deficit, " grossly normal coordination, normal muscle tone  Skin  - no ecchymosis, erythema, warmth, or induration, no obvious rash  Psych  - interactive, appropriate, normal mood and affect     Imaging:   Lumbar Spine. Final results and radiologist's interpretation, available in the [facility] health record. Images were reviewed with the patient/family members in the office today. My personal interpretation of the performed imaging is preserved disc spaces. Mild thickening of the lower lumbar joints, consistent with early arthritis.       Assessment:   49 year old female presenting with acute on chronic, intermittent low back pain with radicular symptoms to her right groin and lateral thigh. I suspect disc pathology causing nerve root impingement. No provocation of pain with hip exam.  After long discussion, she has previously been worked up for her groin pain with pelvic ultrasound and CT abdomen and pelvis. She has yet to have an MRI of her low back and given persistent symptoms over the past 3 years, I do feel this is warranted at this time.    Plan:   -MRI of low back   -Course of prednisone x 5 days  -Heat/ice therapy  -Gentle stretching  -Follow up after MRI    It was a pleasure seeing Gerardo today.    Luis Akers DO, [facility]  Primary Care Sports Medicine     I, Luis Akers DO, have reviewed the above note and agree with the scribe's notation as written.    Scribe Disclosure:  I, Sincere Garzon, am serving as a scribe to document services personally performed by Luis Akers DO at this visit, based upon the provider's statements to me. All documentation has been reviewed by the aforementioned provider prior to being entered into the official medical record.

## 2020-02-03 DIAGNOSIS — F40.240 CLAUSTROPHOBIA: Primary | ICD-10-CM

## 2020-02-03 RX ORDER — DIAZEPAM 5 MG
10 TABLET ORAL
Qty: 2 TABLET | Refills: 0 | Status: SHIPPED | OUTPATIENT
Start: 2020-02-03 | End: 2020-02-03

## 2020-02-03 RX ORDER — DIAZEPAM 5 MG
10 TABLET ORAL
Qty: 2 TABLET | Refills: 0 | Status: SHIPPED | OUTPATIENT
Start: 2020-02-03 | End: 2020-02-04

## 2020-02-04 ENCOUNTER — ANCILLARY PROCEDURE (OUTPATIENT)
Dept: MRI IMAGING | Facility: CLINIC | Age: 50
End: 2020-02-04
Attending: FAMILY MEDICINE
Payer: COMMERCIAL

## 2020-02-04 DIAGNOSIS — M54.50 LOW BACK PAIN RADIATING TO RIGHT LEG: ICD-10-CM

## 2020-02-04 DIAGNOSIS — M79.604 LOW BACK PAIN RADIATING TO RIGHT LEG: ICD-10-CM

## 2020-02-04 RX ORDER — DIAZEPAM 5 MG
10 TABLET ORAL
Qty: 2 TABLET | Refills: 0 | Status: SHIPPED | OUTPATIENT
Start: 2020-02-04 | End: 2021-01-15

## 2020-02-06 RX ORDER — GABAPENTIN 100 MG/1
300 CAPSULE ORAL 3 TIMES DAILY
Qty: 90 CAPSULE | Refills: 3 | Status: SHIPPED | OUTPATIENT
Start: 2020-02-06 | End: 2023-02-20

## 2020-02-07 ENCOUNTER — TELEPHONE (OUTPATIENT)
Dept: PHYSICAL MEDICINE AND REHAB | Facility: CLINIC | Age: 50
End: 2020-02-07

## 2020-02-07 NOTE — TELEPHONE ENCOUNTER
[facility] Call Center    Phone Message    May a detailed message be left on voicemail: yes     Reason for Call: Other: patient calling with referral for  Lumbar back pain with radiculopathy affecting lower extremity, diagnosis is not on GL for clinic scheduling please review and call patient to discuss thank you.      Action Taken: Message routed to:  [facility] ([facility] PMR    Travel Screening: Not Applicable

## 2020-02-13 ENCOUNTER — MYC MEDICAL ADVICE (OUTPATIENT)
Dept: FAMILY MEDICINE | Facility: CLINIC | Age: 50
End: 2020-02-13

## 2020-02-13 DIAGNOSIS — I10 ESSENTIAL HYPERTENSION: ICD-10-CM

## 2020-02-13 RX ORDER — LISINOPRIL 10 MG/1
10 TABLET ORAL DAILY
Qty: 90 TABLET | Refills: 1 | Status: SHIPPED | OUTPATIENT
Start: 2020-02-13 | End: 2020-08-10

## 2020-02-13 NOTE — TELEPHONE ENCOUNTER
Prescription approved per [facility] Refill Protocol.  Marika Schilling RN   [facility]

## 2020-02-13 NOTE — TELEPHONE ENCOUNTER
Wrote patient reply  on MyChart. Patient looking for non-pharmacological interventions for low back pain. Kristy Ahn RN on 2/13/2020 at 1:30 PM

## 2020-02-13 NOTE — TELEPHONE ENCOUNTER
"Requested Prescriptions   Pending Prescriptions Disp Refills     lisinopril (PRINIVIL/ZESTRIL) 10 MG tablet 90 tablet 1     Sig: Take 1 tablet (10 mg) by mouth daily  Last Written Prescription Date:  08/28/2019  Last Fill Quantity: 90,  # refills: 1   Last office visit: 10/2/2019 with prescribing provider:  10/02/2019   Future Office Visit:   Next 5 appointments (look out 90 days)    Mar 12, 2020  2:45 PM CDT  [facility] with MANUEL Rhoades CNP  [facility] ([facility]) [address] [place] [id number] [phone]              ACE Inhibitors (Including Combos) Protocol Passed - 2/13/2020  1:44 PM        Passed - Blood pressure under 140/90 in past 12 months     BP Readings from Last 3 Encounters:   01/16/20 112/70   10/02/19 110/72   07/15/19 104/62                 Passed - Recent (12 mo) or future (30 days) visit within the authorizing provider's specialty     Patient has had an office visit with the authorizing provider or a provider within the authorizing providers department within the previous 12 mos or has a future within next 30 days. See \"Patient Info\" tab in inbasket, or \"Choose Columns\" in Meds & Orders section of the refill encounter.              Passed - Medication is active on med list        Passed - Patient is age 18 or older        Passed - No active pregnancy on record        Passed - Normal serum creatinine on file in past 12 months     Recent Labs   Lab Test 10/02/19  1605   CR 0.61             Passed - Normal serum potassium on file in past 12 months     Recent Labs   Lab Test 10/02/19  1605   POTASSIUM 3.9             Passed - No positive pregnancy test within past 12 months        "

## 2020-02-13 NOTE — TELEPHONE ENCOUNTER
Provider E-Visit time total (minutes): 15  
Requested Prescriptions   Pending Prescriptions Disp Refills     gabapentin (NEURONTIN) 100 MG capsule 90 capsule 3     Sig: Take 1 capsule (100 mg) by mouth 3 times daily       There is no refill protocol information for this order        Last Written Prescription Date:  4/10/2019  Last Fill Quantity: 90,   # refills: 3    Routing refill request to provider for review/approval because:  Drug not on the [facility] or [facility] refill protocol or controlled substance     CHECKED   LAST FILLED on 04/11/219, 90 capsules, 0/3 refills by FERN Goyal.   
Routing to Provider.     Please read patient FYDOMINICK Hogandarling message.  Kristy Ahn RN on 2/7/2020 at 2:51 PM     
Routing to Provider.     Please read patient MyChart reply. Kristy Ahn RN on 2/3/2020 at 11:41 AM    
Rupa    See pt MyChart message    Alda Rolle, RN   [facility]        
Wrote patient back on [facility] with PMR referral. Kristy Ahn RN on 2/13/2020 at 7:43 AM    
will add S.G.L.T. 2 receptor inhibition therapy, AND g.l.p.-1 agonist therapy as an outpatient, as these medications require insurance approval. please add ezetimibe 10 mg. daily. I again underlined the importance of compliance, and how seeing an endocrinologist on a regular basis acan improve his quality of life, and less heart disease.

## 2020-02-14 NOTE — TELEPHONE ENCOUNTER
VM left for the patient asking for a return call to schedule an appointment if still interested.    She can be scheduled with Dr. Dennis as a new patient.

## 2020-02-21 ENCOUNTER — MYC MEDICAL ADVICE (OUTPATIENT)
Dept: FAMILY MEDICINE | Facility: CLINIC | Age: 50
End: 2020-02-21

## 2020-02-21 NOTE — TELEPHONE ENCOUNTER
Wrote reply on MyChart, stating that referral is not needed most likely if patient was allowed to make the appointment. Gave patient referral specialist number to direct referral questions there. rKisty Ahn RN on 2/21/2020 at 10:56 AM

## 2020-03-02 ENCOUNTER — THERAPY VISIT (OUTPATIENT)
Dept: PHYSICAL THERAPY | Facility: CLINIC | Age: 50
End: 2020-03-02
Payer: COMMERCIAL

## 2020-03-02 DIAGNOSIS — M54.50 ACUTE RIGHT-SIDED LOW BACK PAIN WITHOUT SCIATICA: ICD-10-CM

## 2020-03-02 PROCEDURE — 97110 THERAPEUTIC EXERCISES: CPT | Mod: GP | Performed by: PHYSICAL THERAPIST

## 2020-03-02 PROCEDURE — 97530 THERAPEUTIC ACTIVITIES: CPT | Mod: GP | Performed by: PHYSICAL THERAPIST

## 2020-03-02 PROCEDURE — 97161 PT EVAL LOW COMPLEX 20 MIN: CPT | Mod: GP | Performed by: PHYSICAL THERAPIST

## 2020-03-02 NOTE — PROGRESS NOTES
[facility] Initial Evaluation  Subjective:  The history is provided by the patient.   Patient Health History  Gerardo Mandujano being seen for low back/R leg pain.     Date of Onset: Jan 2020.   Problem occurred: squatting/bending/pulling   Pain score: 6-7/10 at worst.  General health as reported by patient is good.                                       Therapist Generated HPI Evaluation  Problem details: Jan 2020- pt reports onset of R LBP and R leg pain. The patient reports that she was squatting down, fixing a door and doing a pulling motion with her right hand. Pain is low back, right anterior/lat hip.  Was given prednisone pack by MD on 1/13/20.   Prior to the recent issue, was having to wear a boot on right foot d/t stress fracture and this may have thrown things off to start.   Has been seen by this therapist for previous low back issues as well, but prior to onset things had been doing really well. Was seeing chiropractor more for maintenance. .         Type of problem:  Lumbar.    This is a recurrent condition.  Condition occurred with:  Other reason (squatting/bending/lifting).  Where condition occurred: at home.  Patient reports pain:  Lumbar spine right.  Pain is described as aching and is intermittent.  Radiates to: lateral hip right, mild in right groin. has had occasions when it went into leg but this has improved. Pain is the same all the time.  Since onset symptoms are gradually improving.  Associated with: mild tightness/strength/endurance d/t reduced activity. denies N/T. Symptoms are exacerbated by bending, other and sitting (leaning over for ADLs)  and relieved by other and analgesics (stretching).  Special tests included:  X-ray and MRI (negative).  Previous treatment includes physical therapy and chiropractic (PT for previous low back issues). There was moderate improvement following previous treatment.        Physical Exam                     Objective:        Flexibility/Screens:   Positive screens:  Lumbar                   Lumbar/SI Evaluation  ROM:    AROM Lumbar:   Flexion:          Fingers to floor - NE  Ext:                    80% - NE   Side Bend:        Left:  80% - NE    Right:  WNL - NE  Rotation:           Left:     Right:   Side Glide:        Left:     Right:           Lumbar Myotomes:  not assessed (denies radicular sx)                Lumbar Dermtomes:  not assessed (denies radicular sx)                                                            Hip Evaluation  Hip PROM:  : hamstring flexibility WNL bilat.                          Hip Strength:      Extension:  Left: 5-/5  Pain:Right: 4+/5    -  Pain:    Abduction:  Left: 5-/5     Pain:Right: 4+/5   -   Pain:                                   Marilynn Lumbar Evaluation    Posture:  Sitting: poor  Standing: fair            Test Movements:    EIS: During: no effect  After: no effect    Repeat EIS: During: no effect  After: no effect  Mechanical Response: no effect    EIL: During: no effect  After: no effect    Repeat EIL: During: no effect  After: better  Mechanical Response: IncROM      Static Tests:          Lying Prone in Extension: decr pain versus sitting - centralized out of lateral hip    Conclusion: derangement  Principle of Treatment:      Extension: REIL    Other:  awareness                                       ROS    Assessment/Plan:    Patient is a 49 year old female with lumbar complaints.    Patient has the following significant findings with corresponding treatment plan.                Diagnosis 1:  Right low back pain    Pain -  manual therapy, self management, education, directional preference exercise and home program  Decreased ROM/flexibility - manual therapy, therapeutic exercise and home program  Decreased strength - therapeutic exercise, therapeutic activities and home program  Decreased function - therapeutic activities and home program  Impaired  posture - neuro re-education, therapeutic activities and home program    Therapy Evaluation Codes:   1) History comprised of:   Personal factors that impact the plan of care:      None.    Comorbidity factors that impact the plan of care are:      None.     Medications impacting care: Pain.  2) Examination of Body Systems comprised of:   Body structures and functions that impact the plan of care:      Lumbar spine.   Activity limitations that impact the plan of care are:      Bending and Sitting.  3) Clinical presentation characteristics are:   Stable/Uncomplicated.  4) Decision-Making    Low complexity using standardized patient assessment instrument and/or measureable assessment of functional outcome.  Cumulative Therapy Evaluation is: Low complexity.    Previous and current functional limitations:  (See Goal Flow Sheet for this information)    Short term and Long term goals: (See Goal Flow Sheet for this information)     Communication ability:  Patient appears to be able to clearly communicate and understand verbal and written communication and follow directions correctly.  Treatment Explanation - The following has been discussed with the patient:   RX ordered/plan of care  Anticipated outcomes  Possible risks and side effects  This patient would benefit from PT intervention to resume normal activities.   Rehab potential is good.    Frequency:  2 X a month, once daily  Duration:  for 2 months  Discharge Plan:  Achieve all LTG.  Independent in home treatment program.  Reach maximal therapeutic benefit.    Please refer to the daily flowsheet for treatment today, total treatment time and time spent performing 1:1 timed codes.

## 2020-03-16 ENCOUNTER — MYC MEDICAL ADVICE (OUTPATIENT)
Dept: FAMILY MEDICINE | Facility: CLINIC | Age: 50
End: 2020-03-16

## 2020-03-17 NOTE — TELEPHONE ENCOUNTER
My chart message sent to patient.    Marika Schilling RN   [facility]

## 2020-03-26 ENCOUNTER — VIRTUAL VISIT (OUTPATIENT)
Dept: FAMILY MEDICINE | Facility: OTHER | Age: 50
End: 2020-03-26

## 2020-03-26 NOTE — PROGRESS NOTES
"Date: 2020 12:53:21  Clinician: Concepcion Leiva  Clinician NPI: [id number]  Patient: Gerardo Mandujano  Patient : 1970  Patient Address: [address] [place] [id number]  Patient Phone: [phone]  Visit Protocol: URI  Patient Summary:  Gerardo is a 49 year old ( : 1970 ) female who initiated a Visit for COVID-19 (Coronavirus) evaluation and screening. When asked the question \"Please sign me up to receive news, health information and promotions from [employer].\", Gerardo responded \"Yes\".    Gerardo states her symptoms started 1-2 days ago.   Her symptoms consist of a cough and malaise.   Symptom details   Cough: Gerardo coughs a few times an hour and her cough is not more bothersome at night. Phlegm does not come into her throat when she coughs. She does not believe her cough is caused by post-nasal drip.    Gerardo denies having ear pain, rhinitis, wheezing, sore throat, nasal congestion, enlarged lymph nodes, teeth pain, headache, fever, facial pain or pressure, myalgias, and chills. She also denies taking antibiotic medication for the symptoms and having recent facial or sinus surgery in the past 60 days. She is not experiencing dyspnea.   Precipitating events  She has recently been exposed to someone with influenza. Gerardo has been in close contact with the following high risk individuals: people with asthma, heart disease or diabetes.   Pertinent COVID-19 (Coronavirus) information  Gerardo has not traveled internationally or to the areas where COVID-19 (Coronavirus) is widespread, including cruise ship travel in the last 14 days before the start of her symptoms.   Gerardo has not had a close contact with a laboratory-confirmed COVID-19 patient within 14 days of symptom onset. She also has not had a close contact with a suspected COVID-19 patient within 14 days of symptom onset.   Gerardo is not a healthcare worker or a  and does not work in a healthcare facility. She does not " live with a healthcare worker.   Pertinent medical history  Gerardo does not get yeast infections when she takes antibiotics.   Gerardo does not need a return to work/school note.   Weight: 125 lbs   Gerardo does not smoke or use smokeless tobacco.   She denies pregnancy and denies breastfeeding. She does not menstruate.   Weight: 125 lbs    MEDICATIONS: lisinopril oral, ALLERGIES: NKDA  Clinician Response:  Dear Gerardo,   Based on the information you have provided, you do have symptoms that are consistent with Coronavirus (COVID-19).  The coronavirus causes mild to severe respiratory illness with the most common symptoms including fever, cough and difficulty breathing. Unfortunately, many viruses cause similar symptoms and it can be difficult to distinguish between viruses, especially in mild cases, so we are presuming that anyone with cough or fever has coronavirus at this time.  Coronavirus/COVID-19 has reached the point of community spread in [place], meaning that we are finding the virus in people with no known exposure risk for lesly the virus. Given the increasing commonness of coronavirus in the community we are no longer testing patients who are not critically ill.  If you are a health care worker, you should refer to your employee health office for instructions about testing and returning to work.  For everyone else who has cough or fever, you should assume you are infected with coronavirus. Since you will not be tested but have symptoms that may be consistent with coronavirus, the CDC recommends you stay in self-isolation until these three things have happened:    You have had no fever for at least 72 hours (that is three full days of no fever without the use of medicine that reduces fevers)    AND   Other symptoms have improved (for example, when your cough or shortness of breath have improved)   AND   At least 7 days have passed since your symptoms first appeared.   How to Isolate:   Isolate  yourself at home.  Do Not allow any visitors  Do Not go to work or school  Do Not go to Confucianist,  centers, shopping, or other public places.  Do Not shake hands.  Avoid close contact with others (hugging, kissing).   Protect Others:   Cover Your Mouth and Nose with a mask, disposable tissue or wash cloth to avoid spreading germs to others.  Wash your hands and face frequently with soap and water.   We know it can be scary to hear that you might have COVID-19. Our team can help track your symptoms and make sure you are doing ok over the next two weeks using a program called [employer] to keep in touch. When you receive an email from [employer], please consider enrolling in our monitoring program. There is no cost to you for monitoring. Here is a URL where you can learn more: [email or web address]  Managing Symptoms:   At this time, we primarily recommend Tylenol (Acetaminophen) for fever or pain. If you have liver or kidney problems, contact your primary care provider for instructions on use of tylenol. Adults can take 650 mg (two 325 mg pills) by mouth every 4-6 hours as needed OR 1,000 mg (two 500 mg pills) every 8 hours as needed. MAXIMUM DAILY DOSE: 3,000mg. For children, refer to dosing on bottle based on age or weight.   If you develop significant shortness of breath that prevents you from doing normal activities, please call 911 or proceed to the nearest emergency room and alert them immediately that you have been in self-isolation for possible coronavirus.  For more information about COVID19 and options for caring for yourself at home, please visit the CDC website at https://www.cdc.gov/coronavirus/2019-ncov/about/steps-when-sick.htmlFor more options for care at [facility], please visit our website at [email or web address]    Diagnosis: Cough  Diagnosis ICD: R05

## 2020-04-14 PROBLEM — M54.50 ACUTE RIGHT-SIDED LOW BACK PAIN WITHOUT SCIATICA: Status: RESOLVED | Noted: 2020-03-02 | Resolved: 2020-04-14

## 2020-04-14 NOTE — PROGRESS NOTES
Patient seen one time for evaluation and treatment on Mar 2, 2020.  Patient did not return for further treatment to complete their physical therapy plan of care, and current status is unknown.  Please see initial evaluation dated Mar 2, 2020  for further information/discharge information.   Discharge patient from PT at this time.

## 2020-06-17 ENCOUNTER — MYC MEDICAL ADVICE (OUTPATIENT)
Dept: FAMILY MEDICINE | Facility: CLINIC | Age: 50
End: 2020-06-17

## 2020-06-17 DIAGNOSIS — Z11.59 SCREENING FOR VIRAL DISEASE: Primary | ICD-10-CM
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
## 2020-06-23 DIAGNOSIS — Z11.59 SCREENING FOR VIRAL DISEASE: ICD-10-CM

## 2020-06-23 PROCEDURE — 86769 SARS-COV-2 COVID-19 ANTIBODY: CPT | Performed by: FAMILY MEDICINE

## 2020-06-23 PROCEDURE — 36415 COLL VENOUS BLD VENIPUNCTURE: CPT | Performed by: FAMILY MEDICINE

## 2020-06-23 NOTE — LETTER
June 29, 2020        Gerardo Mandujano  [address]  [place] [address]          COVID-19 Antibody, IgG   Date Value Ref Range Status   06/23/2020 Negative NEG^Negative Final     Comment:     Negative results do not rule out SARS-CoV-2 infection, particularly in those   who have been in contact with the virus.  Follow-up testing with a molecular   diagnostic should be considered to rule out infection in these individuals.  Results from antibody testing should not be used as the sole basis to diagnose   or exclude SARS-CoV-2 infection or to inform infection status.           You have tested NEGATIVE for COVID-19 antibodies. This suggests you have not had or been exposed to COVID-19. But it does not mean that for sure.     The test finds antibodies in most people 10 days after they get sick. For some people, it takes longer than 10 days for antibodies to show up. Others may never show antibodies against COVID-19, especially if they have weak immune systems.    If you have COVID-19 symptoms now, please stay home and away from others.     What is antibody testing?    This is a kind of blood test. We take a small sample of your blood, and then test it for something called  antibodies.      Your body makes antibodies to fight infection. If your blood has antibodies for a certain germ, it means you ve been infected with that germ in the past.     Sometimes, antibodies stay in your body for years after you ve had the infection. They can be there even if the germ didn t make you sick. They are a sign that your body fought off the infection.    Will this test find antibodies in everyone who s had COVID-19?    No. The test finds antibodies in most people 10 days after they get sick. For some people, it takes longer than 10 days for antibodies to show up. Others may never show antibodies against COVID-19, especially if they have weak immune systems.    What does it mean if the test finds COVID-19 antibodies?    If we  find these antibodies, it suggests:     This person has had the virus.     Their body s immune system fought the virus.     We don t know if this will help protect someone from getting COVID-19 again. Scientists are still learning about this.    What are the signs of COVID-19?    Signs of COVID-19 can appear from 2 to 14 days (up to 2 weeks) after you re infected. Some people have no symptoms or only mild symptoms. Others get very sick. The most common symptoms are:          Cough    Shortness of breath or trouble breathing  Or at least 2 of these symptoms:    Fever    Chills    Repeated shaking with chills    Muscle pain    Headache    Sore throat    Losing your sense of taste or smell    You may have other symptoms. Please contact your doctor or clinic for any symptoms that worry you.    Where can I get more information?     To learn the [facility] guidelines for staying home, please visit the [place] Department of Health website at [email or web address]    To learn more about COVID-19 and how to care for yourself at home, please visit the CDC website at https://www.cdc.gov/coronavirus/2019-ncov/about/steps-when-sick.html    For more options for care at [facility], please visit our website at [email or web address]    [facility] ([facility] COVID-19 Hotline:  [phone]

## 2020-06-24 LAB
COVID-19 ANTIBODY IGG: NEGATIVE
LAB TEST METHOD: NORMAL

## 2020-06-26 NOTE — RESULT ENCOUNTER NOTE
Gerardo,    Your covid antibody test was negative.  If you have any questions, please feel free to contact the clinic.    MELANIE Linton

## 2020-08-10 DIAGNOSIS — I10 ESSENTIAL HYPERTENSION: ICD-10-CM

## 2020-08-10 RX ORDER — LISINOPRIL 10 MG/1
10 TABLET ORAL DAILY
Qty: 90 TABLET | Refills: 1 | Status: SHIPPED | OUTPATIENT
Start: 2020-08-10 | End: 2021-01-21

## 2020-08-11 NOTE — TELEPHONE ENCOUNTER
"Requested Prescriptions   Pending Prescriptions Disp Refills     lisinopril (ZESTRIL) 10 MG tablet 90 tablet 1     Sig: Take 1 tablet (10 mg) by mouth daily       ACE Inhibitors (Including Combos) Protocol Passed - 8/10/2020  4:46 PM        Passed - Blood pressure under 140/90 in past 12 months     BP Readings from Last 3 Encounters:   01/16/20 112/70   10/02/19 110/72   07/15/19 104/62                 Passed - Recent (12 mo) or future (30 days) visit within the authorizing provider's specialty     Patient has had an office visit with the authorizing provider or a provider within the authorizing providers department within the previous 12 mos or has a future within next 30 days. See \"Patient Info\" tab in inbasket, or \"Choose Columns\" in Meds & Orders section of the refill encounter.              Passed - Medication is active on med list        Passed - Patient is age 18 or older        Passed - No active pregnancy on record        Passed - Normal serum creatinine on file in past 12 months     Recent Labs   Lab Test 10/02/19  1605   CR 0.61       Ok to refill medication if creatinine is low          Passed - Normal serum potassium on file in past 12 months     Recent Labs   Lab Test 10/02/19  1605   POTASSIUM 3.9             Passed - No positive pregnancy test within past 12 months           Signed Prescriptions:                        Disp   Refills    lisinopril (ZESTRIL) 10 MG tablet          90 tab*1        Sig: Take 1 tablet (10 mg) by mouth daily  Authorizing Provider: LEIDA FRIEDMAN  Ordering User: ANGELA DOWD      "

## 2020-11-10 DIAGNOSIS — Z12.31 ENCOUNTER FOR SCREENING MAMMOGRAM FOR BREAST CANCER: ICD-10-CM

## 2020-11-10 PROCEDURE — 77067 SCR MAMMO BI INCL CAD: CPT | Mod: 26 | Performed by: STUDENT IN AN ORGANIZED HEALTH CARE EDUCATION/TRAINING PROGRAM

## 2020-11-10 PROCEDURE — 77067 SCR MAMMO BI INCL CAD: CPT

## 2020-11-16 ENCOUNTER — HEALTH MAINTENANCE LETTER (OUTPATIENT)
Age: 50
End: 2020-11-16

## 2021-01-13 ENCOUNTER — MYC MEDICAL ADVICE (OUTPATIENT)
Dept: FAMILY MEDICINE | Facility: CLINIC | Age: 51
End: 2021-01-13

## 2021-01-15 ENCOUNTER — OFFICE VISIT (OUTPATIENT)
Dept: FAMILY MEDICINE | Facility: CLINIC | Age: 51
End: 2021-01-15
Payer: COMMERCIAL

## 2021-01-15 VITALS
WEIGHT: 136.5 LBS | BODY MASS INDEX: 22.74 KG/M2 | HEIGHT: 65 IN | DIASTOLIC BLOOD PRESSURE: 80 MMHG | OXYGEN SATURATION: 99 % | TEMPERATURE: 98.2 F | SYSTOLIC BLOOD PRESSURE: 100 MMHG | HEART RATE: 70 BPM

## 2021-01-15 DIAGNOSIS — Z00.00 ROUTINE GENERAL MEDICAL EXAMINATION AT A HEALTH CARE FACILITY: Primary | ICD-10-CM

## 2021-01-15 DIAGNOSIS — I10 BENIGN ESSENTIAL HYPERTENSION: ICD-10-CM

## 2021-01-15 DIAGNOSIS — L60.3 DYSTROPHIC NAIL: ICD-10-CM

## 2021-01-15 DIAGNOSIS — Z23 NEED FOR SHINGLES VACCINE: ICD-10-CM

## 2021-01-15 DIAGNOSIS — Z23 NEED FOR DIPHTHERIA-TETANUS-PERTUSSIS (TDAP) VACCINE: ICD-10-CM

## 2021-01-15 DIAGNOSIS — Z12.4 SCREENING FOR MALIGNANT NEOPLASM OF CERVIX: ICD-10-CM

## 2021-01-15 LAB
CREAT UR-MCNC: 94 MG/DL
MICROALBUMIN UR-MCNC: 6 MG/L
MICROALBUMIN/CREAT UR: 6.72 MG/G CR (ref 0–25)

## 2021-01-15 PROCEDURE — 99396 PREV VISIT EST AGE 40-64: CPT | Mod: 25 | Performed by: NURSE PRACTITIONER

## 2021-01-15 PROCEDURE — 82043 UR ALBUMIN QUANTITATIVE: CPT | Performed by: NURSE PRACTITIONER

## 2021-01-15 PROCEDURE — 90715 TDAP VACCINE 7 YRS/> IM: CPT | Performed by: NURSE PRACTITIONER

## 2021-01-15 PROCEDURE — 36415 COLL VENOUS BLD VENIPUNCTURE: CPT | Performed by: NURSE PRACTITIONER

## 2021-01-15 PROCEDURE — 90472 IMMUNIZATION ADMIN EACH ADD: CPT | Performed by: NURSE PRACTITIONER

## 2021-01-15 PROCEDURE — G0145 SCR C/V CYTO,THINLAYER,RESCR: HCPCS | Performed by: NURSE PRACTITIONER

## 2021-01-15 PROCEDURE — 80061 LIPID PANEL: CPT | Performed by: NURSE PRACTITIONER

## 2021-01-15 PROCEDURE — 87101 SKIN FUNGI CULTURE: CPT | Performed by: NURSE PRACTITIONER

## 2021-01-15 PROCEDURE — 99213 OFFICE O/P EST LOW 20 MIN: CPT | Mod: 25 | Performed by: NURSE PRACTITIONER

## 2021-01-15 PROCEDURE — 90471 IMMUNIZATION ADMIN: CPT | Performed by: NURSE PRACTITIONER

## 2021-01-15 PROCEDURE — 80053 COMPREHEN METABOLIC PANEL: CPT | Performed by: NURSE PRACTITIONER

## 2021-01-15 PROCEDURE — 87624 HPV HI-RISK TYP POOLED RSLT: CPT | Performed by: NURSE PRACTITIONER

## 2021-01-15 PROCEDURE — 90750 HZV VACC RECOMBINANT IM: CPT | Performed by: NURSE PRACTITIONER

## 2021-01-15 ASSESSMENT — MIFFLIN-ST. JEOR: SCORE: 1236.07

## 2021-01-15 NOTE — PROGRESS NOTES
SUBJECTIVE:   CC: Gerardo Mandujano is an 50 year old woman who presents for preventive health visit.     Patient has been advised of split billing requirements and indicates understanding: Yes  Healthy Habits:    Do you get at least three servings of calcium containing foods daily (dairy, green leafy vegetables, etc.)? no, taking calcium and/or vitamin D supplement: yes - Calcium and Vitamin D    Amount of exercise or daily activities, outside of work: 6-7 day(s) per week    Problems taking medications regularly No    Medication side effects: No    Have you had an eye exam in the past two years? yes    Do you see a dentist twice per year? yes    Do you have sleep apnea, excessive snoring or daytime drowsiness?no    Partner working from home since March.  Nehemiah (13 years old) was doing school at home prior to covid pandemic and patient continues to be home with him.  He has had a hard time with not seeing friends.  Gerardo went back to school and taking classes for accounting.  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  Hoping to take a non-profit specific class this summer and then would be done.    Fungus on toes and nail is thickening and shoes are not fitting as well.    HTN - home blood pressures around 110/70's.  Physically in better shape than a long time.  Only one episode of back pain.  Regularly physical active with virtual workouts.  BP Readings from Last 6 Encounters:   01/15/21 100/80   01/16/20 112/70   10/02/19 110/72   07/15/19 104/62   04/10/19 138/80   03/06/19 (!) 142/92     Wt Readings from Last 5 Encounters:   01/15/21 61.9 kg (136 lb 8 oz)   01/31/20 59.4 kg (131 lb)   01/16/20 59 kg (130 lb)   12/30/19 59 kg (130 lb)   10/02/19 57.2 kg (126 lb)     Today's PHQ-2 Score:   PHQ-2 ( 1999 Pfizer) 1/15/2021 1/31/2020   Q1: Little interest or pleasure in doing things 0 0   Q2: Feeling down, depressed or hopeless 0 0   PHQ-2 Score 0 0   Q1: Little interest or pleasure in doing things - -    Q2: Feeling down, depressed or hopeless - -   PHQ-2 Score - -     Abuse: Current or Past(Physical, Sexual or Emotional)- No  Do you feel safe in your environment? Yes    Social History     Tobacco Use     Smoking status: Never Smoker     Smokeless tobacco: Never Used   Substance Use Topics     Alcohol use: Yes     Comment: 0-1 drink per week     If you drink alcohol do you typically have >3 drinks per day or >7 drinks per week? No                     Reviewed orders with patient.  Reviewed health maintenance and updated orders accordingly - Yes  Lab work is in process  Labs reviewed in [facility]    Mammogram Screening: Patient over age 50, mutual decision to screen reflected in health maintenance.    Pertinent mammograms are reviewed under the imaging tab.  History of abnormal Pap smear: NO - age 30-65 PAP every 5 years with negative HPV co-testing recommended  PAP / HPV Latest Ref Rng & Units 10/2/2015 3/4/2013 1/19/2010   PAP - NIL NIL NIL   HPV 16 DNA NEG Negative - -   HPV 18 DNA NEG Negative - -   OTHER HR HPV NEG Negative - -     Reviewed and updated as needed this visit by clinical staff  Tobacco  Allergies  Meds   Med Hx  Surg Hx  Fam Hx  Soc Hx        Reviewed and updated as needed this visit by Provider                  ROS:  CONSTITUTIONAL: NEGATIVE for fever, chills, change in weight  INTEGUMENTARY/SKIN: NEGATIVE for worrisome rashes, moles or lesions  EYES: NEGATIVE for vision changes or irritation  ENT: NEGATIVE for ear, mouth and throat problems  RESP: NEGATIVE for significant cough or SOB  BREAST: NEGATIVE for masses, tenderness or discharge  CV: NEGATIVE for chest pain, palpitations or peripheral edema  GI: NEGATIVE for nausea, abdominal pain, heartburn, or change in bowel habits  : NEGATIVE for unusual urinary or vaginal symptoms. No vaginal bleeding.  MUSCULOSKELETAL: NEGATIVE for significant arthralgias or myalgia  NEURO: NEGATIVE for weakness, dizziness or paresthesias  PSYCHIATRIC:  "NEGATIVE for changes in mood or affect     OBJECTIVE:   /80   Pulse 70   Temp 98.2  F (36.8  C) (Oral)   Ht 1.645 m (5' 4.75\")   Wt 61.9 kg (136 lb 8 oz)   LMP 11/05/2012   SpO2 99%   BMI 22.89 kg/m    EXAM:  GENERAL: healthy, alert and no distress  EYES: Eyes grossly normal to inspection, PERRL and conjunctivae and sclerae normal  HENT: ear canals and TM's normal, nose and mouth without ulcers or lesions  NECK: no adenopathy, no asymmetry, masses, or scars and thyroid normal to palpation  RESP: lungs clear to auscultation - no rales, rhonchi or wheezes  BREAST: normal without masses, tenderness or nipple discharge and no palpable axillary masses or adenopathy  CV: regular rate and rhythm, normal S1 S2, no S3 or S4, no murmur, click or rub, no peripheral edema and peripheral pulses strong  ABDOMEN: soft, nontender, no hepatosplenomegaly, no masses and bowel sounds normal   (female): normal female external genitalia, normal urethral meatus, vaginal mucosa pink, moist, well rugated, and normal cervix/adnexa/uterus without masses or discharge  MS: no gross musculoskeletal defects noted, no edema; 3rd and fourth toes of each foot have thickened toenails  SKIN: no suspicious lesions or rashes  NEURO: Normal strength and tone, mentation intact and speech normal  PSYCH: mentation appears normal, affect normal/bright  LYMPH: no cervical, supraclavicular, axillary, or inguinal adenopathy    Diagnostic Test Results:  Labs reviewed in Epic  Results for orders placed or performed in visit on 01/15/21   Pap imaged thin layer screen with HPV - recommended age 30 - 65     Status: None   Result Value Ref Range    PAP NIL     Copath Report         Patient Name: JOSÉ MIGUEL PAZ  MR#: [id number]  Specimen #: [id number]  Collected: 1/15/2021  Received: 1/18/2021  Reported: 1/19/2021 16:12  Ordering Phy(s): LEIDA FRIEDMAN    For improved result formatting, select 'View Enhanced Report Format' under   Linked Documents " section.    SPECIMEN/STAIN PROCESS:  Pap imaged thin layer prep screening (Surepath, FocalPoint with guided   screening)       Pap-Cyto x 1, HPV ordered x 1    SOURCE: Cervical, endocervical  ----------------------------------------------------------------   Pap imaged thin layer prep screening (Surepath, FocalPoint with guided   screening)  SPECIMEN ADEQUACY:  Satisfactory for evaluation.  -Transformation zone component present.    CYTOLOGIC INTERPRETATION:    Negative for intraepithelial lesion or malignancy    Electronically signed out by:  George PARK, (ASC)    CLINICAL HISTORY:  LMP: 11/5/12  A previous normal pap  Date of Last Pap: 10/2/15,    Papanicolaou Test Limitations:  Cervical cytology is a  screening test with   limited sensitivity; regular  screening is critical for cancer prevention; Pap tests are primarily   effective for the diagnosis/prevention of  squamous cell carcinoma, not adenocarcinomas or other cancers.    The technical component of this testing was completed at the [facility], with the professional component performed   at the [facility], [address] [place] [id number] ([phone])    COLLECTION SITE:  Client:  [facility]  Location: [facility] (B)       HPV High Risk Types DNA Cervical     Status: None   Result Value Ref Range    HPV Source SurePath     HPV 16 DNA Negative NEG^Negative    HPV 18 DNA Negative NEG^Negative    Other HR HPV Negative NEG^Negative    Final Diagnosis This patient's sample is negative for HPV DNA.     Specimen Description Cervical Cells    Comprehensive metabolic panel     Status: Abnormal   Result Value Ref Range    Sodium 141 133 - 144 mmol/L    Potassium 4.0 3.4 - 5.3 mmol/L    Chloride 112 (H) 94 - 109 mmol/L    Carbon Dioxide 25 20 - 32 mmol/L    Anion Gap 4 3 - 14 mmol/L     Glucose 82 70 - 99 mg/dL    Urea Nitrogen 12 7 - 30 mg/dL    Creatinine 0.60 0.52 - 1.04 mg/dL    GFR Estimate >90 >60 mL/min/[1.73_m2]    GFR Estimate If Black >90 >60 mL/min/[1.73_m2]    Calcium 8.6 8.5 - 10.1 mg/dL    Bilirubin Total 0.4 0.2 - 1.3 mg/dL    Albumin 3.8 3.4 - 5.0 g/dL    Protein Total 7.3 6.8 - 8.8 g/dL    Alkaline Phosphatase 108 40 - 150 U/L    ALT 26 0 - 50 U/L    AST 18 0 - 45 U/L   Albumin Random Urine Quantitative with Creat Ratio     Status: None   Result Value Ref Range    Creatinine Urine 94 mg/dL    Albumin Urine mg/L 6 mg/L    Albumin Urine mg/g Cr 6.72 0 - 25 mg/g Cr   Lipid panel reflex to direct LDL Fasting     Status: Abnormal   Result Value Ref Range    Cholesterol 202 (H) <200 mg/dL    Triglycerides 77 <150 mg/dL    HDL Cholesterol 67 >49 mg/dL    LDL Cholesterol Calculated 120 (H) <100 mg/dL    Non HDL Cholesterol 135 (H) <130 mg/dL   Fungus Culture,  skin, hair, or nail     Status: None (Preliminary result)    Specimen: Toenail    Left Foot   Result Value Ref Range    Specimen Description Toenail Left Foot     Culture Micro No growth after 4 days        ASSESSMENT/PLAN:   (Z00.00) Routine general medical examination at a health care facility  (primary encounter diagnosis)  Comment:   Plan:     (Z12.4) Screening for malignant neoplasm of cervix  Comment:   Plan: Pap imaged thin layer screen with HPV -         recommended age 30 - 65, HPV High Risk Types         DNA Cervical            (I10) Benign essential hypertension  Comment:   Plan: Comprehensive metabolic panel, Albumin Random         Urine Quantitative with Creat Ratio, Lipid         panel reflex to direct LDL Fasting  Blood pressure has been consistently <120/80 and patient is very physically active now compared to when starting the medication.  Ok to trial off the lisinopril.  Discussed trial off and BP monitoring as well as blood pressure threshold at which to restart.    (Z23) Need for shingles vaccine  Comment:   Plan:  "ZOSTER VACCINE RECOMBINANT ADJUVANTED IM NJX            (Z23) Need for diphtheria-tetanus-pertussis (Tdap) vaccine  Comment:   Plan: TDAP VACCINE (Adacel, Boostrix)  [[id number]]            (L60.3) Dystrophic nail  Comment:   Plan: Fungus Culture,  skin, hair, or nail              Patient has been advised of split billing requirements and indicates understanding: Yes  COUNSELING:   Reviewed preventive health counseling, as reflected in patient instructions    Estimated body mass index is 22.89 kg/m  as calculated from the following:    Height as of this encounter: 1.645 m (5' 4.75\").    Weight as of this encounter: 61.9 kg (136 lb 8 oz).        She reports that she has never smoked. She has never used smokeless tobacco.      Counseling Resources:  ATP IV Guidelines  Pooled Cohorts Equation Calculator  Breast Cancer Risk Calculator  BRCA-Related Cancer Risk Assessment: FHS-7 Tool  FRAX Risk Assessment  ICSI Preventive Guidelines  Dietary Guidelines for Americans, 2010  USDA's MyPlate  ASA Prophylaxis  Lung CA Screening    MANUEL Lopez [facility]  "

## 2021-01-15 NOTE — PATIENT INSTRUCTIONS
Stop the lisinopril and monitor blood pressure daily or every other day for two weeks  Restart If blood pressures go above either 135 or above 85  After the two weeks, check blood pressure at least once a week but increase to every other day if you higher blood pressure    COVID tests via [facility] and at home saliva testing    Preventive Health Recommendations  Female Ages 50 - 64    Yearly exam: See your health care provider every year in order to  o Review health changes.   o Discuss preventive care.    o Review your medicines if your doctor has prescribed any.      Get a Pap test every three years (unless you have an abnormal result and your provider advises testing more often).    If you get Pap tests with HPV test, you only need to test every 5 years, unless you have an abnormal result.     You do not need a Pap test if your uterus was removed (hysterectomy) and you have not had cancer.    You should be tested each year for STDs (sexually transmitted diseases) if you're at risk.     Have a mammogram every 1 to 2 years.    Have a colonoscopy at age 50, or have a yearly FIT test (stool test). These exams screen for colon cancer.      Have a cholesterol test every 5 years, or more often if advised.    Have a diabetes test (fasting glucose) every three years. If you are at risk for diabetes, you should have this test more often.     If you are at risk for osteoporosis (brittle bone disease), think about having a bone density scan (DEXA).    Shots: Get a flu shot each year. Get a tetanus shot every 10 years.    Nutrition:     Eat at least 5 servings of fruits and vegetables each day.    Eat whole-grain bread, whole-wheat pasta and brown rice instead of white grains and rice.    Get adequate Calcium and Vitamin D.     Lifestyle    Exercise at least 150 minutes a week (30 minutes a day, 5 days a week). This will help you control your weight and prevent disease.    Limit alcohol to one drink per day.    No  smoking.     Wear sunscreen to prevent skin cancer.     See your dentist every six months for an exam and cleaning.    See your eye doctor every 1 to 2 years.

## 2021-01-16 LAB
ALBUMIN SERPL-MCNC: 3.8 G/DL (ref 3.4–5)
ALP SERPL-CCNC: 108 U/L (ref 40–150)
ALT SERPL W P-5'-P-CCNC: 26 U/L (ref 0–50)
ANION GAP SERPL CALCULATED.3IONS-SCNC: 4 MMOL/L (ref 3–14)
AST SERPL W P-5'-P-CCNC: 18 U/L (ref 0–45)
BILIRUB SERPL-MCNC: 0.4 MG/DL (ref 0.2–1.3)
BUN SERPL-MCNC: 12 MG/DL (ref 7–30)
CALCIUM SERPL-MCNC: 8.6 MG/DL (ref 8.5–10.1)
CHLORIDE SERPL-SCNC: 112 MMOL/L (ref 94–109)
CHOLEST SERPL-MCNC: 202 MG/DL
CO2 SERPL-SCNC: 25 MMOL/L (ref 20–32)
CREAT SERPL-MCNC: 0.6 MG/DL (ref 0.52–1.04)
GFR SERPL CREATININE-BSD FRML MDRD: >90 ML/MIN/{1.73_M2}
GLUCOSE SERPL-MCNC: 82 MG/DL (ref 70–99)
HDLC SERPL-MCNC: 67 MG/DL
LDLC SERPL CALC-MCNC: 120 MG/DL
NONHDLC SERPL-MCNC: 135 MG/DL
POTASSIUM SERPL-SCNC: 4 MMOL/L (ref 3.4–5.3)
PROT SERPL-MCNC: 7.3 G/DL (ref 6.8–8.8)
SODIUM SERPL-SCNC: 141 MMOL/L (ref 133–144)
TRIGL SERPL-MCNC: 77 MG/DL

## 2021-01-19 LAB
COPATH REPORT: NORMAL
PAP: NORMAL

## 2021-01-19 NOTE — RESULT ENCOUNTER NOTE
Gerardo,    So far there is no growth from the toenail clipping.  I'll let you know when the surveillance ends.  If no fungus, then it is likely a microtrauma process that can be helped by relieving some pressure or rubbing of those nails against shoes.  Cholesterol is a little bit higher than last year, but your cardiovascular risk remains very low.   If you have any questions, please feel free to contact the clinic.    MELANIE Linton    The 10-year ASCVD risk score (Lan SHELLY Jr., et al., 2013) is: 0.8%    Values used to calculate the score:      Age: 50 years      Sex: Female      Is Non- : No      Diabetic: No      Tobacco smoker: No      Systolic Blood Pressure: 100 mmHg      Is BP treated: Yes      HDL Cholesterol: 67 mg/dL      Total Cholesterol: 202 mg/dL

## 2021-01-20 ENCOUNTER — MYC MEDICAL ADVICE (OUTPATIENT)
Dept: FAMILY MEDICINE | Facility: CLINIC | Age: 51
End: 2021-01-20

## 2021-01-20 DIAGNOSIS — I10 ESSENTIAL HYPERTENSION: ICD-10-CM

## 2021-01-20 LAB
FINAL DIAGNOSIS: NORMAL
HPV HR 12 DNA CVX QL NAA+PROBE: NEGATIVE
HPV16 DNA SPEC QL NAA+PROBE: NEGATIVE
HPV18 DNA SPEC QL NAA+PROBE: NEGATIVE
SPECIMEN DESCRIPTION: NORMAL
SPECIMEN SOURCE CVX/VAG CYTO: NORMAL

## 2021-01-21 ENCOUNTER — MYC MEDICAL ADVICE (OUTPATIENT)
Dept: FAMILY MEDICINE | Facility: CLINIC | Age: 51
End: 2021-01-21

## 2021-01-21 RX ORDER — LISINOPRIL 10 MG/1
10 TABLET ORAL DAILY
Qty: 90 TABLET | Refills: 1 | Status: SHIPPED | OUTPATIENT
Start: 2021-01-21 | End: 2021-08-08

## 2021-01-22 NOTE — TELEPHONE ENCOUNTER
Rupa    See pt MyChart message  Per the 2019 colonoscopy repeat in 5 years    Alda Rolle RN   [facility]

## 2021-01-28 NOTE — RESULT ENCOUNTER NOTE
Gerardo,    The final result is no fungal growth from the toenail cutting.  If you have any questions, please feel free to contact the clinic.    MELANIE Linton

## 2021-02-12 LAB
BACTERIA SPEC CULT: NORMAL
SPECIMEN SOURCE: NORMAL

## 2021-07-16 ENCOUNTER — OFFICE VISIT (OUTPATIENT)
Dept: DERMATOLOGY | Facility: CLINIC | Age: 51
End: 2021-07-16
Payer: COMMERCIAL

## 2021-07-16 DIAGNOSIS — Z80.8 FAMILY HISTORY OF MELANOMA: ICD-10-CM

## 2021-07-16 DIAGNOSIS — D48.9 NEOPLASM OF UNCERTAIN BEHAVIOR: Primary | ICD-10-CM

## 2021-07-16 DIAGNOSIS — L82.1 SEBORRHEIC KERATOSIS: ICD-10-CM

## 2021-07-16 DIAGNOSIS — D22.9 MULTIPLE BENIGN NEVI: ICD-10-CM

## 2021-07-16 PROCEDURE — 11102 TANGNTL BX SKIN SINGLE LES: CPT | Performed by: DERMATOLOGY

## 2021-07-16 PROCEDURE — 88304 TISSUE EXAM BY PATHOLOGIST: CPT | Mod: TC | Performed by: DERMATOLOGY

## 2021-07-16 PROCEDURE — 99203 OFFICE O/P NEW LOW 30 MIN: CPT | Mod: 25 | Performed by: DERMATOLOGY

## 2021-07-16 ASSESSMENT — PAIN SCALES - GENERAL: PAINLEVEL: NO PAIN (0)

## 2021-07-16 NOTE — NURSING NOTE
Dermatology Rooming Note    Gerardo Mandujano's goals for this visit include:   Chief Complaint   Patient presents with     Skin Check     gerardo is coming in today for a skin check, states that she has one spot of concern on her back     Alaina Verdin CMA on 7/16/2021 at 12:01 PM

## 2021-07-16 NOTE — LETTER
7/16/2021       RE: Gerardo Mandujano  [address]  [facility] [address]     Dear Colleague,    Thank you for referring your patient, Gerardo Mandujano, to the [facility] [place] [facility]. Please see a copy of my visit note below.    [facility] Dermatology Note  Encounter Date: Jul 16, 2021  Office Visit     Dermatology Problem List:  # Family history melanoma (father).  #. NUB, right posterior thigh, symptomatic nevus   -s/p biopsy 7/16/21    ____________________________________________    Assessment & Plan:  # NUB, right posterior thigh, ddx symptomatic nevus   - s/p biopsy. See procedure note.    # Multiple benign nevi.   # Family history melanoma (father).  - No concerning lesions today --> Will monitor moles on right inner thigh and left upper shin  - Counseled on ABCDEs of melanoma and sun protection - recommend SPF 30 or higher with frequent application   - Return sooner if noticing changing or symptomatic lesions    # Benign lesions: seborrheic keratoses, cherry angiomas.  Discussed the natural history and benign nature of this lesion. Reassurance provided that no additional treatment is necessary.     Procedures Performed:   - Shave biopsy procedure note, location(s): right posterior thigh. After discussion of benefits and risks including but not limited to bleeding, infection, scar, incomplete removal, recurrence, and non-diagnostic biopsy, written consent and photographs were obtained. The area was cleaned with isopropyl alcohol. 0.5mL of 1% lidocaine with epinephrine was injected to obtain adequate anesthesia of lesion(s). Shave biopsy at site(s) performed. Hemostasis was achieved with aluminium chloride. Petrolatum ointment and a sterile dressing were applied. The patient tolerated the procedure and no complications were noted. The patient was provided with verbal and written post care instructions.      Follow-up: 1 year(s) in-person, or earlier for new or changing lesions    Staff and Scribe:     Provider Disclosure:   The documentation recorded by the scribe accurately reflects the services I personally performed and the decisions made by me.    Luz Figueroa MD    Department of Dermatology  [facility]: Phone: [phone], Fax: [phone]  7/20/2021       Scribe Disclosure:  I, Lisa Kearns, am serving as a scribe to document services personally performed by Luz Figueroa MD at this visit, based upon the provider's statements to me. All documentation has been reviewed by the aforementioned provider prior to being entered into the official medical record.     ILisa, a scribe, prepared the chart for today's encounter.   ____________________________________________    CC: Skin Check (gerardo is coming in today for a skin check, states that she has one spot of concern on her back)      HPI:  Ms. Gerardo Mandujano is a(n) 50 year old female who presents today as a new patient for FBSE. Today, the patient denies any personal history of skin cancer but endorses a family history of melanoma. Recently, she noticed a mole to her back that has some associated itchiness but no associated pain. She also recently noticed a newer raised mole to her chest. She has not noticed any recent changes to this mole. The patient does wear sunscreen. The patient otherwise denies any spots that are itching, changing, bleeding, or growing.     Patient is otherwise feeling well, without additional skin concerns.    Labs Reviewed:  N/A    Physical Exam:  Vitals: LMP 11/05/2012   SKIN: Total skin excluding the undergarment areas was performed. The exam included the head/face, neck, both arms, chest, back, abdomen, both legs, digits and/or nails.   - Right posterior thigh fleshy pedunculated papule   - Right inner thigh  approximately 4 mm even light brown macule superiorly with slight pedicle, dermoscopy very evenly reticulated   - There are dome shaped bright red papules on the back and extremities.   - Multiple regular brown pigmented macules and papules are identified on the trunk and extremities.   - Left upper shin somewhat of a triangular shaped light-medium brown macule, dermoscopy faintly reticulated pigment   - There are waxy stuck on tan to brown papules on the trunk, extremities, and scalp.   - No other lesions of concern on areas examined.     Medications:  Current Outpatient Medications   Medication     FERROUS GLUCONATE     gabapentin (NEURONTIN) 100 MG capsule     ibuprofen 200 MG capsule     lisinopril (ZESTRIL) 10 MG tablet     VITAMIN D, CHOLECALCIFEROL, PO     fluticasone (FLONASE) 50 MCG/ACT spray     No current facility-administered medications for this visit.        Past Medical History:   Patient Active Problem List   Diagnosis     CARDIOVASCULAR SCREENING; LDL GOAL LESS THAN 160     Tension headache     Headache     Neck pain     Spasm of muscle     Arthralgia of temporomandibular joint     Benign essential hypertension     Past Medical History:   Diagnosis Date     Allergies     Seasonal     Tension headache         CC Referred Self, MD  No address on file on close of this encounter.

## 2021-07-16 NOTE — PATIENT INSTRUCTIONS
Seborrheic keratoses         Wound Care After a Biopsy    What is a skin biopsy?  A skin biopsy allows the doctor to examine a very small piece of tissue under the microscope to determine the diagnosis and the best treatment for the skin condition. A local anesthetic (numbing medicine)  is injected with a very small needle into the skin area to be tested. A small piece of skin is taken from the area. Sometimes a suture (stitch) is used.     What are the risks of a skin biopsy?  I will experience scar, bleeding, swelling, pain, crusting and redness. I may experience incomplete removal or recurrence. Risks of this procedure are excessive bleeding, bruising, infection, nerve damage, numbness, thick (hypertrophic or keloidal) scar and non-diagnostic biopsy.    How should I care for my wound for the first 24 hours?    Keep the wound dry and covered for 24 hours    If it bleeds, hold direct pressure on the area for 15 minutes. If bleeding does not stop then go to the emergency room    Avoid strenuous exercise the first 1-2 days or as your doctor instructs you    How should I care for the wound after 24 hours?    After 24 hours, remove the bandage    You may bathe or shower as normal    If you had a scalp biopsy, you can shampoo as usual and can use shower water to clean the biopsy site daily    Clean the wound twice a day with gentle soap and water    Do not scrub, be gentle    Apply white petroleum/Vaseline after cleaning the wound with a cotton swab or a clean finger, and keep the site covered with a Bandaid /bandage. Bandages are not necessary with a scalp biopsy    If you are unable to cover the site with a Bandaid /bandage, re-apply ointment 2-3 times a day to keep the site moist. Moisture will help with healing    Avoid strenuous activity for first 1-2 days    Avoid lakes, rivers, pools, and oceans until the stitches are removed or the site is healed    How do I clean my wound?    Wash hands thoroughly with soap or  use hand  before all wound care    Clean the wound with gentle soap and water    Apply white petroleum/Vaseline  to wound after it is clean    Replace the Bandaid /bandage to keep the wound covered for the first few days or as instructed by your doctor    If you had a scalp biopsy, warm shower water to the area on a daily basis should suffice    What should I use to clean my wound?     Cotton-tipped applicators (Qtips )    White petroleum jelly (Vaseline ). Use a clean new container and use Q-tips to apply.    Bandaids   as needed    Gentle soap     How should I care for my wound long term?    Do not get your wound dirty    Keep up with wound care for one week or until the area is healed.    A small scab will form and fall off by itself when the area is completely healed. The area will be red and will become pink in color as it heals. Sun protection is very important for how your scar will turn out. Sunscreen with an SPF 30 or greater is recommended once the area is healed.    If you have stitches, stitches need to be removed in 14 days. You may return to our clinic for this or you may have it done locally at your doctor s office.    You should have some soreness but it should be mild and slowly go away over several days. Talk to your doctor about using tylenol for pain,    When should I call my doctor?  If you have increased:     Pain or swelling    Pus or drainage (clear or slightly yellow drainage is ok)    Temperature over 100F    Spreading redness or warmth around wound    When will I hear about my results?  The biopsy results can take 2 weeks to come back.  Your results will automatically release to [employer] before your provider has even reviewed them.  The clinic will call you with the results, send you a Swift [employer] message, or have you schedule a follow-up clinic or phone time to discuss the results.  Contact our clinics if you do not hear from us in 2 weeks.    Who should I call with  questions?    [facility]: [phone]    [facility]: [phone]    For urgent needs outside of business hours call the [facility] at [phone] and ask for the dermatology resident on call      Patient Education     Checking for Skin Cancer  You can find cancer early by checking your skin each month. There are 3 kinds of skin cancer. They are melanoma, basal cell carcinoma, and squamous cell carcinoma. Doing monthly skin checks is the best way to find new marks or skin changes. Follow the instructions below for checking your skin.   The ABCDEs of checking moles for melanoma   Check your moles or growths for signs of melanoma using ABCDE:     Asymmetry: the sides of the mole or growth don t match    Border: the edges are ragged, notched, or blurred    Color: the color within the mole or growth varies    Diameter: the mole or growth is larger than 6 mm (size of a pencil eraser)    Evolving: the size, shape, or color of the mole or growth is changing (evolving is not shown in the images below)    Checking for other types of skin cancer  Basal cell carcinoma or squamous cell carcinoma have symptoms such as:       A spot or mole that looks different from all other marks on your skin    Changes in how an area feels, such as itching, tenderness, or pain    Changes in the skin's surface, such as oozing, bleeding, or scaliness    A sore that does not heal    New swelling or redness beyond the border of a mole    Who s at risk?  Anyone can get skin cancer. But you are at greater risk if you have:     Fair skin, light-colored hair, or light-colored eyes    Many moles or abnormal moles on your skin    A history of sunburns from sunlight or tanning beds    A family history of skin cancer    A history of exposure to radiation or chemicals    A weakened immune system  If you have had skin cancer in the past, you are at risk for recurring skin cancer.   How  to check your skin  Do your monthly skin checkups in front of a full-length mirror. Check all parts of your body, including your:     Head (ears, face, neck, and scalp)    Torso (front, back, and sides)    Arms (tops, undersides, upper, and lower armpits)    Hands (palms, backs, and fingers, including under the nails)    Buttocks and genitals    Legs (front, back, and sides)    Feet (tops, soles, toes, including under the nails, and between toes)  If you have a lot of moles, take digital photos of them each month. Make sure to take photos both up close and from a distance. These can help you see if any moles change over time.   Most skin changes are not cancer. But if you see any changes in your skin, call your doctor right away. Only he or she can diagnose a problem. If you have skin cancer, seeing your doctor can be the first step toward getting the treatment that could save your life.   [employer] last reviewed this educational content on 4/1/2019 2000-2020 The [employer]. [address] [place] [address]. All rights reserved. This information is not intended as a substitute for professional medical care. Always follow your healthcare professional's instructions.       When should I call my doctor?    If you are worsening or not improving, please, contact us or seek urgent care as noted below.     Who should I call with questions (adults)?    [facility] (adult and pediatric): [phone]    [facility] (adult): [phone]    For urgent needs outside of business hours call the [facility] at [phone] and ask for the dermatology resident on call to be paged    If this is a medical emergency and you are unable to reach an ER, Call [phone]    Who should I call with questions (pediatric)?  [facility]- Pediatric Dermatology  Dr. Mable Shelton, Dr. Miguel Shay, Dr. Tenisha Salgado, Lucila To,  PA, Dr. Olinda Monet, Dr. Izzy Chávez & Dr. Luis Pedraza  Non-urgent nurse triage line; [phone]- Janell and Parul RUFFIN Care Coordinatoralesia Butcher (/Complex ) [phone]    If you need a prescription refill, please contact your pharmacy. Refills are approved or denied by our Physicians during normal business hours, Monday through Fridays  Per office policy, refills will not be granted if you have not been seen within the past year (or sooner depending on your child's condition)    Scheduling Information:  Pediatric Appointment Scheduling and Call Center [phone]  Radiology Scheduling- [phone]  Sedation Unit Scheduling- [phone]  [place] Scheduling- [facility] [phone]; Pediatric Dermatology [phone]  Main  Services: [phone]  Micronesian: [phone]  Beninese: [phone]  Hmong/Icelandic/Citizen of [place]: [phone]  Preadmission Nursing Department Fax Number: [phone] (Fax all pre-operative paperwork to this number)    For urgent matters arising during evenings, weekends, or holidays that cannot wait for normal business hours please call [phone] and ask for the dermatology resident on call to be paged.

## 2021-07-20 LAB
PATH REPORT.COMMENTS IMP SPEC: NORMAL
PATH REPORT.COMMENTS IMP SPEC: NORMAL
PATH REPORT.FINAL DX SPEC: NORMAL
PATH REPORT.GROSS SPEC: NORMAL
PATH REPORT.MICROSCOPIC SPEC OTHER STN: NORMAL
PATH REPORT.RELEVANT HX SPEC: NORMAL

## 2021-08-05 DIAGNOSIS — I10 ESSENTIAL HYPERTENSION: ICD-10-CM

## 2021-08-08 RX ORDER — LISINOPRIL 10 MG/1
10 TABLET ORAL DAILY
Qty: 90 TABLET | Refills: 1 | Status: SHIPPED | OUTPATIENT
Start: 2021-08-08 | End: 2022-02-01

## 2021-09-14 ENCOUNTER — MYC MEDICAL ADVICE (OUTPATIENT)
Dept: PODIATRY | Facility: CLINIC | Age: 51
End: 2021-09-14

## 2021-09-14 DIAGNOSIS — M21.41 PES PLANUS OF BOTH FEET: Primary | ICD-10-CM

## 2021-09-14 DIAGNOSIS — M21.42 PES PLANUS OF BOTH FEET: Primary | ICD-10-CM

## 2021-09-14 DIAGNOSIS — Z87.312 HISTORY OF STRESS FRACTURE: ICD-10-CM

## 2021-09-16 ENCOUNTER — MYC MEDICAL ADVICE (OUTPATIENT)
Dept: FAMILY MEDICINE | Facility: CLINIC | Age: 51
End: 2021-09-16

## 2021-09-18 ENCOUNTER — HEALTH MAINTENANCE LETTER (OUTPATIENT)
Age: 51
End: 2021-09-18

## 2021-09-20 NOTE — TELEPHONE ENCOUNTER
Diagnoses attached and order signed. Please let Gerardo know and provided location details.     Thank you.    Dr. Boyd

## 2021-11-15 ENCOUNTER — ANCILLARY PROCEDURE (OUTPATIENT)
Dept: MAMMOGRAPHY | Facility: CLINIC | Age: 51
End: 2021-11-15
Attending: NURSE PRACTITIONER
Payer: COMMERCIAL

## 2021-11-15 DIAGNOSIS — Z12.31 VISIT FOR SCREENING MAMMOGRAM: ICD-10-CM

## 2021-11-15 PROCEDURE — 77067 SCR MAMMO BI INCL CAD: CPT

## 2021-11-15 PROCEDURE — 77067 SCR MAMMO BI INCL CAD: CPT | Mod: 26 | Performed by: RADIOLOGY

## 2021-11-19 ENCOUNTER — OFFICE VISIT (OUTPATIENT)
Dept: PODIATRY | Facility: CLINIC | Age: 51
End: 2021-11-19
Payer: COMMERCIAL

## 2021-11-19 VITALS
BODY MASS INDEX: 22.66 KG/M2 | HEIGHT: 65 IN | SYSTOLIC BLOOD PRESSURE: 108 MMHG | DIASTOLIC BLOOD PRESSURE: 70 MMHG | WEIGHT: 136 LBS

## 2021-11-19 DIAGNOSIS — R29.898 WEAKNESS OF LEFT FOOT: ICD-10-CM

## 2021-11-19 DIAGNOSIS — M20.61 DEFORMITY OF TOE OF RIGHT FOOT: Primary | ICD-10-CM

## 2021-11-19 DIAGNOSIS — L60.8 CHANGE IN NAIL APPEARANCE: ICD-10-CM

## 2021-11-19 PROCEDURE — 99213 OFFICE O/P EST LOW 20 MIN: CPT | Performed by: PODIATRIST

## 2021-11-19 ASSESSMENT — MIFFLIN-ST. JEOR: SCORE: 1233.8

## 2021-11-19 NOTE — PROGRESS NOTES
ASSESSMENT:  Encounter Diagnoses   Name Primary?     Deformity of toe of right foot Yes     Change in nail appearance      Weakness of left foot      MEDICAL DECISION MAKING:  I discussed her right fourth toe.  I explained that having some contracture and varus rotation is anatomically normal in many cases.  I am more concerned when there is pain.  I agree with the metatarsal pads, yet they might be causing her discomfort.  If she has unable to adjust to them, I suggest she return to the the orthotic department for modification.  I also discussed the use of a crest pad which can help elevate the tip of the toe.  She is interested in this.  No other recommendations.  She is not having pain and therefore an arthroplasty with derotational skin plasty is not indicated.    We discussed changes in nail appearance.  Fungal cultures do not prove that fungus is involved.  Nonetheless, I suspect the mild thickening of the toenails is related to the digital contractures and stress on the nail unit.  A trial of an over-the-counter topical antifungal medication is reasonable.  No other recommendations at this time.    I did not appreciate weakness of the left foot on clinical exam.  I offered a referral to neurology versus physical therapy.  She is interested in physical therapy and a referral is placed.     All questions were answered.    Disclaimer: This note consists of symbols derived from keyboarding, dictation and/or voice recognition software. As a result, there may be errors in the script that have gone undetected. Please consider this when interpreting information found in this chart.    Jann Boyd DPM, FACFAS, [facility] Department of Podiatry/Foot & Ankle Surgery      ____________________________________________________________________    HPI:         Gerardo presents today with several concerns.  None of these involved pain.    She inquires about the position of the right fourth toe.  She demonstrates how it  "underlies the fourth toe to some degree.  She also points out some callusing on the lateral aspect of the toe near the nail unit.  She discussed this with her orthotist and metatarsal pads were added to her custom orthoses.  These are new devices and she continues to break them in.  Metatarsal pads caused her some discomfort.    She also inquires about changes in her right third and fourth toenails.  There is some mild thickening.  She explains that her primary care provider had fungal cultures done.  These were negative.    Another concern is left foot weakness.  She perceives this during exercise such as yoga.  It does not affect her activities of daily living.    Past Medical History:   Diagnosis Date     Allergies     Seasonal     Tension headache    *  *  Past Surgical History:   Procedure Laterality Date     C ANESTH, SECTION       LAPAROSCOPIC HYSTERECTOMY SUPRACERVICAL  2012    benign path   *  *  Current Outpatient Medications   Medication Sig Dispense Refill     FERROUS GLUCONATE Reported on 3/22/2017       gabapentin (NEURONTIN) 100 MG capsule Take 3 capsules (300 mg) by mouth 3 times daily 90 capsule 3     ibuprofen 200 MG capsule Take 200 mg by mouth as needed       lisinopril (ZESTRIL) 10 MG tablet Take 1 tablet (10 mg) by mouth daily 90 tablet 1     VITAMIN D, CHOLECALCIFEROL, PO Take  by mouth daily.           EXAM:    Vitals: /70   Ht 1.645 m (5' 4.75\")   Wt 61.7 kg (136 lb)   LMP 2012   BMI 22.81 kg/m    BMI: Body mass index is 22.81 kg/m .    Constitutional:  Gerardo Mandujano is in no apparent distress, appears well-nourished.  Cooperative with history and physical exam.    Vascular:  Pedal pulses are palpable for both the DP and PT arteries.  CFT < 3 sec.  No edema.      Neuro: Light touch sensation is intact to the L4, L5, S1 distributions  No evidence of weakness, spasticity, or contracture in the lower extremities.     Derm: Normal texture and turgor.  No " erythema, ecchymosis, or cyanosis.  No open lesions.  There is a small hyperkeratotic lesion over the lateral skin fold of the right fourth toe.    Mild thickening of the right third and fourth toenail.  No dystrophia.    Musculoskeletal:    Lower extremity muscle strength is normal. No gross deformities.  She has mild contracture and varus rotation of the bilateral fourth and fifth toes.  These toes are flexible are reducible.

## 2021-11-19 NOTE — LETTER
11/19/2021         RE: Gerardo Mandujano  [address]  [place] [address]        Dear Colleague,    Thank you for referring your patient, Gerardo Mandujano, to the [facility]. Please see a copy of my visit note below.    ASSESSMENT:  Encounter Diagnoses   Name Primary?     Deformity of toe of right foot Yes     Change in nail appearance      Weakness of left foot      MEDICAL DECISION MAKING:  I discussed her right fourth toe.  I explained that having some contracture and varus rotation is anatomically normal in many cases.  I am more concerned when there is pain.  I agree with the metatarsal pads, yet they might be causing her discomfort.  If she has unable to adjust to them, I suggest she return to the the orthotic department for modification.  I also discussed the use of a crest pad which can help elevate the tip of the toe.  She is interested in this.  No other recommendations.  She is not having pain and therefore an arthroplasty with derotational skin plasty is not indicated.    We discussed changes in nail appearance.  Fungal cultures do not prove that fungus is involved.  Nonetheless, I suspect the mild thickening of the toenails is related to the digital contractures and stress on the nail unit.  A trial of an over-the-counter topical antifungal medication is reasonable.  No other recommendations at this time.    I did not appreciate weakness of the left foot on clinical exam.  I offered a referral to neurology versus physical therapy.  She is interested in physical therapy and a referral is placed.     All questions were answered.    Disclaimer: This note consists of symbols derived from keyboarding, dictation and/or voice recognition software. As a result, there may be errors in the script that have gone undetected. Please consider this when interpreting information found in this chart.    Jann Boyd, BLADIMIR, FACFAS, MS    [facility] Department of Podiatry/Foot & Ankle  "Surgery      ____________________________________________________________________    HPI:         Gerardo presents today with several concerns.  None of these involved pain.    She inquires about the position of the right fourth toe.  She demonstrates how it underlies the fourth toe to some degree.  She also points out some callusing on the lateral aspect of the toe near the nail unit.  She discussed this with her orthotist and metatarsal pads were added to her custom orthoses.  These are new devices and she continues to break them in.  Metatarsal pads caused her some discomfort.    She also inquires about changes in her right third and fourth toenails.  There is some mild thickening.  She explains that her primary care provider had fungal cultures done.  These were negative.    Another concern is left foot weakness.  She perceives this during exercise such as yoga.  It does not affect her activities of daily living.    Past Medical History:   Diagnosis Date     Allergies     Seasonal     Tension headache    *  *  Past Surgical History:   Procedure Laterality Date     C ANESTH, SECTION       LAPAROSCOPIC HYSTERECTOMY SUPRACERVICAL  2012    benign path   *  *  Current Outpatient Medications   Medication Sig Dispense Refill     FERROUS GLUCONATE Reported on 3/22/2017       gabapentin (NEURONTIN) 100 MG capsule Take 3 capsules (300 mg) by mouth 3 times daily 90 capsule 3     ibuprofen 200 MG capsule Take 200 mg by mouth as needed       lisinopril (ZESTRIL) 10 MG tablet Take 1 tablet (10 mg) by mouth daily 90 tablet 1     VITAMIN D, CHOLECALCIFEROL, PO Take  by mouth daily.           EXAM:    Vitals: /70   Ht 1.645 m (5' 4.75\")   Wt 61.7 kg (136 lb)   LMP 2012   BMI 22.81 kg/m    BMI: Body mass index is 22.81 kg/m .    Constitutional:  Gerardo Mandujano is in no apparent distress, appears well-nourished.  Cooperative with history and physical exam.    Vascular:  Pedal pulses are palpable for " both the DP and PT arteries.  CFT < 3 sec.  No edema.      Neuro: Light touch sensation is intact to the L4, L5, S1 distributions  No evidence of weakness, spasticity, or contracture in the lower extremities.     Derm: Normal texture and turgor.  No erythema, ecchymosis, or cyanosis.  No open lesions.  There is a small hyperkeratotic lesion over the lateral skin fold of the right fourth toe.    Mild thickening of the right third and fourth toenail.  No dystrophia.    Musculoskeletal:    Lower extremity muscle strength is normal. No gross deformities.  She has mild contracture and varus rotation of the bilateral fourth and fifth toes.  These toes are flexible are reducible.              Again, thank you for allowing me to participate in the care of your patient.        Sincerely,        Jann Boyd DPM

## 2022-02-01 ENCOUNTER — OFFICE VISIT (OUTPATIENT)
Dept: FAMILY MEDICINE | Facility: CLINIC | Age: 52
End: 2022-02-01
Payer: COMMERCIAL

## 2022-02-01 ENCOUNTER — TELEPHONE (OUTPATIENT)
Dept: FAMILY MEDICINE | Facility: CLINIC | Age: 52
End: 2022-02-01

## 2022-02-01 VITALS
WEIGHT: 127 LBS | DIASTOLIC BLOOD PRESSURE: 71 MMHG | SYSTOLIC BLOOD PRESSURE: 101 MMHG | OXYGEN SATURATION: 97 % | HEART RATE: 78 BPM | BODY MASS INDEX: 21.68 KG/M2 | HEIGHT: 64 IN | TEMPERATURE: 97.8 F

## 2022-02-01 DIAGNOSIS — Z13.220 SCREENING FOR LIPID DISORDERS: ICD-10-CM

## 2022-02-01 DIAGNOSIS — Z11.59 ENCOUNTER FOR HEPATITIS C SCREENING TEST FOR LOW RISK PATIENT: ICD-10-CM

## 2022-02-01 DIAGNOSIS — Z23 NEED FOR ZOSTER VACCINATION: ICD-10-CM

## 2022-02-01 DIAGNOSIS — I10 BENIGN ESSENTIAL HYPERTENSION: ICD-10-CM

## 2022-02-01 DIAGNOSIS — M54.2 CERVICALGIA: ICD-10-CM

## 2022-02-01 DIAGNOSIS — E55.9 HYPOVITAMINOSIS D: ICD-10-CM

## 2022-02-01 DIAGNOSIS — Z00.00 ROUTINE GENERAL MEDICAL EXAMINATION AT A HEALTH CARE FACILITY: Primary | ICD-10-CM

## 2022-02-01 PROCEDURE — 90471 IMMUNIZATION ADMIN: CPT | Performed by: NURSE PRACTITIONER

## 2022-02-01 PROCEDURE — 99396 PREV VISIT EST AGE 40-64: CPT | Mod: 25 | Performed by: NURSE PRACTITIONER

## 2022-02-01 PROCEDURE — 99214 OFFICE O/P EST MOD 30 MIN: CPT | Mod: 25 | Performed by: NURSE PRACTITIONER

## 2022-02-01 PROCEDURE — 90750 HZV VACC RECOMBINANT IM: CPT | Performed by: NURSE PRACTITIONER

## 2022-02-01 RX ORDER — ZINC GLUCONATE 50 MG
TABLET ORAL DAILY
COMMUNITY
Start: 2022-02-01 | End: 2023-03-15

## 2022-02-01 RX ORDER — LISINOPRIL 10 MG/1
10 TABLET ORAL DAILY
Qty: 90 TABLET | Refills: 1 | Status: SHIPPED | OUTPATIENT
Start: 2022-02-01 | End: 2022-07-26

## 2022-02-01 RX ORDER — IBUPROFEN 200 MG
CAPSULE ORAL 2 TIMES DAILY
COMMUNITY
Start: 2022-02-01 | End: 2023-03-15

## 2022-02-01 RX ORDER — MULTIVITAMIN WITH IRON
TABLET ORAL DAILY
COMMUNITY
Start: 2022-02-01 | End: 2023-03-15

## 2022-02-01 ASSESSMENT — MIFFLIN-ST. JEOR: SCORE: 1176.07

## 2022-02-01 NOTE — TELEPHONE ENCOUNTER
Prior Authorization:    Pharmacy:[id number] - [place], [place] - [address] [place] [address] AT [address] (Pharmacy)    Medication:diclofenac (VOLTAREN) 1 % topical gel      Reason: Plan does not cover this medication.

## 2022-02-01 NOTE — PATIENT INSTRUCTIONS
"  Call [phone] to schedule xray of the neck - possibly is arthritis    Massage therapist  Clementina Marquez - [facility]  [address]   [place], [place]  Call [phone]  [email or web address]  Clementina does massage and craniosacral for all ages with an emphasis on education      Look at therapists at [facility] - [email or web address]    [facility] - [phone]  Sabrina Hodges;   Heiid Srivastava at [place];   Cale Barger - [place]    Outside of [place] - recommended by patients who have had good experiences    [facility]  Clementina Quiñones (?taking new patients?)  [phone]    [facility] in [place] - birth trauma, body work  [phone]    Zoe Ravena - [place] and [place] on [address]  [phone]    Karen Marietta  [phone]  [address]   [place], [place], [address]    [facility] - \"sex-positive\"  [address]  [address]  [place], [place] [address]     Guille Brooks - LGBT experience  [facility] on [facility] in [place]  [address]  [place] [address]  Telephone: [phone]    Evelyn Wetzel  [address]  [place] [address]  Phone: [phone]  [email or web address]    [facility] [employer] - family systems   [email or web address]  [address]  [place] [address]  Tel: [phone]    Kaitlin Gusman  [email or web address]     Marcia Oates [email or web address]      1.\"Eat food.  Not too much.  Mostly plants\" - Jann Pollen - see link below  - Aim for 5-7 servings of vegetables (raw vegetables - 1 serving = 1/2 cup; raw greens - 1 serving = 1 cup)   - Eat grains, but don't make them the superstar of your meal and DO make them whole grains  2. " "AVOID sugar.  There is no nutritional benefit to sugar.  3. Drink lots of water (avoid juice and soda)  4. MOVE your body daily - even if some days this means 5 minutes of movement. Walking is great for your bones and brain.  Do aim for 150 minutes per week of activity that raises your heart rate, but \"don't let the ideal get in the way of the good enough\"  5. Get good sleep (6-8 hours/night- less sleep is associated with disease/illness/obesity)   6. Smile and laugh every day - even in the face of tragedy  7. Start a meditation or mindfulness practice    CALCIUM: The average recommended intake for women is 9331-1693 mg calcium daily. It is best to get this from your diet (Milk, yogurt, and cheese, vegetables such as Chinese cabbage, kale, spinach and broccoli). If you are not eating enough calcium, then I recommend Calcium Citrate/vitD supplements daily.     Vitamin D is an essential nutritient that many [place] lack, especially in the winter. It is vital for healthy immune system functioning and can be linked to diseases such as obesity, diabetes, body aches/pain, etc. It is super safe and highly beneficial for a [place] to take a vitamin D3 2000 IU once daily during winter months. Daily vitamin D for life is recommended for anyone who has ever been found deficient.    Other things to consider: do not drive while under the influence of alcohol, do not drive while texting, always wear a seatbelt, wear a helmet when biking, wear sunscreen to help prevent skin cancer, use DEET mosquito spray when outdoors to prevent mosquito and tick bites, & avoid smoking. If you do get bit by a DEER tick, please contact my office immediately to consider lyme prophylaxis. Dental visits recommended every 6-12 months.    Jann Rodriguez's 7 Rules for Eating  [email or web address]://[email or web address]/food-recipes/[email or web address]/7-rules-for-eating#1    My clinic hours are as follows:    Monday virtual appointments 10a-12p    Tuesday in clinic appts " "7a-3:30p    Wed in clinic appts 3-6 pm    If you need an appointment urgently and do not find one available on Wheely or with Central scheduling, please send me a Wheely message and I will work to get you scheduled with myself or a colleague here     Clinic notes    Lab and imaging results are now available for you to view immediately on completion.  Please know that I often have not seen the result before you see results.  I will interpret and discuss the results and meaning of the results as soon as I am able to review them.     Wheely is the best way to reach the clinic directly.  When you send a Wheely message this will be read by our clinic RNs.    Please know that when you call the clinic number, you are not speaking to a staff member from our local clinic.  You can ask that staff to speak to a clinic staff directly if you wish.    You will be able to read my documentation (\"provider notes\") when I finish up and close your chart.  I encourage you to read through to understand your diagnosis and the plan and how we arrived there.  It is also an opportunity to make sure I fully understood your concerns.    "

## 2022-02-01 NOTE — PROGRESS NOTES
SUBJECTIVE:   CC: Gerardo Mandujano is an 51 year old woman who presents for preventive health visit.     Blood Pressure- Checks BP 1x/mth. Usually in the 100s/70s. Low salt diet. Tried to come off lisinopril 10 mg in the past and had increased BPs at that time. HTN runs in the family.     Headaches- 1x/wk, takes advil about 1x/wk which helps. Starts in base of neck and radiates up the sides of the head. Feels related to tension in neck. Tension headaches originate in front of head or like band around head. Light stretching of neck helps relieve headaches.      Pain- Has had neck and shoulder pain for years - left side worse.  Often pain is worst in the middle of the night.  Workouts help and worsen it, trying to find the right balance of activity. Taking Gabapentin prn which seems to help some. Has been to the chiropractor and massage before. Has also done PT, but not interested at this time. Yoga and pilates help. Planning to see chiropractor again. Heating pad helps. Takes magnesium nightly. Has tried flexeril, did not seem to help much. Wants to find someone else who does cranial sacral work.    Mental Health- Feeling more down after 2 years of the pandemic.14 year old son has also been struggling with mental health, which has been hard. Would be interested in therapy, not interested in medications at this time.     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  Influenza at [facility] 10/2021    Patient has been advised of split billing requirements and indicates understanding: Yes  Healthy Habits:     Getting at least 3 servings of Calcium per day:  Yes    Bi-annual eye exam:  Yes    Dental care twice a year:  Yes    Sleep apnea or symptoms of sleep apnea:  None    Diet:  Vegetarian/vegan    Frequency of exercise:  6-7 days/week    Duration of exercise:  45-60 minutes    Taking medications regularly:  Yes    Medication side effects:  Not applicable    PHQ-2 Total Score: 0    Additional concerns  today:  No      Today's PHQ-2 Score:   PHQ-2 ( 1999 Pf[employer]) 1/31/2022   Q1: Little interest or pleasure in doing things 0   Q2: Feeling down, depressed or hopeless 0   PHQ-2 Score 0   PHQ-2 Total Score (12-17 Years)- Positive if 3 or more points; Administer PHQ-A if positive -   Q1: Little interest or pleasure in doing things Not at all   Q2: Feeling down, depressed or hopeless Not at all   PHQ-2 Score 0       Abuse: Current or Past (Physical, Sexual or Emotional) - No  Do you feel safe in your environment? Yes    Have you ever done Advance Care Planning? (For example, a Health Directive, POLST, or a discussion with a medical provider or your loved ones about your wishes): Yes, patient states has an Advance Care Planning document and will bring a copy to the clinic.    Social History     Tobacco Use     Smoking status: Never Smoker     Smokeless tobacco: Never Used   Substance Use Topics     Alcohol use: Yes     Comment: 0-1 drink per week     If you drink alcohol do you typically have >3 drinks per day or >7 drinks per week? No    Alcohol Use 2/1/2022   Prescreen: >3 drinks/day or >7 drinks/week? -   Prescreen: >3 drinks/day or >7 drinks/week? No   No flowsheet data found.    Reviewed orders with patient.  Reviewed health maintenance and updated orders accordingly - Yes  Lab work is in process  Labs reviewed in EPIC    Breast Cancer Screening:    FHS-7:   Breast CA Risk Assessment (FHS-7) 11/15/2021 1/31/2022   Did any of your first-degree relatives have breast or ovarian cancer? No No   Did any of your relatives have bilateral breast cancer? No No   Did any man in your family have breast cancer? No No   Did any woman in your family have breast and ovarian cancer? No No   Did any woman in your family have breast cancer before age 50 y? No No   Do you have 2 or more relatives with breast and/or ovarian cancer? No Yes   Do you have 2 or more relatives with breast and/or bowel cancer? No Yes     click delete button to  "remove this line now  Mammogram Screening: Recommended annual mammography  Pertinent mammograms are reviewed under the imaging tab.    History of abnormal Pap smear: NO - age 30-65 PAP every 5 years with negative HPV co-testing recommended  PAP / HPV Latest Ref Rng & Units 1/15/2021 10/2/2015 3/4/2013   PAP (Historical) - NIL NIL NIL   HPV16 NEG:Negative Negative Negative -   HPV18 NEG:Negative Negative Negative -   HRHPV NEG:Negative Negative Negative -     Reviewed and updated as needed this visit by clinical staff   Allergies              Reviewed and updated as needed this visit by Provider               Review of Systems  CONSTITUTIONAL: NEGATIVE for fever, chills, change in weight  INTEGUMENTARY/SKIN: NEGATIVE for worrisome rashes, moles or lesions  EYES: NEGATIVE for vision changes or irritation  ENT: NEGATIVE for ear, mouth and throat problems  RESP: NEGATIVE for significant cough or SOB  BREAST: NEGATIVE for masses, tenderness or discharge  CV: NEGATIVE for chest pain, palpitations or peripheral edema  GI: NEGATIVE for nausea, abdominal pain, heartburn, or change in bowel habits  : NEGATIVE for unusual urinary or vaginal symptoms. No vaginal bleeding.  MUSCULOSKELETAL: POSITIVE for cervicalgia.   NEURO: NEGATIVE for weakness, dizziness or paresthesias. POSITIVE for headaches.  PSYCHIATRIC: POSITIVE for feelings of sadness.      OBJECTIVE:   /71 (BP Location: Left arm, Patient Position: Sitting, Cuff Size: Adult Regular)   Pulse 78   Temp 97.8  F (36.6  C) (Temporal)   Ht 1.626 m (5' 4\")   Wt 57.6 kg (127 lb)   LMP 11/05/2012   SpO2 97%   BMI 21.80 kg/m    Physical Exam  GENERAL APPEARANCE: healthy, alert and no distress  EYES: Eyes grossly normal to inspection, PERRL and conjunctivae and sclerae normal  HENT: ear canals and TM's normal, nose and mouth without ulcers or lesions, oropharynx clear and oral mucous membranes moist  NECK: no adenopathy, no asymmetry, masses, or scars and thyroid " normal to palpation  RESP: lungs clear to auscultation - no rales, rhonchi or wheezes  BREAST: normal without masses, tenderness or nipple discharge and no palpable axillary masses or adenopathy  CV: regular rate and rhythm, normal S1 S2, no S3 or S4, no murmur, click or rub, no peripheral edema and peripheral pulses strong  ABDOMEN: soft, nontender, no hepatosplenomegaly, no masses and bowel sounds normal  MS: no musculoskeletal defects are noted and gait is age appropriate without ataxia. Limited ROM with lateral bending of neck.  SKIN: no suspicious lesions or rashes  NEURO: Normal strength and tone, sensory exam grossly normal, mentation intact and speech normal  PSYCH: mentation appears normal and affect normal/bright    Diagnostic Test Results:  Labs reviewed in Epic  No results found for this or any previous visit (from the past 24 hour(s)).    ASSESSMENT/PLAN:   (Z00.00) Routine general medical examination at a health care facility  (primary encounter diagnosis)  Comment:   Plan:     (M54.2) Cervicalgia  Comment: Neck and shoulder pain for years  Plan: XR Cervical Spine 2/3 Views, diclofenac         (VOLTAREN) 1 % topical gel            (I10) Benign essential hypertension  Comment: Well-controlled on lisinopril 10 mg daily. Discussed trial off medication, but pt stated BP became elevated in past with this.   Plan: Basic metabolic panel  (Ca, Cl, CO2, Creat,         Gluc, K, Na, BUN)            (Z23) Need for zoster vaccination  Comment:   Plan: ZOSTER VACCINE RECOMBINANT ADJUVANTED IM NJX            (Z13.220) Screening for lipid disorders  Comment:   Plan: Lipid panel reflex to direct LDL Fasting            (E55.9) Hypovitaminosis D  Comment: Taking Vit D 2000 IUs daily.   Plan: Vitamin D Deficiency            (Z11.59) Encounter for hepatitis C screening test for low risk patient  Comment:   Plan: Hepatitis C Screen Reflex to HCV RNA Quant and         Genotype              Patient has been advised of split  "billing requirements and indicates understanding: Yes    COUNSELING:  Reviewed preventive health counseling, as reflected in patient instructions  Special attention given to:        Regular exercise       Healthy diet/nutrition       Colorectal Cancer Screening       Consider Hep C screening for all patients one time for ages 18-79 years       Advance Care Planning    Estimated body mass index is 21.8 kg/m  as calculated from the following:    Height as of this encounter: 1.626 m (5' 4\").    Weight as of this encounter: 57.6 kg (127 lb).        She reports that she has never smoked. She has never used smokeless tobacco.      Counseling Resources:  ATP IV Guidelines  Pooled Cohorts Equation Calculator  Breast Cancer Risk Calculator  BRCA-Related Cancer Risk Assessment: FHS-7 Tool  FRAX Risk Assessment  ICSI Preventive Guidelines  Dietary Guidelines for Americans, 2010  USDA's MyPlate  ASA Prophylaxis  Lung CA Screening    Present and preformed physical exam with student nurse-family practice. The patient was evaluated and managed, by Lisbet Davis RN, SNP in collaboration with MANUEL Jenkins CNP supervising clinical preceptor.    Documentation written by MANUEL Jenkins CNP   [facility]  "

## 2022-02-04 ENCOUNTER — LAB (OUTPATIENT)
Dept: LAB | Facility: CLINIC | Age: 52
End: 2022-02-04
Payer: COMMERCIAL

## 2022-02-04 DIAGNOSIS — Z13.220 SCREENING FOR LIPID DISORDERS: ICD-10-CM

## 2022-02-04 DIAGNOSIS — E55.9 HYPOVITAMINOSIS D: ICD-10-CM

## 2022-02-04 DIAGNOSIS — Z11.59 ENCOUNTER FOR HEPATITIS C SCREENING TEST FOR LOW RISK PATIENT: ICD-10-CM

## 2022-02-04 DIAGNOSIS — I10 BENIGN ESSENTIAL HYPERTENSION: ICD-10-CM

## 2022-02-04 LAB
ANION GAP SERPL CALCULATED.3IONS-SCNC: 4 MMOL/L (ref 3–14)
BUN SERPL-MCNC: 13 MG/DL (ref 7–30)
CALCIUM SERPL-MCNC: 9.7 MG/DL (ref 8.5–10.1)
CHLORIDE BLD-SCNC: 107 MMOL/L (ref 94–109)
CHOLEST SERPL-MCNC: 208 MG/DL
CO2 SERPL-SCNC: 28 MMOL/L (ref 20–32)
CREAT SERPL-MCNC: 0.69 MG/DL (ref 0.52–1.04)
DEPRECATED CALCIDIOL+CALCIFEROL SERPL-MC: 57 UG/L (ref 20–75)
FASTING STATUS PATIENT QL REPORTED: YES
GFR SERPL CREATININE-BSD FRML MDRD: >90 ML/MIN/1.73M2
GLUCOSE BLD-MCNC: 94 MG/DL (ref 70–99)
HCV AB SERPL QL IA: NONREACTIVE
HDLC SERPL-MCNC: 72 MG/DL
LDLC SERPL CALC-MCNC: 123 MG/DL
NONHDLC SERPL-MCNC: 136 MG/DL
POTASSIUM BLD-SCNC: 4 MMOL/L (ref 3.4–5.3)
SODIUM SERPL-SCNC: 139 MMOL/L (ref 133–144)
TRIGL SERPL-MCNC: 63 MG/DL

## 2022-02-04 PROCEDURE — 80061 LIPID PANEL: CPT

## 2022-02-04 PROCEDURE — 80048 BASIC METABOLIC PNL TOTAL CA: CPT

## 2022-02-04 PROCEDURE — 86803 HEPATITIS C AB TEST: CPT

## 2022-02-04 PROCEDURE — 36415 COLL VENOUS BLD VENIPUNCTURE: CPT

## 2022-02-04 PROCEDURE — 82306 VITAMIN D 25 HYDROXY: CPT

## 2022-02-08 NOTE — TELEPHONE ENCOUNTER
PRIOR AUTHORIZATION DENIED    Medication: diclofenac (VOLTAREN) 1 % topical gel    Denial Date: 2/8/2022    Denial Rationale: Medication is not covered under pharmacy benefit- excluded from coverage. Patient will have to pay out of pocket or use coupon/discount card (also available OTC).          Appeal Information: If provider would like to appeal this decision we will need a detailed letter of medical necessity to start the process. Then re-route this request back to the PA pool.

## 2022-02-08 NOTE — TELEPHONE ENCOUNTER
PA Initiation    Medication: diclofenac (VOLTAREN) 1 % topical gel  Insurance Company: [employer] - Phone [phone] Fax [phone]  Pharmacy Filling the Rx: [id number] [place] [id number] [facility] [address]  Filling Pharmacy Phone: [phone]  Filling Pharmacy Fax: [phone]  Start Date: 2/8/2022

## 2022-02-23 ENCOUNTER — HOSPITAL ENCOUNTER (OUTPATIENT)
Dept: GENERAL RADIOLOGY | Facility: CLINIC | Age: 52
Discharge: HOME | End: 2022-02-23
Attending: NURSE PRACTITIONER | Admitting: NURSE PRACTITIONER
Payer: COMMERCIAL

## 2022-02-23 DIAGNOSIS — M54.2 CERVICALGIA: ICD-10-CM

## 2022-02-23 PROCEDURE — 72040 X-RAY EXAM NECK SPINE 2-3 VW: CPT | Mod: 26 | Performed by: STUDENT IN AN ORGANIZED HEALTH CARE EDUCATION/TRAINING PROGRAM

## 2022-02-23 PROCEDURE — 72040 X-RAY EXAM NECK SPINE 2-3 VW: CPT

## 2022-02-25 ENCOUNTER — MYC MEDICAL ADVICE (OUTPATIENT)
Dept: FAMILY MEDICINE | Facility: CLINIC | Age: 52
End: 2022-02-25
Payer: COMMERCIAL

## 2022-02-25 DIAGNOSIS — M26.609 TMJ (TEMPOROMANDIBULAR JOINT SYNDROME): ICD-10-CM

## 2022-02-25 DIAGNOSIS — M25.511 ACUTE PAIN OF BOTH SHOULDERS: ICD-10-CM

## 2022-02-25 DIAGNOSIS — M54.2 CERVICALGIA: Primary | ICD-10-CM

## 2022-02-25 DIAGNOSIS — M25.512 ACUTE PAIN OF BOTH SHOULDERS: ICD-10-CM

## 2022-03-02 RX ORDER — LORAZEPAM 1 MG/1
.5-1 TABLET ORAL EVERY 6 HOURS PRN
Qty: 1 TABLET | Refills: 0 | Status: SHIPPED | OUTPATIENT
Start: 2022-03-02 | End: 2023-02-20

## 2022-03-17 DIAGNOSIS — M54.2 CERVICALGIA: ICD-10-CM

## 2022-03-17 NOTE — TELEPHONE ENCOUNTER
"Requested Prescriptions   Signed Prescriptions Disp Refills    diclofenac (VOLTAREN) 1 % topical gel 350 g 1     Sig: Apply 2 g topically 4 times daily       Topical Steroids and Nonsteroidals Protocol Passed - 3/17/2022  3:38 PM        Passed - Patient is age 6 or older        Passed - Authorizing prescriber's most recent note related to this medication read.     If refill request is for ophthalmic use, please forward request to provider for approval.          Passed - High potency steroid not ordered        Passed - Recent (12 mo) or future (30 days) visit within the authorizing provider's specialty     Patient has had an office visit with the authorizing provider or a provider within the authorizing providers department within the previous 12 mos or has a future within next 30 days. See \"Patient Info\" tab in inbasket, or \"Choose Columns\" in Meds & Orders section of the refill encounter.              Passed - Medication is active on med list           Raquel Cruz RN  [facility]     "

## 2022-03-17 NOTE — TELEPHONE ENCOUNTER
Reason for Call:  Medication or medication refill:    Do you use a [facility]?  Name of the pharmacy and phone number for the current request:  [facility] #[id number] - [place], [place] - [facility] [address] AT [address]    Name of the medication requested: diclofenac (VOLTAREN) 1 % topical gel    Other request: N/A    Can we leave a detailed message on this number? YES    Phone number patient can be reached at: Home number on file [phone] (home)    Best Time: Any    Call taken on 3/17/2022 at 3:38 PM by Milena Santana

## 2022-04-16 ENCOUNTER — PATIENT OUTREACH (OUTPATIENT)
Dept: DERMATOLOGY | Facility: CLINIC | Age: 52
End: 2022-04-16
Payer: COMMERCIAL

## 2022-04-16 NOTE — CONFIDENTIAL NOTE
Attempted to reach patient to schedule follow up in the Dermatology Clinic.  No answer,  LM on VM to call office and Letter mailed.    Schedule with Dr. Luz Figueroa.

## 2022-04-16 NOTE — LETTER
April 16, 2022          Gerardo Mandujano  [address] [facility] [address]        Dear Gerardo Mandujano:    We recently reviewed your medical records from [facility] and found that you are due for an appointment with Dr. Luz Figueroa.  Our office has attempted to reach you via telephone and left a message.    At your earliest convenience, please call the clinic at [phone] to arrange the recommended exam and possible testing.  Please kindly mention this letter when calling so that your appointment(s) can be accurately scheduled.      Sincerely,       The Clinic Staff        Medical Record Number:  [id number]

## 2022-05-11 ENCOUNTER — THERAPY VISIT (OUTPATIENT)
Dept: PHYSICAL THERAPY | Facility: CLINIC | Age: 52
End: 2022-05-11
Attending: NURSE PRACTITIONER
Payer: COMMERCIAL

## 2022-05-11 DIAGNOSIS — M54.2 CERVICALGIA: ICD-10-CM

## 2022-05-11 DIAGNOSIS — M25.512 ACUTE PAIN OF BOTH SHOULDERS: ICD-10-CM

## 2022-05-11 DIAGNOSIS — M25.511 ACUTE PAIN OF BOTH SHOULDERS: ICD-10-CM

## 2022-05-11 PROCEDURE — 97161 PT EVAL LOW COMPLEX 20 MIN: CPT | Mod: GP | Performed by: PHYSICAL THERAPIST

## 2022-05-11 PROCEDURE — 97110 THERAPEUTIC EXERCISES: CPT | Mod: GP | Performed by: PHYSICAL THERAPIST

## 2022-05-11 PROCEDURE — 97530 THERAPEUTIC ACTIVITIES: CPT | Mod: GP | Performed by: PHYSICAL THERAPIST

## 2022-05-11 NOTE — PROGRESS NOTES
Physical Therapy Initial Evaluation  Subjective:  The history is provided by the patient.   Therapist Generated HPI Evaluation  Problem details: MD order 2/25/22  Pt reports chronic neck and shoulder pain for years. L>R side. Feels the last 3-6 months, sleeping has been worse. Working on computer and driving hurts. Movement can help unless she is in a lot of pain, then it hurts.  Does do pilates and yoga - this can sometimes worsen pain depending on positions. Heat helps.   Does see chiro but it has been a few months.   Has tried some cranial sacral therapy and massage.     Also reports issues with tension headaches about 2-3x/week. Starts in base of neck and goes up into sides of head or front of head.    .         Type of problem:  Cervical spine.    This is a chronic condition.  Condition occurred with:  Insidious onset.  Where condition occurred: for unknown reasons.  Patient reports pain:  Cervical left side, cervical right side, mid cervical spine and lower cervical spine (L>R).  Pain is described as aching and is intermittent.  Pain radiates to:  Head, shoulder left and shoulder right. Pain is worse in the P.M..  Progression since onset: recently worsening with sleep.  Associated symptoms:  Headache and loss of motion/stiffness. Symptoms are exacerbated by other (sleeping/laying down, driving, working on computer)  and relieved by other, heat and NSAID's (light stretching, yoga, pilates).  Special tests included:  X-ray.  Previous treatment includes chiropractic and other (massage, cranial sacral therapy). There was moderate improvement following previous treatment.      Patient Health History  Gerardo Mandujano being seen for Neck evaluation.     Date of Onset: MD order 2/25/22.   Problem occurred: Chronic   Pain is reported as 3/10 (up to 5/10) on pain scale.  General health as reported by patient is good.  Pertinent medical history includes: high blood pressure.     Medical allergies: other. Other medical  allergies details: Surgical scrub.   Surgeries include:  Other. Other surgery history details: Hysterectomy, fibroadenoma.    Current medications:  High blood pressure medication.       Primary job tasks include:  Computer work and lifting/carrying.                                    Objective:  System              Cervical/Thoracic Evaluation    AROM:  AROM Cervical:    Flexion:            WNL - NE  Extension:       Min limit - NE  Rotation:         Left: min limit - L soreness     Right: min limit - R tightness, L pulling  Side Bend:      Left: min limit - NE     Right:  Mod limit - L tightness and different on right    Strength: mid trap: L 4/5, R 4+/5. Low trap: L 4-/5, R 4/5  Headaches: cervical          Cervical Palpation:  : incr tone and tenderness L>R UT and levator.          Spinal Segmental Conclusions:    Level:  Hypo at T1, T2, T3 and T4             Shoulder Evaluation:  ROM:  AROM:    Flexion:  Left:  WNL    Right:  WNL    Abduction:  Left: WNL   Right:  WNL      External Rotation:  Left:  WNL    Right:  WNL                                                                 Marilynn Cervical Evaluation    Posture:  Sitting: fair              Test Movements:      RET: During: no effect  After: no effect    Repeat RET: During: no effect  After: better  Mechanical Response: IncROM  RET EXT: During: no effect  After: no effect    Repeat RET EXT: During: no effect  After: no effect  Mechanical Response: no effect                                                                   ROS    Assessment/Plan:    Patient is a 51 year old female with cervical complaints.    Patient has the following significant findings with corresponding treatment plan.                Diagnosis 1:  Neck pain and headaches    Pain -  manual therapy, self management, education, directional preference exercise and home program  Decreased ROM/flexibility - manual therapy, therapeutic exercise and home program  Decreased joint mobility -  manual therapy, therapeutic exercise and home program  Decreased strength - therapeutic exercise, therapeutic activities and home program  Impaired muscle performance - neuro re-education and home program  Decreased function - therapeutic activities and home program  Impaired posture - neuro re-education, therapeutic activities and home program    Therapy Evaluation Codes:   1) History comprised of:   Personal factors that impact the plan of care:      None.    Comorbidity factors that impact the plan of care are:      None.     Medications impacting care: Anti-inflammatory.  2) Examination of Body Systems comprised of:   Body structures and functions that impact the plan of care:      Cervical spine.   Activity limitations that impact the plan of care are:      Driving, Reading/Computer work, Sleeping and Laying down.  3) Clinical presentation characteristics are:   Stable/Uncomplicated.  4) Decision-Making    Low complexity using standardized patient assessment instrument and/or measureable assessment of functional outcome.  Cumulative Therapy Evaluation is: Low complexity.    Previous and current functional limitations:  (See Goal Flow Sheet for this information)    Short term and Long term goals: (See Goal Flow Sheet for this information)     Communication ability:  Patient appears to be able to clearly communicate and understand verbal and written communication and follow directions correctly.  Treatment Explanation - The following has been discussed with the patient:   RX ordered/plan of care  Anticipated outcomes  Possible risks and side effects  This patient would benefit from PT intervention to resume normal activities.   Rehab potential is good.    Frequency:  1 X week, once daily  Duration:  for 6 weeks  Discharge Plan:  Achieve all LTG.  Independent in home treatment program.  Reach maximal therapeutic benefit.    Please refer to the daily flowsheet for treatment today, total treatment time and time  spent performing 1:1 timed codes.

## 2022-07-18 ENCOUNTER — THERAPY VISIT (OUTPATIENT)
Dept: PHYSICAL THERAPY | Facility: CLINIC | Age: 52
End: 2022-07-18
Payer: COMMERCIAL

## 2022-07-18 DIAGNOSIS — M54.2 NECK PAIN: Primary | ICD-10-CM

## 2022-07-18 PROCEDURE — 97110 THERAPEUTIC EXERCISES: CPT | Mod: GP | Performed by: PHYSICAL THERAPIST

## 2022-07-18 PROCEDURE — 97161 PT EVAL LOW COMPLEX 20 MIN: CPT | Mod: GP | Performed by: PHYSICAL THERAPIST

## 2022-07-18 NOTE — PROGRESS NOTES
Physical Therapy Initial Evaluation  Subjective:  HPI                  Physical Therapy Initial Examination/Evaluation  July 18, 2022    Gerardo Mandujano is a 51 year old female referred to physical therapy by LUIS Goyal for treatment of right shoulder and neck with Precautions/Restrictions/MD instructions none  Increase in sxs over the last year, especially with laying on shoulders.  Pain is achy in nature.  Sxs can be SAVAGE and normally it is more left sided.  Does virtual workouts-3 times a week (pilates, low impact cardio, yoga).  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, wife has arthritis in knees and has been doing a bigger role around the house    Subjective:  DOI/onset: 7/18/21   Acute Injury or Gradual Onset?: gradual  Mechanism of Injury: insidious  Related PMH: persistent neck and back sxs Previous Treatment: Heat and meds   Imaging: x-ray  Chief Complaint/Functional Limitations:   Sleeping on shoulder, pushing an object, walking dog-tug motions and see below in therapy evaluation codes   Pain: rest 0 /10, activity 3/10 Location: right>left UT Frequency: Intermittent Described as: aching Alleviated by: changing positions Progression of Symptoms: similar sxs over the past two months Time of day when pain is worse: Activity related and Position related  Sleeping: Interrupted due to current issue   Occupation:     Current HEP/exercise regimen: see above  Patient's goals are see chief complaints eliminate pain    Other pertinent PMH/Red Flags: None   Barriers at home/work: None as reported by patient  Pertinent Surgical History: none  Medications: None as reported by patient  General health as reported by patient: good  Return to MD:  unknown      Objective:  System  Cervical AROM  Flex 100%, ext 100% mild discomfort  Lat flex 75% SAVAGE  Rotation 100% and no sxs SAVAGE  Chin tuck no change in sxs  Resisted  motions in neutral 5/5 flex, ext, lat flex 4+/5 SAVAGE  Shoulder AROM is full with all motions  Pain with IR up the back      Physical Exam    General     ROS    Assessment/Plan:    Patient is a 51 year old female with cervical and right side shoulder complaints.    Patient has the following significant findings with corresponding treatment plan.                Diagnosis 1:  UT strain, postural strain  Pain -  manual therapy, self management, education, directional preference exercise and home program  Decreased ROM/flexibility - manual therapy and therapeutic exercise  Decreased strength - therapeutic exercise and therapeutic activities  Decreased function - therapeutic activities    Therapy Evaluation Codes:   1) History comprised of:   Personal factors that impact the plan of care:      None.    Comorbidity factors that impact the plan of care are:      None.     Medications impacting care: None.  2) Examination of Body Systems comprised of:   Body structures and functions that impact the plan of care:      Cervical spine and Shoulder.   Activity limitations that impact the plan of care are:      Driving and Sleeping.  3) Clinical presentation characteristics are:   Stable/Uncomplicated.  4) Decision-Making    Low complexity using standardized patient assessment instrument and/or measureable assessment of functional outcome.  Cumulative Therapy Evaluation is: Low complexity.    Previous and current functional limitations:  (See Goal Flow Sheet for this information)    Short term and Long term goals: (See Goal Flow Sheet for this information)     Communication ability:  Patient appears to be able to clearly communicate and understand verbal and written communication and follow directions correctly.  Treatment Explanation - The following has been discussed with the patient:   RX ordered/plan of care  Anticipated outcomes  Possible risks and side effects  This patient would benefit from PT intervention to resume normal  activities.   Rehab potential is good.    Frequency:  1 X week, once daily  Duration:  for 8 weeks  Discharge Plan:  Achieve all LTG.  Independent in home treatment program.  Reach maximal therapeutic benefit.    Please refer to the daily flowsheet for treatment today, total treatment time and time spent performing 1:1 timed codes.

## 2022-07-26 DIAGNOSIS — I10 BENIGN ESSENTIAL HYPERTENSION: Primary | ICD-10-CM

## 2022-07-26 RX ORDER — LISINOPRIL 10 MG/1
10 TABLET ORAL DAILY
Qty: 90 TABLET | Refills: 1 | Status: SHIPPED | OUTPATIENT
Start: 2022-07-26 | End: 2023-02-23

## 2022-07-26 NOTE — TELEPHONE ENCOUNTER
"Requested Prescriptions   Pending Prescriptions Disp Refills     lisinopril (ZESTRIL) 10 MG tablet 90 tablet 1     Sig: Take 1 tablet (10 mg) by mouth daily       ACE Inhibitors (Including Combos) Protocol Passed - 7/26/2022  9:27 AM        Passed - Blood pressure under 140/90 in past 12 months     BP Readings from Last 3 Encounters:   02/01/22 101/71   11/19/21 108/70   01/15/21 100/80                 Passed - Recent (12 mo) or future (30 days) visit within the authorizing provider's specialty     Patient has had an office visit with the authorizing provider or a provider within the authorizing providers department within the previous 12 mos or has a future within next 30 days. See \"Patient Info\" tab in inbasket, or \"Choose Columns\" in Meds & Orders section of the refill encounter.              Passed - Medication is active on med list        Passed - Patient is age 18 or older        Passed - No active pregnancy on record        Passed - Normal serum creatinine on file in past 12 months     Recent Labs   Lab Test 02/04/22  0725   CR 0.69       Ok to refill medication if creatinine is low          Passed - Normal serum potassium on file in past 12 months     Recent Labs   Lab Test 02/04/22  0725   POTASSIUM 4.0             Passed - No positive pregnancy test within past 12 months           Refilled per protocol.  Flor HOLCOMB RN    "

## 2022-09-09 ENCOUNTER — THERAPY VISIT (OUTPATIENT)
Dept: PHYSICAL THERAPY | Facility: CLINIC | Age: 52
End: 2022-09-09
Payer: COMMERCIAL

## 2022-09-09 DIAGNOSIS — M54.2 NECK PAIN: Primary | ICD-10-CM

## 2022-09-09 PROCEDURE — 97110 THERAPEUTIC EXERCISES: CPT | Mod: GP | Performed by: PHYSICAL THERAPIST

## 2022-09-09 PROCEDURE — 97140 MANUAL THERAPY 1/> REGIONS: CPT | Mod: GP | Performed by: PHYSICAL THERAPIST

## 2022-09-09 NOTE — PROGRESS NOTES
S:  Similar sxs as last visit.  Consistently doing exercises that are not on stomach.  O:  incr tone SAVAGE UT and SCM  25% deficits and tightness with SB motions  A: Overall no significant change will benefit from cont skilled PT 1 time every 2 weeks for 3 additional visits to progress POC.  P: follow up in 2 weeks.

## 2022-09-26 ENCOUNTER — THERAPY VISIT (OUTPATIENT)
Dept: PHYSICAL THERAPY | Facility: CLINIC | Age: 52
End: 2022-09-26
Payer: COMMERCIAL

## 2022-09-26 DIAGNOSIS — S13.9XXA NECK SPRAIN: Primary | ICD-10-CM

## 2022-09-26 PROCEDURE — 97110 THERAPEUTIC EXERCISES: CPT | Mod: GP | Performed by: PHYSICAL THERAPIST

## 2022-09-26 PROCEDURE — 97140 MANUAL THERAPY 1/> REGIONS: CPT | Mod: GP | Performed by: PHYSICAL THERAPIST

## 2022-12-06 ENCOUNTER — ANCILLARY PROCEDURE (OUTPATIENT)
Dept: MAMMOGRAPHY | Facility: CLINIC | Age: 52
End: 2022-12-06
Payer: COMMERCIAL

## 2022-12-06 DIAGNOSIS — Z12.31 VISIT FOR SCREENING MAMMOGRAM: ICD-10-CM

## 2022-12-06 PROCEDURE — 77067 SCR MAMMO BI INCL CAD: CPT | Mod: GC | Performed by: STUDENT IN AN ORGANIZED HEALTH CARE EDUCATION/TRAINING PROGRAM

## 2022-12-06 PROCEDURE — 77063 BREAST TOMOSYNTHESIS BI: CPT | Mod: GC | Performed by: STUDENT IN AN ORGANIZED HEALTH CARE EDUCATION/TRAINING PROGRAM

## 2023-02-16 NOTE — PROGRESS NOTES
[facility] Dermatology Note  Encounter Date: Feb 17, 2023  Office Visit     Dermatology Problem List:  1. Family history melanoma (father).  2. Acrochordon, right posterior thigh, s/p biopsy 7/16/21    ____________________________________________    Assessment & Plan:     # Multiple benign nevi.   # Fhx melanoma.  - No concerning lesions today --> continue to monitor nevus on L forearm, R inner thigh  - Monitor for ABCDEs of melanoma   - Continue sun protection - recommend SPF 30 or higher with frequent application   - Return sooner if noticing changing or symptomatic lesions    # Benign lesions - SKs, cherry angiomas, lentigenes.  - No treatment required    Procedures Performed:   none    Follow-up: 1 year(s) in-person, or earlier for new or changing lesions    Seen and staffed with Dr. Carolina Stallings MD  Internal Medicine, PGY-3    Staff Physician Comments:   I saw and evaluated the patient with the resident and I agree with the assessment and plan.  I was present for the examination.    Luz Figueroa MD    Department of Dermatology  [facility]: Phone: [phone], Fax: [phone]  2/20/2023     ____________________________________________    CC: Skin Check (No spots of concern)    HPI:  Ms. Gerardo Mandujano is a(n) 52 year old female who presents today as a return patient for full body. Last seen by me on 7/16/2021, at which time patient underwent shave biopsy for NUB on the right posterior thigh which returned as acrochordon.    Today, she reports no new lesions of concern. Has been applying sun screen frequently when going outside. No rashes or variation in lesions seen at last visit.     Patient is otherwise feeling well, without additional skin concerns.    Labs Reviewed:  n/a    Physical Exam:  Vitals: none  SKIN: Total skin excluding the undergarment areas was performed. The exam  included the head/face, neck, both arms, chest, back, abdomen, both legs, digits and/or nails.   - L forearm 3 mm light/medium brown macule with dermoscopy showing evenly reticulated network with lighter pigment around hair follicle  - Left upper shin somewhat of a triangular shaped light-medium brown macule, dermoscopy faintly reticulated pigment   - Right inner thigh approximately 4 mm even light brown macule superiorly with slight pedicle, dermoscopy very evenly reticulated. Unchanged from prior.  - Multiple regular brown pigmented macules and papules are identified on the trunk and extremities.   - There are waxy stuck on tan to brown papules on the trunk, extremities, and scalp.   - red macules/papules on trunk and extremities.   - No other lesions of concern on areas examined.     Medications:  Current Outpatient Medications   Medication     calcium citrate (CITRACAL) 950 (200 Ca) MG tablet     diclofenac (VOLTAREN) 1 % topical gel     FERROUS GLUCONATE     ibuprofen 200 MG capsule     lisinopril (ZESTRIL) 10 MG tablet     magnesium 250 MG tablet     VITAMIN D, CHOLECALCIFEROL, PO     zinc gluconate 50 MG tablet     gabapentin (NEURONTIN) 100 MG capsule     LORazepam (ATIVAN) 1 MG tablet     No current facility-administered medications for this visit.      Past Medical History:   Patient Active Problem List   Diagnosis     CARDIOVASCULAR SCREENING; LDL GOAL LESS THAN 160     Headache     Spasm of muscle     Arthralgia of temporomandibular joint     Benign essential hypertension     Past Medical History:   Diagnosis Date     Allergies     Seasonal     Tension headache         CC Referred Self, MD  No address on file on close of this encounter.

## 2023-02-17 ENCOUNTER — OFFICE VISIT (OUTPATIENT)
Dept: DERMATOLOGY | Facility: CLINIC | Age: 53
End: 2023-02-17
Payer: COMMERCIAL

## 2023-02-17 DIAGNOSIS — D18.01 CHERRY ANGIOMA: ICD-10-CM

## 2023-02-17 DIAGNOSIS — D22.9 MULTIPLE BENIGN NEVI: Primary | ICD-10-CM

## 2023-02-17 DIAGNOSIS — L82.1 SEBORRHEIC KERATOSIS: ICD-10-CM

## 2023-02-17 DIAGNOSIS — Z80.8 FAMILY HISTORY OF MELANOMA: ICD-10-CM

## 2023-02-17 PROCEDURE — 99213 OFFICE O/P EST LOW 20 MIN: CPT | Mod: GC | Performed by: DERMATOLOGY

## 2023-02-17 NOTE — NURSING NOTE
Dermatology Rooming Note    Gerardo Mandujano's goals for this visit include:   Chief Complaint   Patient presents with     Skin Check     No spots of concern     April Prescott, CMA

## 2023-02-17 NOTE — LETTER
2/17/2023       RE: Gerardo Mandujano  [address]  [facility] [address]       Dear Colleague,    Thank you for referring your patient, Gerardo Mandujano, to the [facility]ERMATOLOG[facility] [place] at [facility]. Please see a copy of my visit note below.    [facility] Dermatology Note  Encounter Date: Feb 17, 2023  Office Visit     Dermatology Problem List:  1. Family history melanoma (father).  2. Acrochordon, right posterior thigh, s/p biopsy 7/16/21    ____________________________________________    Assessment & Plan:     # Multiple benign nevi.   # Fhx melanoma.  - No concerning lesions today --> continue to monitor nevus on L forearm, R inner thigh  - Monitor for ABCDEs of melanoma   - Continue sun protection - recommend SPF 30 or higher with frequent application   - Return sooner if noticing changing or symptomatic lesions    # Benign lesions - SKs, cherry angiomas, lentigenes.  - No treatment required    Procedures Performed:   none    Follow-up: 1 year(s) in-person, or earlier for new or changing lesions    Seen and staffed with Dr. Carolina Stallings MD  Internal Medicine, PGY-3    Staff Physician Comments:   I saw and evaluated the patient with the resident and I agree with the assessment and plan.  I was present for the examination.    Luz Figueroa MD    Department of Dermatology  [facility]: Phone: [phone], Fax: [phone]  2/20/2023     ____________________________________________    CC: Skin Check (No spots of concern)    HPI:  Ms. Gerardo Mandujano is a(n) 52 year old female who presents today as a return patient for full body. Last seen by me on 7/16/2021, at which time patient underwent shave biopsy for NUB on the right posterior thigh which returned as acrochordon.    Today, she reports no new lesions of concern.  Has been applying sun screen frequently when going outside. No rashes or variation in lesions seen at last visit.     Patient is otherwise feeling well, without additional skin concerns.    Labs Reviewed:  n/a    Physical Exam:  Vitals: none  SKIN: Total skin excluding the undergarment areas was performed. The exam included the head/face, neck, both arms, chest, back, abdomen, both legs, digits and/or nails.   - L forearm 3 mm light/medium brown macule with dermoscopy showing evenly reticulated network with lighter pigment around hair follicle  - Left upper shin somewhat of a triangular shaped light-medium brown macule, dermoscopy faintly reticulated pigment   - Right inner thigh approximately 4 mm even light brown macule superiorly with slight pedicle, dermoscopy very evenly reticulated. Unchanged from prior.  - Multiple regular brown pigmented macules and papules are identified on the trunk and extremities.   - There are waxy stuck on tan to brown papules on the trunk, extremities, and scalp.   - red macules/papules on trunk and extremities.   - No other lesions of concern on areas examined.     Medications:  Current Outpatient Medications   Medication     calcium citrate (CITRACAL) 950 (200 Ca) MG tablet     diclofenac (VOLTAREN) 1 % topical gel     FERROUS GLUCONATE     ibuprofen 200 MG capsule     lisinopril (ZESTRIL) 10 MG tablet     magnesium 250 MG tablet     VITAMIN D, CHOLECALCIFEROL, PO     zinc gluconate 50 MG tablet     gabapentin (NEURONTIN) 100 MG capsule     LORazepam (ATIVAN) 1 MG tablet     No current facility-administered medications for this visit.      Past Medical History:   Patient Active Problem List   Diagnosis     CARDIOVASCULAR SCREENING; LDL GOAL LESS THAN 160     Headache     Spasm of muscle     Arthralgia of temporomandibular joint     Benign essential hypertension     Past Medical History:   Diagnosis Date     Allergies     Seasonal     Tension headache             Again, thank you for  allowing me to participate in the care of your patient.      Sincerely,    Luz Figueroa MD

## 2023-02-21 NOTE — PATIENT INSTRUCTIONS
Patient Education     Checking for Skin Cancer  You can find cancer early by checking your skin each month. There are 3 kinds of skin cancer. They are melanoma, basal cell carcinoma, and squamous cell carcinoma. Doing monthly skin checks is the best way to find new marks or skin changes. Follow the instructions below for checking your skin.   The ABCDEs of checking moles for melanoma   Check your moles or growths for signs of melanoma using ABCDE:   Asymmetry: the sides of the mole or growth don t match  Border: the edges are ragged, notched, or blurred  Color: the color within the mole or growth varies  Diameter: the mole or growth is larger than 6 mm (size of a pencil eraser)  Evolving: the size, shape, or color of the mole or growth is changing (evolving is not shown in the images below)    Checking for other types of skin cancer  Basal cell carcinoma or squamous cell carcinoma have symptoms such as:     A spot or mole that looks different from all other marks on your skin  Changes in how an area feels, such as itching, tenderness, or pain  Changes in the skin's surface, such as oozing, bleeding, or scaliness  A sore that does not heal  New swelling or redness beyond the border of a mole    Who s at risk?  Anyone can get skin cancer. But you are at greater risk if you have:   Fair skin, light-colored hair, or light-colored eyes  Many moles or abnormal moles on your skin  A history of sunburns from sunlight or tanning beds  A family history of skin cancer  A history of exposure to radiation or chemicals  A weakened immune system  If you have had skin cancer in the past, you are at risk for recurring skin cancer.   How to check your skin  Do your monthly skin checkups in front of a full-length mirror. Check all parts of your body, including your:   Head (ears, face, neck, and scalp)  Torso (front, back, and sides)  Arms (tops, undersides, upper, and lower armpits)  Hands (palms, backs, and fingers, including  under the nails)  Buttocks and genitals  Legs (front, back, and sides)  Feet (tops, soles, toes, including under the nails, and between toes)  If you have a lot of moles, take digital photos of them each month. Make sure to take photos both up close and from a distance. These can help you see if any moles change over time.   Most skin changes are not cancer. But if you see any changes in your skin, call your doctor right away. Only he or she can diagnose a problem. If you have skin cancer, seeing your doctor can be the first step toward getting the treatment that could save your life.   [employer] last reviewed this educational content on 4/1/2019 2000-2020 [employer]. [address] [place] [address], [place] [address]. All rights reserved. This information is not intended as a substitute for professional medical care. Always follow your healthcare professional's instructions.       When should I call my doctor?  If you are worsening or not improving, please, contact us or seek urgent care as noted below.     Who should I call with questions (adults)?  [facility] (adult and pediatric): [phone]  [facility] (adult): [phone]  For urgent needs outside of business hours call the [facility] at [phone] and ask for the dermatology resident on call to be paged  If this is a medical emergency and you are unable to reach an ER, Call [phone]    Who should I call with questions (pediatric)?  [facility]- Pediatric Dermatology  Dr. Mable Shelton, Dr. Miguel Shay, Dr. Tenisha Salgado, MARTIN [place], Dr. Olinda Monet, Dr. Izzy Chávez & Dr. Luis Pedraza  Non-urgent nurse triage line; [phone]- Janell and Parul RUFFIN Care Coordinatoralesia Butcher (/Complex ) [phone]    If you need a prescription refill, please contact your pharmacy. Refills are approved or denied by our  Physicians during normal business hours, Monday through Fridays  Per office policy, refills will not be granted if you have not been seen within the past year (or sooner depending on your child's condition)    Scheduling Information:  Pediatric Appointment Scheduling and Call Center [phone]  Radiology Scheduling- [phone]  Sedation Unit Scheduling- [phone]  [place] Scheduling- General [phone]; Pediatric Dermatology [phone]  Main  Services: [phone]  Arabic: [phone]  Dominican: [phone]  Hmong/Swiss/Indonesian: [phone]  Preadmission Nursing Department Fax Number: [phone] (Fax all pre-operative paperwork to this number)    For urgent matters arising during evenings, weekends, or holidays that cannot wait for normal business hours please call [phone] and ask for the dermatology resident on call to be paged.

## 2023-02-22 DIAGNOSIS — I10 BENIGN ESSENTIAL HYPERTENSION: ICD-10-CM

## 2023-02-22 NOTE — TELEPHONE ENCOUNTER
"Requested Prescriptions   Pending Prescriptions Disp Refills     lisinopril (ZESTRIL) 10 MG tablet 90 tablet 1     Sig: Take 1 tablet (10 mg) by mouth daily       ACE Inhibitors (Including Combos) Protocol Failed - 2/22/2023  1:31 PM        Failed - Blood pressure under 140/90 in past 12 months     BP Readings from Last 3 Encounters:   02/01/22 101/71   11/19/21 108/70   01/15/21 100/80                 Failed - Recent (12 mo) or future (30 days) visit within the authorizing provider's specialty     Patient has had an office visit with the authorizing provider or a provider within the authorizing providers department within the previous 12 mos or has a future within next 30 days. See \"Patient Info\" tab in inbasket, or \"Choose Columns\" in Meds & Orders section of the refill encounter.              Failed - Normal serum creatinine on file in past 12 months     Recent Labs   Lab Test 02/04/22  0725   CR 0.69       Ok to refill medication if creatinine is low          Failed - Normal serum potassium on file in past 12 months     Recent Labs   Lab Test 02/04/22  0725   POTASSIUM 4.0             Passed - Medication is active on med list        Passed - Patient is age 18 or older        Passed - No active pregnancy on record        Passed - No positive pregnancy test within past 12 months         Routing refill request to provider for review/approval because medication did not pass protocol.    Pt has a appointment on 03/07/23    Xiomara Morrissey RN  [facility]   "

## 2023-02-23 RX ORDER — LISINOPRIL 10 MG/1
10 TABLET ORAL DAILY
Qty: 90 TABLET | Refills: 1 | Status: SHIPPED | OUTPATIENT
Start: 2023-02-23 | End: 2023-03-15

## 2023-03-15 ENCOUNTER — OFFICE VISIT (OUTPATIENT)
Dept: FAMILY MEDICINE | Facility: CLINIC | Age: 53
End: 2023-03-15
Payer: COMMERCIAL

## 2023-03-15 ENCOUNTER — APPOINTMENT (OUTPATIENT)
Dept: LAB | Facility: CLINIC | Age: 53
End: 2023-03-15
Payer: COMMERCIAL

## 2023-03-15 VITALS
SYSTOLIC BLOOD PRESSURE: 106 MMHG | TEMPERATURE: 98.3 F | HEIGHT: 63 IN | OXYGEN SATURATION: 97 % | DIASTOLIC BLOOD PRESSURE: 70 MMHG | RESPIRATION RATE: 16 BRPM | HEART RATE: 72 BPM | BODY MASS INDEX: 22.41 KG/M2 | WEIGHT: 126.5 LBS

## 2023-03-15 DIAGNOSIS — I10 BENIGN ESSENTIAL HYPERTENSION: ICD-10-CM

## 2023-03-15 DIAGNOSIS — Z00.00 ROUTINE GENERAL MEDICAL EXAMINATION AT A HEALTH CARE FACILITY: Primary | ICD-10-CM

## 2023-03-15 DIAGNOSIS — N39.492 POSTURAL URINARY INCONTINENCE: ICD-10-CM

## 2023-03-15 DIAGNOSIS — Z13.220 LIPID SCREENING: ICD-10-CM

## 2023-03-15 LAB
ANION GAP SERPL CALCULATED.3IONS-SCNC: 11 MMOL/L (ref 7–15)
BUN SERPL-MCNC: 11.7 MG/DL (ref 6–20)
CALCIUM SERPL-MCNC: 9.9 MG/DL (ref 8.6–10)
CHLORIDE SERPL-SCNC: 103 MMOL/L (ref 98–107)
CHOLEST SERPL-MCNC: 230 MG/DL
CREAT SERPL-MCNC: 0.63 MG/DL (ref 0.51–0.95)
CREAT UR-MCNC: 48.8 MG/DL
DEPRECATED HCO3 PLAS-SCNC: 27 MMOL/L (ref 22–29)
GFR SERPL CREATININE-BSD FRML MDRD: >90 ML/MIN/1.73M2
GLUCOSE SERPL-MCNC: 98 MG/DL (ref 70–99)
HDLC SERPL-MCNC: 68 MG/DL
LDLC SERPL CALC-MCNC: 144 MG/DL
MICROALBUMIN UR-MCNC: <12 MG/L
MICROALBUMIN/CREAT UR: NORMAL MG/G{CREAT}
NONHDLC SERPL-MCNC: 162 MG/DL
POTASSIUM SERPL-SCNC: 4 MMOL/L (ref 3.4–5.3)
SODIUM SERPL-SCNC: 141 MMOL/L (ref 136–145)
TRIGL SERPL-MCNC: 92 MG/DL

## 2023-03-15 PROCEDURE — 99213 OFFICE O/P EST LOW 20 MIN: CPT | Mod: 25 | Performed by: NURSE PRACTITIONER

## 2023-03-15 PROCEDURE — 82043 UR ALBUMIN QUANTITATIVE: CPT | Performed by: NURSE PRACTITIONER

## 2023-03-15 PROCEDURE — 36415 COLL VENOUS BLD VENIPUNCTURE: CPT | Performed by: NURSE PRACTITIONER

## 2023-03-15 PROCEDURE — 80061 LIPID PANEL: CPT | Performed by: NURSE PRACTITIONER

## 2023-03-15 PROCEDURE — 82570 ASSAY OF URINE CREATININE: CPT | Performed by: NURSE PRACTITIONER

## 2023-03-15 PROCEDURE — 80048 BASIC METABOLIC PNL TOTAL CA: CPT | Performed by: NURSE PRACTITIONER

## 2023-03-15 PROCEDURE — 99396 PREV VISIT EST AGE 40-64: CPT | Performed by: NURSE PRACTITIONER

## 2023-03-15 RX ORDER — LISINOPRIL 10 MG/1
10 TABLET ORAL DAILY
Qty: 90 TABLET | Refills: 3 | Status: SHIPPED | OUTPATIENT
Start: 2023-03-15 | End: 2024-03-15

## 2023-03-15 ASSESSMENT — ENCOUNTER SYMPTOMS
HEMATOCHEZIA: 0
NAUSEA: 0
FEVER: 0
NERVOUS/ANXIOUS: 0
EYE PAIN: 0
MYALGIAS: 0
PARESTHESIAS: 0
HEMATURIA: 0
CHILLS: 0
JOINT SWELLING: 0
DIARRHEA: 0
PALPITATIONS: 0
ARTHRALGIAS: 0
WEAKNESS: 0
COUGH: 0
FREQUENCY: 0
SORE THROAT: 0
HEARTBURN: 0
ABDOMINAL PAIN: 0
HEADACHES: 0
DIZZINESS: 0
CONSTIPATION: 0
BREAST MASS: 0
SHORTNESS OF BREATH: 0
DYSURIA: 0

## 2023-03-15 ASSESSMENT — PAIN SCALES - GENERAL: PAINLEVEL: NO PAIN (0)

## 2023-03-15 NOTE — PATIENT INSTRUCTIONS
"  1.\"Eat food.  Not too much.  Mostly plants\" - Jann Pollen - see link below  - Aim for 5-7 servings of vegetables (raw vegetables - 1 serving = 1/2 cup; raw greens - 1 serving = 1 cup)   - Eat grains, but don't make them the superstar of your meal and DO make them whole grains  2. AVOID sugar.  There is no nutritional benefit to sugar.  3. Drink lots of water (avoid juice and soda)  4. MOVE your body daily - even if some days this means 5 minutes of movement. Walking is great for your bones and brain.  Do aim for 150 minutes per week of activity that raises your heart rate, but \"don't let the ideal get in the way of the good enough\"  5. Get good sleep (6-8 hours/night- less sleep is associated with disease/illness/obesity)   6. Smile and laugh every day - even in the face of tragedy  7. Start a meditation or mindfulness practice    CALCIUM: The average recommended intake for women is 7653-2958 mg calcium daily. It is best to get this from your diet (Milk, yogurt, and cheese, vegetables such as Chinese cabbage, kale, spinach and broccoli). If you are not eating enough calcium, then I recommend Calcium Citrate/vitD supplements daily.     Vitamin D is an essential nutritient that many [place] lack, especially in the winter. It is vital for healthy immune system functioning and can be linked to diseases such as obesity, diabetes, body aches/pain, etc. It is super safe and highly beneficial for a [place] to take a vitamin D3 2000 IU once daily during winter months. Daily vitamin D for life is recommended for anyone who has ever been found deficient.    Other things to consider: do not drive while under the influence of alcohol, do not drive while texting, always wear a seatbelt, wear a helmet when biking, wear sunscreen to help prevent skin cancer, use DEET mosquito spray when outdoors to prevent mosquito and tick bites, & avoid smoking. If you do get bit by a DEER tick, please contact my office immediately to " "consider lyme prophylaxis. Dental visits recommended every 6-12 months.    Jann Rodriguez's 7 Rules for Eating  https://[email or web address].com/food-recipes/news/67491126/7-rules-for-eating#1    My clinic hours are as follows:  Monday virtual appointments 12-2p  Tuesday in clinic appts 9a-5p  Thursday virtual appts 7a-9a  If you need an appointment urgently and do not find one available on MINDBODY or with Central scheduling, please send me a MINDBODY message and I will work to get you scheduled with myself or a colleague here  I do not work Fridays and e-visits submitted late Thursday or on Friday may not be seen until Monday     Clinic notes  Lab and imaging results are now available for you to view immediately on completion.  Please know that I often have not seen the result before you see results.  I will interpret and discuss the results and meaning of the results as soon as I am able to review them.   MINDBODY is the best way to reach the clinic directly.  When you send a MINDBODY message this will be read by our clinic RNs.  Please know that when you call the clinic number, you are not speaking to a staff member from our local clinic.  You can ask that staff to speak to a clinic staff directly if you wish.  You will be able to read my documentation (\"provider notes\") when I finish up and close your chart.  I encourage you to read through to understand your diagnosis and the plan and how we arrived there.  It is also an opportunity to make sure I fully understood your concerns.    "

## 2023-03-15 NOTE — RESULT ENCOUNTER NOTE
Gerardo,    Labs all look stable. No new recommendations from me. It was great to see you and I look forward to hearing about continued positive changes for 2023!    MELANIE Linton

## 2023-03-15 NOTE — PROGRESS NOTES
"   SUBJECTIVE:   CC: Gerardo is an 52 year old who presents for preventive health visit.   Patient has been advised of split billing requirements and indicates understanding: Yes  Healthy Habits:     Getting at least 3 servings of Calcium per day:  Yes    Bi-annual eye exam:  Yes    Dental care twice a year:  Yes    Sleep apnea or symptoms of sleep apnea:  None    Diet:  Vegetarian/vegan    Frequency of exercise:  4-5 days/week    Duration of exercise:  30-45 minutes    Taking medications regularly:  Yes    PHQ-2 Total Score: 0    Additional concerns today:  No    Nehemiah is 15 yo and a sophomore, taking second PSEO class, has a job at [employer]  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 and largely at home with Nehemiah  Has been helping her parents move out of their house  Wife had knee replacement    HM -    Cancer screenings - UTD   Chronic conditions screenings - HTN labs, lipids   Immunizations - discussed hep B - low risk for exposure, defer today  Habits  -   Exercise - walking and hour a day, additionally two times a week at the gym swimming and weight training      Nutrition - eating mostly meals made at home, thinking she has a dairy intolerance and takes supplement for calcium - can eat yogurt, doesn't eat beef or pork and largely eats vegetarian because son is vegetarian. Eating a lot of legumes and vegetables. Working on reducing salt.     Mental health/mindfulness - journals, depression and anxiety \"are a part of my life\" - thinks about seeing a therapist, but never makes an appointment, more aware of boundaries and limitations, things feel doable currently, continues working - bouyed by time to exercise with Michelle and monthly game nights with friends     Alcohol/cigarettes - no cigarettes, CBD, THC  Sexual health - not often, not painful  Goals - restart yoga, work on flexibility and strength, more time for herself, more time to spend on hobbies  Challenges - " continuing to support parents and son, overall balancing     No one in Gerardo's immediate family has had covid. Are seeing friends.     Sleep has been disrupted by worries waking her in the middle of the night. Seems to be getting better. Also having hot flashes. Taking magnesium (250 mg) nightly before bed. Would be difficult to double dose due to stools. Best helped by mental health and lower stress and regular exercise.    Pelvic floor - feels weak and after urinating having dropping. Feels like urethra is not closing as well. No other times of incontinence or large amounts. No dysuria no blood in urine. No vaginal bleeding. No pelvic pain.    Blood pressure - home BPs 110/70's. We did trial off and blood pressures increased.    Today's PHQ-2 Score:   PHQ-2 ( 1999 Pfizer) 3/15/2023   Q1: Little interest or pleasure in doing things 0   Q2: Feeling down, depressed or hopeless 0   PHQ-2 Score 0   PHQ-2 Total Score (12-17 Years)- Positive if 3 or more points; Administer PHQ-A if positive -   Q1: Little interest or pleasure in doing things Not at all   Q2: Feeling down, depressed or hopeless Not at all   PHQ-2 Score 0       Social History     Tobacco Use     Smoking status: Never     Smokeless tobacco: Never   Substance Use Topics     Alcohol use: Yes     Comment: 0-1 drink per week         Alcohol Use 3/15/2023   Prescreen: >3 drinks/day or >7 drinks/week? No   No flowsheet data found.    Reviewed orders with patient.  Reviewed health maintenance and updated orders accordingly - Yes  Lab work is in process  Labs reviewed in EPIC    Breast Cancer Screening:    FHS-7:   Breast CA Risk Assessment (FHS-7) 11/15/2021 1/31/2022 12/6/2022 2/28/2023 3/15/2023   Did any of your first-degree relatives have breast or ovarian cancer? No No No No No   Did any of your relatives have bilateral breast cancer? No No No No No   Did any man in your family have breast cancer? No No No No No   Did any woman in your family have breast and  ovarian cancer? No No No No Yes   Did any woman in your family have breast cancer before age 50 y? No No No No No   Do you have 2 or more relatives with breast and/or ovarian cancer? No Yes No Yes No   Do you have 2 or more relatives with breast and/or bowel cancer? No Yes Yes Yes No     click delete button to remove this line now  Mammogram Screening: Recommended annual mammography  Pertinent mammograms are reviewed under the imaging tab.    History of abnormal Pap smear: NO - age 30-65 PAP every 5 years with negative HPV co-testing recommended  PAP / HPV Latest Ref Rng & Units 1/15/2021 10/2/2015 3/4/2013   PAP (Historical) - NIL NIL NIL   HPV16 NEG:Negative Negative Negative -   HPV18 NEG:Negative Negative Negative -   HRHPV NEG:Negative Negative Negative -     Reviewed and updated as needed this visit by clinical staff   Tobacco  Allergies  Meds  Problems             Reviewed and updated as needed this visit by Provider      Problems                Review of Systems   Constitutional: Negative for chills and fever.   HENT: Negative for congestion, ear pain, hearing loss and sore throat.    Eyes: Negative for pain and visual disturbance.   Respiratory: Negative for cough and shortness of breath.    Cardiovascular: Negative for chest pain, palpitations and peripheral edema.   Gastrointestinal: Negative for abdominal pain, constipation, diarrhea, heartburn, hematochezia and nausea.   Breasts:  Negative for tenderness, breast mass and discharge.   Genitourinary: Negative for dysuria, frequency, genital sores, hematuria, pelvic pain, urgency, vaginal bleeding and vaginal discharge.   Musculoskeletal: Negative for arthralgias, joint swelling and myalgias.   Skin: Negative for rash.   Neurological: Negative for dizziness, weakness, headaches and paresthesias.   Psychiatric/Behavioral: Negative for mood changes. The patient is not nervous/anxious.         OBJECTIVE:   /70 (BP Location: Left arm, Patient  "Position: Sitting, Cuff Size: Adult Regular)   Pulse 72   Temp 98.3  F (36.8  C) (Temporal)   Resp 16   Ht 1.61 m (5' 3.39\")   Wt 57.4 kg (126 lb 8 oz)   LMP 11/05/2012   SpO2 97%   BMI 22.14 kg/m    Physical Exam  GENERAL: healthy, alert and no distress  EYES: Eyes grossly normal to inspection, PERRL and conjunctivae and sclerae normal  HENT: ear canals and TM's normal, nose and mouth without ulcers or lesions  NECK: no adenopathy, no asymmetry, masses, or scars and thyroid normal to palpation  RESP: lungs clear to auscultation - no rales, rhonchi or wheezes  BREAST: normal without masses, tenderness or nipple discharge and no palpable axillary masses or adenopathy  CV: regular rate and rhythm, normal S1 S2, no S3 or S4, no murmur, click or rub, no peripheral edema and peripheral pulses strong  ABDOMEN: soft, nontender, no hepatosplenomegaly, no masses and bowel sounds normal  MS: no gross musculoskeletal defects noted, no edema  SKIN: no suspicious lesions or rashes  NEURO: Normal strength and tone, mentation intact and speech normal  PSYCH: mentation appears normal, affect normal/bright  LYMPH: no cervical, supraclavicular, axillary adenopathy    Diagnostic Test Results:  Labs reviewed in Epic  Results for orders placed or performed in visit on 03/15/23   Basic metabolic panel  (Ca, Cl, CO2, Creat, Gluc, K, Na, BUN)     Status: Normal   Result Value Ref Range    Sodium 141 136 - 145 mmol/L    Potassium 4.0 3.4 - 5.3 mmol/L    Chloride 103 98 - 107 mmol/L    Carbon Dioxide (CO2) 27 22 - 29 mmol/L    Anion Gap 11 7 - 15 mmol/L    Urea Nitrogen 11.7 6.0 - 20.0 mg/dL    Creatinine 0.63 0.51 - 0.95 mg/dL    Calcium 9.9 8.6 - 10.0 mg/dL    Glucose 98 70 - 99 mg/dL    GFR Estimate >90 >60 mL/min/1.73m2   Lipid panel reflex to direct LDL Fasting     Status: Abnormal   Result Value Ref Range    Cholesterol 230 (H) <200 mg/dL    Triglycerides 92 <150 mg/dL    Direct Measure HDL 68 >=50 mg/dL    LDL Cholesterol " Calculated 144 (H) <=100 mg/dL    Non HDL Cholesterol 162 (H) <130 mg/dL    Narrative    Cholesterol  Desirable:  <200 mg/dL    Triglycerides  Normal:  Less than 150 mg/dL  Borderline High:  150-199 mg/dL  High:  200-499 mg/dL  Very High:  Greater than or equal to 500 mg/dL    Direct Measure HDL  Female:  Greater than or equal to 50 mg/dL   Male:  Greater than or equal to 40 mg/dL    LDL Cholesterol  Desirable:  <100mg/dL  Above Desirable:  100-129 mg/dL   Borderline High:  130-159 mg/dL   High:  160-189 mg/dL   Very High:  >= 190 mg/dL    Non HDL Cholesterol  Desirable:  130 mg/dL  Above Desirable:  130-159 mg/dL  Borderline High:  160-189 mg/dL  High:  190-219 mg/dL  Very High:  Greater than or equal to 220 mg/dL   Albumin Random Urine Quantitative with Creat Ratio     Status: None   Result Value Ref Range    Creatinine Urine mg/dL 48.8 mg/dL    Albumin Urine mg/L <12.0 mg/L    Albumin Urine mg/g Cr         ASSESSMENT/PLAN:   (Z00.00) Routine general medical examination at a health care facility  (primary encounter diagnosis)  Comment:   Plan: Excellent nutrition. Exercise good and with increases in weight training. Plans for restarting yoga which will be good for back. I am happy to hear that she has plans for more time for herself. Sheis doing well being aware of mental health struggles and managing these.     (I10) Benign essential hypertension  Comment:   Plan: Basic metabolic panel  (Ca, Cl, CO2, Creat,         Gluc, K, Na, BUN), Albumin Random Urine         Quantitative with Creat Ratio, lisinopril         (ZESTRIL) 10 MG tablet     Well controlled. Of note, we have tried discontinuation due to well control and the lisinopril 10 mg was necessary.    (N39.492) Postural urinary incontinence  Comment:   Plan: Physical Therapy Referral        No red flag signs.     (Z13.220) Lipid screening  Comment:   Plan: Lipid panel reflex to direct LDL Fasting              Patient has been advised of split billing  requirements and indicates understanding: Yes      COUNSELING:  Reviewed preventive health counseling, as reflected in patient instructions        She reports that she has never smoked. She has never used smokeless tobacco.      MANUEL Lopez CNP [facility]

## 2023-08-03 ENCOUNTER — OFFICE VISIT (OUTPATIENT)
Dept: PODIATRY | Facility: CLINIC | Age: 53
End: 2023-08-03
Payer: COMMERCIAL

## 2023-08-03 VITALS — SYSTOLIC BLOOD PRESSURE: 109 MMHG | HEIGHT: 63 IN | BODY MASS INDEX: 22.13 KG/M2 | DIASTOLIC BLOOD PRESSURE: 70 MMHG

## 2023-08-03 DIAGNOSIS — R29.898 LEFT LEG WEAKNESS: ICD-10-CM

## 2023-08-03 DIAGNOSIS — M21.41 PES PLANUS OF BOTH FEET: ICD-10-CM

## 2023-08-03 DIAGNOSIS — M21.42 PES PLANUS OF BOTH FEET: ICD-10-CM

## 2023-08-03 DIAGNOSIS — Z87.312 HISTORY OF STRESS FRACTURE: Primary | ICD-10-CM

## 2023-08-03 PROCEDURE — 99213 OFFICE O/P EST LOW 20 MIN: CPT | Performed by: PODIATRIST

## 2023-08-03 NOTE — PROGRESS NOTES
ASSESSMENT:  Encounter Diagnoses   Name Primary?    History of stress fracture Yes    Pes planus of both feet      MEDICAL DECISION MAKING:  I am happy to provide a referral for new custom orthoses.  She has functional type devices.  These keep her feet comfortable and have prevented recurrence of stress fracture.    Gerardo Mandujano is referred to [facility] for prescription orthoses.      She will continue using a small crest pad to elevate the right fourth toe.    I do not detect any obvious strength deficit involving her left lower extremity.  She is interested in a referral to physical therapy for this.  This is a reasonable request and the referral provided.    Disclaimer: This note consists of symbols derived from keyboarding, dictation and/or voice recognition software. As a result, there may be errors in the script that have gone undetected. Please consider this when interpreting information found in this chart.    Jann Boyd DPM, FACFAS, MS    [facility]      ____________________________________________________________________    HPI:       Gerardo Mandujano presents inquiring about new orthotic devices.  These were originally prescribed back in 2020 when she was being treated for a stress reaction of bone and pain in the left foot.  She reports that the help keep her feet comfortable.  She continues to    Finding pain relief using a small crest pad to elevate the right fourth toe.  We addressed the toe pain and 2021.    New problem:  She reports the perception of weakness in her left leg when she exercises.  *  Past Medical History:   Diagnosis Date    Allergies     Seasonal    Tension headache    *  *  Past Surgical History:   Procedure Laterality Date    C ANESTH, SECTION  2007    LAPAROSCOPIC HYSTERECTOMY SUPRACERVICAL  2012    benign path   *  *  Current Outpatient Medications   Medication Sig Dispense Refill  "   Calcium-Magnesium-Zinc 333-133-5 MG TABS per tablet Take 1 tablet by mouth daily      diclofenac (VOLTAREN) 1 % topical gel Apply 2 g topically 4 times daily 350 g 1    ibuprofen 200 MG capsule Take 200 mg by mouth as needed      lisinopril (ZESTRIL) 10 MG tablet Take 1 tablet (10 mg) by mouth daily 90 tablet 3    VITAMIN D, CHOLECALCIFEROL, PO Take  by mouth daily.           EXAM:    Vitals: /70   Ht 1.61 m (5' 3.39\")   LMP 11/05/2012   BMI 22.13 kg/m    BMI: Body mass index is 22.13 kg/m .    Vascular:  Pedal pulses are palpable for both the DP and PT arteries.  CFT < 3 sec.  No edema.       Neuro: Light touch sensation is intact to the L4, L5, S1 distributions  No evidence of weakness, spasticity, or contracture in the lower extremities.      Derm: Normal texture and turgor.  No erythema, ecchymosis, or cyanosis.  No open lesions.      Musculoskeletal:  No gross deformities.  She has mild contracture and varus rotation of the bilateral fourth and fifth toes.  These toes are flexible are reducible.  Flatter foot structure.      Testing lower extremity strength clinically does not reveal any deficit on the left.          "

## 2023-08-03 NOTE — LETTER
8/3/2023         RE: Gerardo Mandujano  [address]  [facility] [address]        Dear Colleague,    Thank you for referring your patient, Gerardo Mandujano, to the [facility]. Please see a copy of my visit note below.    ASSESSMENT:  Encounter Diagnoses   Name Primary?     History of stress fracture Yes     Pes planus of both feet      MEDICAL DECISION MAKING:  I am happy to provide a referral for new custom orthoses.  She has functional type devices.  These keep her feet comfortable and have prevented recurrence of stress fracture.    Gerardo Mandujano is referred to [facility] for prescription orthoses.      She will continue using a small crest pad to elevate the right fourth toe.    I do not detect any obvious strength deficit involving her left lower extremity.  She is interested in a referral to physical therapy for this.  This is a reasonable request and the referral provided.    Disclaimer: This note consists of symbols derived from keyboarding, dictation and/or voice recognition software. As a result, there may be errors in the script that have gone undetected. Please consider this when interpreting information found in this chart.    Jann Boyd, BLADIMIR, FACFAS, MS    [facility]      ____________________________________________________________________    HPI:       Gerardo Mandujano presents inquiring about new orthotic devices.  These were originally prescribed back in January 2020 when she was being treated for a stress reaction of bone and pain in the left foot.  She reports that the help keep her feet comfortable.  She continues to    Finding pain relief using a small crest pad to elevate the right fourth toe.  We addressed the toe pain and November 2021.    New problem:  She reports the perception of weakness in her left leg when she exercises.  *  Past Medical History:   Diagnosis Date     Allergies      "Seasonal     Tension headache    *  *  Past Surgical History:   Procedure Laterality Date     C ANESTH, SECTION  2007     LAPAROSCOPIC HYSTERECTOMY SUPRACERVICAL  2012    benign path   *  *  Current Outpatient Medications   Medication Sig Dispense Refill     Calcium-Magnesium-Zinc 333-133-5 MG TABS per tablet Take 1 tablet by mouth daily       diclofenac (VOLTAREN) 1 % topical gel Apply 2 g topically 4 times daily 350 g 1     ibuprofen 200 MG capsule Take 200 mg by mouth as needed       lisinopril (ZESTRIL) 10 MG tablet Take 1 tablet (10 mg) by mouth daily 90 tablet 3     VITAMIN D, CHOLECALCIFEROL, PO Take  by mouth daily.           EXAM:    Vitals: /70   Ht 1.61 m (5' 3.39\")   LMP 2012   BMI 22.13 kg/m    BMI: Body mass index is 22.13 kg/m .    Vascular:  Pedal pulses are palpable for both the DP and PT arteries.  CFT < 3 sec.  No edema.       Neuro: Light touch sensation is intact to the L4, L5, S1 distributions  No evidence of weakness, spasticity, or contracture in the lower extremities.      Derm: Normal texture and turgor.  No erythema, ecchymosis, or cyanosis.  No open lesions.      Musculoskeletal:  No gross deformities.  She has mild contracture and varus rotation of the bilateral fourth and fifth toes.  These toes are flexible are reducible.  Flatter foot structure.      Testing lower extremity strength clinically does not reveal any deficit on the left.            Again, thank you for allowing me to participate in the care of your patient.        Sincerely,        Jann Boyd DPM  "

## 2023-08-03 NOTE — PATIENT INSTRUCTIONS
Thank you for choosing [facility]iat[facility] / Foot & Ankle Surgery!    DR. COSTELLO'S CLINIC LOCATIONS:     [facility] TRIAGE LINE: [phone]   [address] [place] [address] APPOINTMENTS: [phone]   [place] [address] RADIOLOGY: [phone]   (Every other Tues - Wed - Fri PM) SET UP SURGERY: [phone]    PHYSICAL THERAPY: [phone]   [place] SPECIALTY BILLING QUESTIONS: [phone]   [address] FAX: [phone]   [place] [address]    (Thurs & Fri AM)         [facility] LOCATIONS  [facility]  [address]  [place] [address]  Phone: [phone]  Fax: [phone] [facility]  [address] [place] [address] [address]  [place] [address]  Phone: [phone]   Fax: [phone]   [facility]  [address]  [place] [address]  Phone: [phone]  Fax: [phone] [facility]  [address]  [place] [address]  Phone: [phone]   Fax: [phone]   [facility]   [address]  [place] [address]  Phone: [phone]  Fax: [phone] * Please call any location listed to make an appointment for a casting/fitting. Your referral was sent to their central office and they will all have the order on file.

## 2023-09-28 ENCOUNTER — THERAPY VISIT (OUTPATIENT)
Dept: PHYSICAL THERAPY | Facility: CLINIC | Age: 53
End: 2023-09-28
Attending: NURSE PRACTITIONER
Payer: COMMERCIAL

## 2023-09-28 DIAGNOSIS — M62.89 PELVIC FLOOR DYSFUNCTION: Primary | ICD-10-CM

## 2023-09-28 DIAGNOSIS — N39.492 POSTURAL URINARY INCONTINENCE: ICD-10-CM

## 2023-09-28 PROCEDURE — 97110 THERAPEUTIC EXERCISES: CPT | Mod: GP | Performed by: PHYSICAL THERAPIST

## 2023-09-28 PROCEDURE — 97530 THERAPEUTIC ACTIVITIES: CPT | Mod: GP | Performed by: PHYSICAL THERAPIST

## 2023-09-28 PROCEDURE — 97535 SELF CARE MNGMENT TRAINING: CPT | Mod: GP | Performed by: PHYSICAL THERAPIST

## 2023-09-28 PROCEDURE — 97161 PT EVAL LOW COMPLEX 20 MIN: CPT | Mod: GP | Performed by: PHYSICAL THERAPIST

## 2023-09-28 NOTE — PROGRESS NOTES
PHYSICAL THERAPY EVALUATION  Type of Visit: Evaluation    See electronic medical record for Abuse and Falls Screening details.    Subjective     Patient reports that she has urinary leakage that has been ongoing for a year.   She reports more frequent urination and leaking or dribbling after completing urination.  She notes a life change about 4 years ago when she started being home all of the time and also starting blood pressure medication at that time as well.  Frustrated with how focused she is on her urination and knowing where bathroom is, wants to have good pelvic and bladder health for good self care.  History or constipation issues, but doesn't have issues anymore and goes to the bathroom regularly.  Has history of difficulty reaching orgasm, has been an issue forever and does not report enjoying vaginal penetration.  R side hip more tight after hysterectomy.    Presenting condition or subjective complaint: leaking after urination  Date of onset: 03/15/23    Relevant medical history:     Dates & types of surgery: 2012 hysterectomy, 3/2007     Prior diagnostic imaging/testing results:       Prior therapy history for the same diagnosis, illness or injury: No      Prior Level of Function  Transfers: Independent  Ambulation: Independent  ADL: Independent  IADL: Childcare, Driving, Finances, Housekeeping, Laundry, Meal preparation, Medication management    Living Environment  Social support: With a significant other or spouse   Type of home: House   Stairs to enter the home: Yes 8 Is there a railing: No   Ramp: No   Stairs inside the home: Yes 20 Is there a railing: Yes   Help at home: None  Equipment owned:       Employment: Yes    Hobbies/Interests: Reading and swimming    Patient goals for therapy: Not have leaking.    Pain assessment: See objective evaluation for additional pain details     Objective      PELVIC EVALUATION  ADDITIONAL HISTORY:  Sex assigned at birth: Female  Gender identity:  Female    Pronouns: She/Her Hers      Bladder History:  Feels bladder filling: Yes  Triggers for feeling of inability to wait to go to the bathroom: Yes Water running  How long can you wait to urinate: Up to 4 hours  Gets up at night to urinate: Yes 1  Can stop the flow of urine when urinating: Yes  Volume of urine usually released: Medium   Other issues: Dribbling after urinating  Number of bladder infections in last 12 months:    Fluid intake per day: 24 8    Medications taken for bladder: No     Activities causing urine leak: Hurrying to the bathroom due to a strong urge to urinate (pee)    Amount of urine typically leaked: Small  Pads used to help with leaking: No        Bowel History:  Frequency of bowel movement:    Consistency of stool:      Ignores the urge to defecate:    Other bowel issues:    Length of time spent trying to have a bowel movement:      Sexual Function History:  Sexual orientation: Bisexual    Sexually active: Yes  Lubrication used: No No  Pelvic pain:      Pain or difficulty with orgasms/erection/ejaculation: No    State of menopause: Post-menopause (I am done with menopause)  Hormone medications: No      Are you currently pregnant: No, Number of previous pregnancies: 1, Number of deliveries: 1, If you have delivered before, did you have any of these issues during delivery:  delivery, Have you been diagnosed with pelvic prolapse or abdominal separation: No, Do you get regular exercise: Yes, I do this type of exercise: Walking swimming yoga, Have you tried pelvic floor strengthening exercises for 4 weeks: No, Do you have any history of trauma that is relevant to your care that you d like to share: No    Discussed reason for referral regarding pelvic health needs and external/internal pelvic floor muscle examination with patient/guardian.  Opportunity provided to ask questions and verbal consent for assessment and intervention was given.    PAIN: Pain Level at Rest: 0/10  POSTURE:  WNL  LUMBAR SCREEN: AROM WNL  HIP SCREEN:  Strength: WNL   Functional Strength Testing:     PELVIC/SI SCREEN:  Standing Forward Bend: negative    PAIN PROVOCATION TEST: WNL  PELVIS/SI SPECIAL TESTS: WNL  BREATHING SYMMETRY: Asymmetrical    PELVIC EXAM  External Visual Inspection:  At rest: Elevated perineal body  With voluntary pelvic floor contraction: Perineal elevation  Relaxation of PFM: Partial/delayed relaxation  With intra-abdominal pressure: Cough: Perineal descent  Valsalva: Perineal elevation  Bearing down as defecation: Perineal descent    Integumentary:   Introitus: Unremarkable    External Digital Palpation per Perineum:   Ischiocavernosis: Unremarkable  Bulbo cavernosis: Tenderness  Transverse perineal: Unremarkable  Levator ani: Unremarkable    Scar:   Location/Type:   Mobility:     Internal Digital Palpation:  Per Vagina:  Tenderness  Tone: high  Digital Muscle Performance: P (Power): 2  E (Endurance): 4  F (Fast Twitch): 4  Compensations: none  Relaxation Post-Contraction: Partial/delayed relaxation    Per Rectum:        Pelvic Organ Prolapse:       ABDOMINAL ASSESSMENT  Diastasis Rectus Abdominis (NESS):      Abdominal Activation/Strength:     Scar:   Location/Type:   Mobility:     Fascial Tension/Restriction/Tone:     BIOFEEDBACK:  Position:   Surface Electrodes:     Abdominals:     Perianals:       DERMATOMES:   DTR S:     Assessment & Plan   CLINICAL IMPRESSIONS  Medical Diagnosis: postural urinary incontinence    Treatment Diagnosis: GIORGI, pelvic floor dysfunction   Impression/Assessment: Patient is a 52 year old female with pelvic floor  complaints.  The following significant findings have been identified: Decreased ROM/flexibility, Decreased joint mobility, Decreased strength, Impaired muscle performance, and Decreased activity tolerance. These impairments interfere with their ability to perform self care tasks, work tasks, recreational activities, and household chores as compared to previous  level of function.     Clinical Decision Making (Complexity):  Clinical Presentation: Stable/Uncomplicated  Clinical Presentation Rationale: based on medical and personal factors listed in PT evaluation  Clinical Decision Making (Complexity): Low complexity    PLAN OF CARE  Treatment Interventions:  Modalities: Biofeedback  Interventions: Manual Therapy, Neuromuscular Re-education, Therapeutic Activity, Therapeutic Exercise, Self-Care/Home Management    Long Term Goals     PT Goal 1  Goal Identifier: STG 1  Goal Description: Patient will decrease episodes of urinary incontinence to 0 times in a week  Rationale: to maximize safety and independence with performance of ADLs and functional tasks  Target Date: 12/21/23      Frequency of Treatment: every 1-2 weeks  Duration of Treatment: 12 weeks    Recommended Referrals to Other Professionals:  No further referral needed   Education Assessment:        Risks and benefits of evaluation/treatment have been explained.   Patient/Family/caregiver agrees with Plan of Care.     Evaluation Time:     PT Eval, Low Complexity Minutes (02027): 20   Present: Not applicable     Signing Clinician: Sheyla Rodriguez, PT

## 2023-10-05 ENCOUNTER — THERAPY VISIT (OUTPATIENT)
Dept: PHYSICAL THERAPY | Facility: CLINIC | Age: 53
End: 2023-10-05
Payer: COMMERCIAL

## 2023-10-05 DIAGNOSIS — M62.89 PELVIC FLOOR DYSFUNCTION: ICD-10-CM

## 2023-10-05 DIAGNOSIS — N39.492 POSTURAL URINARY INCONTINENCE: Primary | ICD-10-CM

## 2023-10-05 PROCEDURE — 97110 THERAPEUTIC EXERCISES: CPT | Mod: GP | Performed by: PHYSICAL THERAPIST

## 2023-10-05 PROCEDURE — 97530 THERAPEUTIC ACTIVITIES: CPT | Mod: GP | Performed by: PHYSICAL THERAPIST

## 2023-10-06 ENCOUNTER — THERAPY VISIT (OUTPATIENT)
Dept: PHYSICAL THERAPY | Facility: CLINIC | Age: 53
End: 2023-10-06
Attending: PODIATRIST
Payer: COMMERCIAL

## 2023-10-06 ENCOUNTER — MYC MEDICAL ADVICE (OUTPATIENT)
Dept: FAMILY MEDICINE | Facility: CLINIC | Age: 53
End: 2023-10-06

## 2023-10-06 DIAGNOSIS — R29.898 WEAKNESS OF LEFT FOOT: Primary | ICD-10-CM

## 2023-10-06 DIAGNOSIS — M54.2 CERVICALGIA: ICD-10-CM

## 2023-10-06 DIAGNOSIS — R29.898 LEFT LEG WEAKNESS: ICD-10-CM

## 2023-10-06 DIAGNOSIS — M54.50 ACUTE LOW BACK PAIN, UNSPECIFIED BACK PAIN LATERALITY, UNSPECIFIED WHETHER SCIATICA PRESENT: Primary | ICD-10-CM

## 2023-10-06 PROCEDURE — 97161 PT EVAL LOW COMPLEX 20 MIN: CPT | Mod: GP | Performed by: PHYSICAL THERAPIST

## 2023-10-06 PROCEDURE — 97110 THERAPEUTIC EXERCISES: CPT | Mod: GP | Performed by: PHYSICAL THERAPIST

## 2023-10-06 NOTE — TELEPHONE ENCOUNTER
Per pt jimmyt requesting referral for PT for low back pain.    Pended referral.    Thanks!  Shola Pradhan RN   [facility]     84y Male PMH periportal abd mass PSH robotic periportal LN bx by Dr Harris on 9/11/17 transferred from [facility] for hypotension and abdominal pain. SBP as low as 90s.     Surgonc: Dr. Harris 84y Male PMH periportal abd mass PSH robotic periportal LN bx by Dr Harris on 9/11/17 transferred from [facility] for hypotension and abdominal pain. SBP as low as 90s. Mainly complaining of abdominal pain, no dyspnea or chest pain.    Surgonc: Dr. Harris 84y Male PMH periportal abd mass PSH robotic periportal LN bx by Dr Harris on 9/11/17 (prelim as B cell lymphoma) transferred from [facility] for hypotension and abdominal pain. SBP as low as 90s. Mainly complaining of abdominal pain and distension, some dyspnea and tachypnea, no chest pain. Limited ambulation at [facility], + increasing BLE edema.    Surgonc: Dr. Harris 84y Male PMH periportal abd mass PSH robotic periportal LN bx by Dr Harris on 9/11/17 (prelim as B cell lymphoma) transferred from [facility] for hypotension and abdominal pain. SBP as low as 90s. Mainly complaining of abdominal pain and distension, some dyspnea and tachypnea, no chest pain. Limited ambulation at [facility], + increasing BLE edema.  Attending Matt: agree with above. additionally pt states has been having some discharge from surgical site. intermittent abdominal pain. pt recently told had lymphoma, has not started on chemo. reports some cough and fatigue.   Surgonc: Dr. Harris

## 2023-10-06 NOTE — PROGRESS NOTES
PHYSICAL THERAPY EVALUATION  Type of Visit: Evaluation    See electronic medical record for Abuse and Falls Screening details.    Subjective       Presenting condition or subjective complaint: leaking after urination (incorrect information flowed into note, this is patient information from other body part evaluation she had). Current condition is left leg weakness.  MD order 8/3/23  Pt reports left foot weakness. Does not notice when shoes are on and walking. But if she is barefoot and walking the left foot feels different than right. Had a stress fracture in left foot in  and feels ever since then it has felt off. Not having pain in the foot, just the weakness she feels. Does have a toe issue on right foot, just a different curve to it, but she wears a little cushion that helps. Also got orthotics around the time of stress fracture. Continues to wear these. Does feel she doesn't have great balance. Was instruct to wear shoes around the house to support foot but then feels this could cause her foot to get weaker. Mainly swims for exercise. Does also walk dog for exercise. Used to do yoga but has not over the last few years.      Date of onset:  (8/3/23 MD order)    Relevant medical history:     Dates & types of surgery: 2012 hysterectomy, 3/2007     Prior diagnostic imaging/testing results:       Prior therapy history for the same diagnosis, illness or injury: No      Living Environment  Social support: With a significant other or spouse   Type of home: House   Stairs to enter the home: Yes 8 Is there a railing: No   Ramp: No   Stairs inside the home: Yes 20 Is there a railing: Yes   Help at home: None  Equipment owned:       Employment: Yes    Hobbies/Interests: Reading and swimming    Patient goals for therapy: Not have leaking. (incorrect information flowed into note, this is patient information from other body part evaluation she had).        Objective   FOOT/ANKLE EVALUATION  PAIN: Pain Level at  Rest: 0/10  Pain Level with Use: 0/10  Pain Location: weakness in foot  Pain Quality: weakness  Pain Frequency: intermittent  Pain is Worst: daytime  Pain is Exacerbated By: walking barefoot  Pain is Relieved By: wearing orthotics  Pain Progression: Unchanged    WEIGHT BEARING ALIGNMENT: Foot/Ankle WB Alignment:Pes planus L  ROM:   (Degrees) AROM    LEFT RIGHT   Ankle Dorsiflexion 10 5   Ankle Plantarflexion WNL WNL   Ankle Inversion WNL WNL   Ankle Eversion WNL WNL     STRENGTH:   Pain: - none + mild ++ moderate +++ severe  Strength Scale: 0-5/5 Left Right   Ankle Dorsiflexion 5 5   Ankle Plantarflexion 5- 5-   Ankle Inversion 5- 5-   Ankle Eversion 5- 5-   Great Toe Flexion 5 5   Great Toe Extension 5- 5-     FLEXIBILITY:  decr right gastroc flexibility  BALANCE/PROPRIOCEPTION: Single Leg Stance Eyes Open (seconds): R 20 sec, L 8 sec      Assessment & Plan   CLINICAL IMPRESSIONS  Medical Diagnosis: left leg weakness    Treatment Diagnosis: left foot weakness   Impression/Assessment: Patient is a 52 year old female with left foot weakness complaints.  The following significant findings have been identified: Decreased ROM/flexibility, Decreased strength, and Impaired balance. These impairments interfere with their ability to perform household mobility as compared to previous level of function.     Clinical Decision Making (Complexity):  Clinical Presentation: Stable/Uncomplicated  Clinical Presentation Rationale: based on medical and personal factors listed in PT evaluation  Clinical Decision Making (Complexity): Low complexity    PLAN OF CARE  Treatment Interventions:  Interventions: Neuromuscular Re-education, Therapeutic Activity, Therapeutic Exercise    Long Term Goals     PT Goal 1  Goal Identifier: L leg/balance  Goal Description: pt will be able to demonstrate SL balance on left for 30 seconds  Rationale:  (to maximize safety and control in SL stance for ADLs/dressing)  Goal Progress: L 8 sec  Target Date:  12/01/23      Frequency of Treatment: 2x/month  Duration of Treatment: 8 weeks      Education Assessment:        Risks and benefits of evaluation/treatment have been explained.   Patient/Family/caregiver agrees with Plan of Care.     Evaluation Time:     PT Eval, Low Complexity Minutes (81888): 24       Signing Clinician: Tonie Styles PT

## 2023-10-12 PROBLEM — M62.89 PELVIC FLOOR DYSFUNCTION: Status: ACTIVE | Noted: 2023-10-12

## 2023-10-12 PROBLEM — N39.492 POSTURAL URINARY INCONTINENCE: Status: ACTIVE | Noted: 2023-10-12

## 2023-10-19 ENCOUNTER — THERAPY VISIT (OUTPATIENT)
Dept: PHYSICAL THERAPY | Facility: CLINIC | Age: 53
End: 2023-10-19
Payer: COMMERCIAL

## 2023-10-19 DIAGNOSIS — M62.89 PELVIC FLOOR DYSFUNCTION: ICD-10-CM

## 2023-10-19 DIAGNOSIS — N39.492 POSTURAL URINARY INCONTINENCE: Primary | ICD-10-CM

## 2023-10-19 PROCEDURE — 97530 THERAPEUTIC ACTIVITIES: CPT | Mod: GP | Performed by: PHYSICAL THERAPIST

## 2023-10-19 PROCEDURE — 97110 THERAPEUTIC EXERCISES: CPT | Mod: GP | Performed by: PHYSICAL THERAPIST

## 2023-11-06 ENCOUNTER — PATIENT OUTREACH (OUTPATIENT)
Dept: CARE COORDINATION | Facility: CLINIC | Age: 53
End: 2023-11-06
Payer: COMMERCIAL

## 2023-11-13 ENCOUNTER — THERAPY VISIT (OUTPATIENT)
Dept: PHYSICAL THERAPY | Facility: CLINIC | Age: 53
End: 2023-11-13
Payer: COMMERCIAL

## 2023-11-13 DIAGNOSIS — M62.89 PELVIC FLOOR DYSFUNCTION: ICD-10-CM

## 2023-11-13 DIAGNOSIS — N39.492 POSTURAL URINARY INCONTINENCE: Primary | ICD-10-CM

## 2023-11-13 PROCEDURE — 97110 THERAPEUTIC EXERCISES: CPT | Mod: GP | Performed by: PHYSICAL THERAPIST

## 2023-11-13 PROCEDURE — 97530 THERAPEUTIC ACTIVITIES: CPT | Mod: GP | Performed by: PHYSICAL THERAPIST

## 2023-11-17 PROBLEM — M54.50 ACUTE LOW BACK PAIN, UNSPECIFIED BACK PAIN LATERALITY, UNSPECIFIED WHETHER SCIATICA PRESENT: Status: ACTIVE | Noted: 2023-11-17

## 2023-12-04 ENCOUNTER — PATIENT OUTREACH (OUTPATIENT)
Dept: CARE COORDINATION | Facility: CLINIC | Age: 53
End: 2023-12-04
Payer: COMMERCIAL

## 2023-12-08 ENCOUNTER — ANCILLARY PROCEDURE (OUTPATIENT)
Dept: MAMMOGRAPHY | Facility: CLINIC | Age: 53
End: 2023-12-08
Attending: NURSE PRACTITIONER
Payer: COMMERCIAL

## 2023-12-08 DIAGNOSIS — Z12.31 VISIT FOR SCREENING MAMMOGRAM: ICD-10-CM

## 2023-12-08 PROCEDURE — 77067 SCR MAMMO BI INCL CAD: CPT | Mod: GC

## 2023-12-08 PROCEDURE — 77063 BREAST TOMOSYNTHESIS BI: CPT | Mod: GC

## 2024-01-03 ENCOUNTER — MYC MEDICAL ADVICE (OUTPATIENT)
Dept: FAMILY MEDICINE | Facility: CLINIC | Age: 54
End: 2024-01-03
Payer: COMMERCIAL

## 2024-01-29 ENCOUNTER — THERAPY VISIT (OUTPATIENT)
Dept: PHYSICAL THERAPY | Facility: CLINIC | Age: 54
End: 2024-01-29
Payer: COMMERCIAL

## 2024-01-29 DIAGNOSIS — N39.492 POSTURAL URINARY INCONTINENCE: Primary | ICD-10-CM

## 2024-01-29 DIAGNOSIS — M62.89 PELVIC FLOOR DYSFUNCTION: ICD-10-CM

## 2024-01-29 PROCEDURE — 97530 THERAPEUTIC ACTIVITIES: CPT | Mod: GP | Performed by: PHYSICAL THERAPIST

## 2024-01-29 PROCEDURE — 97140 MANUAL THERAPY 1/> REGIONS: CPT | Mod: GP | Performed by: PHYSICAL THERAPIST

## 2024-02-12 ENCOUNTER — PATIENT OUTREACH (OUTPATIENT)
Dept: ONCOLOGY | Facility: CLINIC | Age: 54
End: 2024-02-12
Payer: COMMERCIAL

## 2024-02-12 ENCOUNTER — MYC MEDICAL ADVICE (OUTPATIENT)
Dept: FAMILY MEDICINE | Facility: CLINIC | Age: 54
End: 2024-02-12
Payer: COMMERCIAL

## 2024-02-12 DIAGNOSIS — Z84.81 FAMILY HISTORY OF BRCA GENE MUTATION: Primary | ICD-10-CM

## 2024-02-12 NOTE — PROGRESS NOTES
Writer received Cancer Risk Management Program referral, referred for:    first degree relative BRCA 1 mutation      Reviewed for appropriate plan, and sent to New Patient Scheduling for completion.

## 2024-02-29 ENCOUNTER — MYC MEDICAL ADVICE (OUTPATIENT)
Dept: FAMILY MEDICINE | Facility: CLINIC | Age: 54
End: 2024-02-29
Payer: COMMERCIAL

## 2024-02-29 DIAGNOSIS — Z84.81 FAMILY HISTORY OF BRCA GENE MUTATION: Primary | ICD-10-CM

## 2024-03-04 ENCOUNTER — PATIENT OUTREACH (OUTPATIENT)
Dept: ONCOLOGY | Facility: CLINIC | Age: 54
End: 2024-03-04
Payer: COMMERCIAL

## 2024-03-04 NOTE — PROGRESS NOTES
We rec'd a genetic counseling referral for patient.  In review of the referral and her chart it is noted that the referral is for [facility].  Referral sent to NPS (new patient scheduling) team to fax the referral via instructions enclosed.    I also noted patient has an appointment with one of our Genetic counselors at .  I left a detailed vm for patient to call our NPS (new patient scheduling) team if she wished to cancel that.  I also let her know we are faxing her referral to [facility] per her instructions.

## 2024-03-04 NOTE — TELEPHONE ENCOUNTER
"This is in referrals. Can we print the \"referral\" and fax it to that place? Or find out if they have a form that I need to complete?  MELANIE Linton   "

## 2024-03-10 SDOH — HEALTH STABILITY: PHYSICAL HEALTH: ON AVERAGE, HOW MANY DAYS PER WEEK DO YOU ENGAGE IN MODERATE TO STRENUOUS EXERCISE (LIKE A BRISK WALK)?: 7 DAYS

## 2024-03-10 SDOH — HEALTH STABILITY: PHYSICAL HEALTH: ON AVERAGE, HOW MANY MINUTES DO YOU ENGAGE IN EXERCISE AT THIS LEVEL?: 40 MIN

## 2024-03-10 ASSESSMENT — SOCIAL DETERMINANTS OF HEALTH (SDOH): HOW OFTEN DO YOU GET TOGETHER WITH FRIENDS OR RELATIVES?: ONCE A WEEK

## 2024-03-15 ENCOUNTER — MYC MEDICAL ADVICE (OUTPATIENT)
Dept: FAMILY MEDICINE | Facility: CLINIC | Age: 54
End: 2024-03-15

## 2024-03-15 ENCOUNTER — OFFICE VISIT (OUTPATIENT)
Dept: FAMILY MEDICINE | Facility: CLINIC | Age: 54
End: 2024-03-15
Payer: COMMERCIAL

## 2024-03-15 VITALS
HEART RATE: 85 BPM | RESPIRATION RATE: 16 BRPM | DIASTOLIC BLOOD PRESSURE: 58 MMHG | OXYGEN SATURATION: 99 % | HEIGHT: 63 IN | BODY MASS INDEX: 23.04 KG/M2 | WEIGHT: 130 LBS | TEMPERATURE: 98.2 F | SYSTOLIC BLOOD PRESSURE: 104 MMHG

## 2024-03-15 DIAGNOSIS — I10 BENIGN ESSENTIAL HYPERTENSION: ICD-10-CM

## 2024-03-15 DIAGNOSIS — Z00.00 ROUTINE GENERAL MEDICAL EXAMINATION AT A HEALTH CARE FACILITY: Primary | ICD-10-CM

## 2024-03-15 DIAGNOSIS — Z84.81 FAMILY HISTORY OF BRCA GENE MUTATION: ICD-10-CM

## 2024-03-15 DIAGNOSIS — M54.50 ACUTE LOW BACK PAIN, UNSPECIFIED BACK PAIN LATERALITY, UNSPECIFIED WHETHER SCIATICA PRESENT: ICD-10-CM

## 2024-03-15 DIAGNOSIS — Z83.438 FAMILY HISTORY OF OTHER DISORDER OF LIPOPROTEIN METABOLISM AND OTHER LIPIDEMIA: Primary | ICD-10-CM

## 2024-03-15 DIAGNOSIS — Z13.220 LIPID SCREENING: ICD-10-CM

## 2024-03-15 PROCEDURE — 90471 IMMUNIZATION ADMIN: CPT | Performed by: NURSE PRACTITIONER

## 2024-03-15 PROCEDURE — 99396 PREV VISIT EST AGE 40-64: CPT | Mod: 25 | Performed by: NURSE PRACTITIONER

## 2024-03-15 PROCEDURE — 90746 HEPB VACCINE 3 DOSE ADULT IM: CPT | Performed by: NURSE PRACTITIONER

## 2024-03-15 PROCEDURE — 99213 OFFICE O/P EST LOW 20 MIN: CPT | Mod: 25 | Performed by: NURSE PRACTITIONER

## 2024-03-15 RX ORDER — LISINOPRIL 10 MG/1
10 TABLET ORAL DAILY
Qty: 90 TABLET | Refills: 3 | Status: SHIPPED | OUTPATIENT
Start: 2024-03-15

## 2024-03-15 NOTE — PATIENT INSTRUCTIONS
"[facility] physical therapy at [place]    Do strength training    Floss your teeth!     For \"family history BRCA gene\" and \"individualized breast cancer screening recommendations\"  Schedule with Brandy Magana  please call [phone]     Hep B vaccination  three total doses  1 - today  2  - 1 month  3 - 6 months  It is ok if dose 2 and 3 are given later, but they can not be given earlier than the recommended schedule. Later vaccination doesn't reduse your immunity, but you may not have full immunity until you get all the doses.  The first and second need to be six months apart and the second and third need to be two months apart    "

## 2024-03-15 NOTE — PROGRESS NOTES
Preventive Care Visit  [facility]  MANUEL Lopez CNP, Nurse Practitioner - Family  Mar 15, 2024      Assessment & Plan     Routine general medical examination at a health care facility  Will schedule fasting lab appointment. 1st dose Hep B given today.     Acute low back pain, unspecified back pain laterality, unspecified whether sciatica present   PT referral updated    Lipid screening  Will schedule future fasting  - Lipid panel reflex to direct LDL Fasting    Family history of BRCA gene mutation  Has testing kit- needs to complete and mail back.     Benign essential hypertension  Well controlled, no dizziness. Continue to monitor.   - Basic metabolic panel  (Ca, Cl, CO2, Creat, Gluc, K, Na, BUN)  - Albumin Random Urine Quantitative with Creat Ratio  - lisinopril (ZESTRIL) 10 MG tablet  Dispense: 90 tablet; Refill: 3      Patient has been advised of split billing requirements and indicates understanding: Yes  Prescription drug management      Counseling  Appropriate preventive services were discussed with this patient, including applicable screening as appropriate for fall prevention, nutrition, physical activity, Tobacco-use cessation, weight loss and cognition.  Checklist reviewing preventive services available has been given to the patient.  Reviewed patient's diet, addressing concerns and/or questions.   She is at risk for psychosocial distress and has been provided with information to reduce risk.           Kaelyn Carrillo is a 53 year old, presenting for the following:    Physical    Takes /70s at home.     Mom got breast cancer 3 months ago. Pt is main support for mom with appointments.     Has neck and L shoulder pain- had a PT referral but didn't follow through due to mom's cancer diagnosis. Sees chiropractor, does craniosacral therapy- helpful with neck.     Has hx of colon polyps- has colonoscopy scheduled for this summer.     Is working on  completing genetic screening for breast cancer.     Had partial hysterectomy due to fibroid- ovaries and cervix intact    Sleep: 6 hours a night. Would like 7 hours. Kept awake by neck and shoulder pain.   Exercise: walks dog for an hour each day. Intermittently goes to gym.   Diet: mainly eats plant based diet. Gets enough protein.   Alcohol: rarely- monthly  Tobacco: no  Drug: no  Sex: no concerns. Monogamous. No concerns/desire for STI screening.     Covid Modern[facility] Sept 2023        3/15/2024    10:11 AM   Additional Questions   Roomed by [employer]        Health Care Directive  Patient does not have a Health Care Directive or Living Will: Patient states has Advance Directive and will bring in a copy to clinic.          3/10/2024   General Health   How would you rate your overall physical health? Good   Feel stress (tense, anxious, or unable to sleep) Only a little   (!) STRESS CONCERN      3/10/2024   Nutrition   Three or more servings of calcium each day? Yes   Diet: Regular (no restrictions)   How many servings of fruit and vegetables per day? 4 or more   How many sweetened beverages each day? 0-1         3/10/2024   Exercise   Days per week of moderate/strenous exercise 7 days   Average minutes spent exercising at this level 40 min         3/10/2024   Social Factors   Frequency of gathering with friends or relatives Once a week   Worry food won't last until get money to buy more No   Food not last or not have enough money for food? No   Do you have housing?  Yes   Are you worried about losing your housing? No   Lack of transportation? No   Unable to get utilities (heat,electricity)? No         3/10/2024   Fall Risk   Fallen 2 or more times in the past year? No   Trouble with walking or balance? No          3/10/2024   Dental   Dentist two times every year? Yes         3/10/2024   TB Screening   Were you born outside of [place]?  No         Today's PHQ-2 Score:       3/14/2024    11:46 AM   PHQ-2 ( 1999  [employer])   Q1: Little interest or pleasure in doing things 0   Q2: Feeling down, depressed or hopeless 0   PHQ-2 Score 0   Q1: Little interest or pleasure in doing things Not at all   Q2: Feeling down, depressed or hopeless Not at all   PHQ-2 Score 0           3/10/2024   Substance Use   Alcohol more than 3/day or more than 7/wk No   Do you use any other substances recreationally? No     Social History     Tobacco Use    Smoking status: Never    Smokeless tobacco: Never   Vaping Use    Vaping Use: Never used   Substance Use Topics    Alcohol use: Yes     Comment: 0-1 drink per week    Drug use: No           12/8/2023   LAST FHS-7 RESULTS   1st degree relative breast or ovarian cancer Yes   Any relative bilateral breast cancer No   Any male have breast cancer No   Any ONE woman have BOTH breast AND ovarian cancer No   Any woman with breast cancer before 50yrs No   2 or more relatives with breast AND/OR ovarian cancer Yes   2 or more relatives with breast AND/OR bowel cancer Yes        Mammogram Screening - Mammogram every 1-2 years updated in Health Maintenance based on mutual decision making        3/10/2024   STI Screening   New sexual partner(s) since last STI/HIV test? No     History of abnormal Pap smear: NO - age 30-65 PAP every 5 years with negative HPV co-testing recommended        Latest Ref Rng & Units 1/15/2021    10:45 AM 1/15/2021    10:37 AM 10/2/2015    10:45 AM   PAP / HPV   PAP (Historical)   NIL     HPV 16 DNA NEG^Negative Negative   Negative    HPV 18 DNA NEG^Negative Negative   Negative    Other HR HPV NEG^Negative Negative   Negative      ASCVD Risk   The 10-year ASCVD risk score (Traci KATE, et al., 2019) is: 1.4%    Values used to calculate the score:      Age: 53 years      Sex: Female      Is Non- : No      Diabetic: No      Tobacco smoker: No      Systolic Blood Pressure: 104 mmHg      Is BP treated: Yes      HDL Cholesterol: 68 mg/dL      Total Cholesterol:  "230 mg/dL         Reviewed and updated as needed this visit by Provider                    Past Medical History:   Diagnosis Date    Allergies     Seasonal    Tension headache      Past Surgical History:   Procedure Laterality Date    C ANESTH, SECTION      LAPAROSCOPIC HYSTERECTOMY SUPRACERVICAL  2012    benign path         Review of Systems  Constitutional, neuro, ENT, endocrine, pulmonary, cardiac, gastrointestinal, genitourinary, musculoskeletal, integument and psychiatric systems are negative, except as otherwise noted.     Objective    Exam  /58 (BP Location: Right arm, Patient Position: Sitting, Cuff Size: Adult Regular)   Pulse 85   Temp 98.2  F (36.8  C) (Temporal)   Resp 16   Ht 1.596 m (5' 2.84\")   Wt 59 kg (130 lb)   LMP 2012   SpO2 99%   BMI 23.15 kg/m     Estimated body mass index is 23.15 kg/m  as calculated from the following:    Height as of this encounter: 1.596 m (5' 2.84\").    Weight as of this encounter: 59 kg (130 lb).    Physical Exam  GENERAL: alert and no distress  EYES: Eyes grossly normal to inspection, PERRL and conjunctivae and sclerae normal  HENT: ear canals and TM's normal, nose and mouth without ulcers or lesions  NECK: no adenopathy, no asymmetry, masses, or scars  RESP: lungs clear to auscultation - no rales, rhonchi or wheezes  BREAST: normal without masses, tenderness or nipple discharge and no palpable axillary masses or adenopathy  CV: regular rate and rhythm, normal S1 S2, no S3 or S4, no murmur, click or rub, no peripheral edema  ABDOMEN: soft, nontender, no hepatosplenomegaly, no masses and bowel sounds normal  MS: no gross musculoskeletal defects noted, no edema  SKIN: no suspicious lesions or rashes  NEURO: Normal strength and tone, mentation intact and speech normal  PSYCH: mentation appears normal, affect normal/bright      Exam and note completed by DNP student Iona Rey with oversight from MANUEL Lopez CNP      Signed " Electronically by: MANUEL Lopez CNP

## 2024-03-20 ENCOUNTER — LAB (OUTPATIENT)
Dept: LAB | Facility: CLINIC | Age: 54
End: 2024-03-20
Payer: COMMERCIAL

## 2024-03-20 DIAGNOSIS — Z13.220 LIPID SCREENING: ICD-10-CM

## 2024-03-20 DIAGNOSIS — I10 BENIGN ESSENTIAL HYPERTENSION: ICD-10-CM

## 2024-03-20 LAB
ANION GAP SERPL CALCULATED.3IONS-SCNC: 11 MMOL/L (ref 7–15)
BUN SERPL-MCNC: 20.6 MG/DL (ref 6–20)
CALCIUM SERPL-MCNC: 9.7 MG/DL (ref 8.6–10)
CHLORIDE SERPL-SCNC: 106 MMOL/L (ref 98–107)
CHOLEST SERPL-MCNC: 232 MG/DL
CREAT SERPL-MCNC: 0.68 MG/DL (ref 0.51–0.95)
CREAT UR-MCNC: 127 MG/DL
DEPRECATED HCO3 PLAS-SCNC: 27 MMOL/L (ref 22–29)
EGFRCR SERPLBLD CKD-EPI 2021: >90 ML/MIN/1.73M2
FASTING STATUS PATIENT QL REPORTED: YES
GLUCOSE SERPL-MCNC: 87 MG/DL (ref 70–99)
HDLC SERPL-MCNC: 65 MG/DL
LDLC SERPL CALC-MCNC: 147 MG/DL
MICROALBUMIN UR-MCNC: 13.5 MG/L
MICROALBUMIN/CREAT UR: 10.63 MG/G CR (ref 0–25)
NONHDLC SERPL-MCNC: 167 MG/DL
POTASSIUM SERPL-SCNC: 4.3 MMOL/L (ref 3.4–5.3)
SODIUM SERPL-SCNC: 144 MMOL/L (ref 135–145)
TRIGL SERPL-MCNC: 102 MG/DL

## 2024-03-20 PROCEDURE — 36415 COLL VENOUS BLD VENIPUNCTURE: CPT

## 2024-03-20 PROCEDURE — 82043 UR ALBUMIN QUANTITATIVE: CPT

## 2024-03-20 PROCEDURE — 80061 LIPID PANEL: CPT

## 2024-03-20 PROCEDURE — 80048 BASIC METABOLIC PNL TOTAL CA: CPT

## 2024-03-20 PROCEDURE — 82570 ASSAY OF URINE CREATININE: CPT

## 2024-03-21 ENCOUNTER — TRANSCRIBE ORDERS (OUTPATIENT)
Dept: OTHER | Age: 54
End: 2024-03-21

## 2024-03-21 ENCOUNTER — PATIENT OUTREACH (OUTPATIENT)
Dept: ONCOLOGY | Facility: CLINIC | Age: 54
End: 2024-03-21
Payer: COMMERCIAL

## 2024-03-21 ENCOUNTER — PRE VISIT (OUTPATIENT)
Dept: ONCOLOGY | Facility: CLINIC | Age: 54
End: 2024-03-21
Payer: COMMERCIAL

## 2024-03-21 ENCOUNTER — MYC MEDICAL ADVICE (OUTPATIENT)
Dept: ONCOLOGY | Facility: CLINIC | Age: 54
End: 2024-03-21
Payer: COMMERCIAL

## 2024-03-21 DIAGNOSIS — Z84.81 FAMILY HISTORY OF BRCA GENE MUTATION: Primary | ICD-10-CM

## 2024-03-21 NOTE — TELEPHONE ENCOUNTER
Action Taken  Date/Description  TJ     WT from NPS March 21, 2024 10:04 AM    Call from Pt to schedule for Dx of  Family history of BRCA gene mutation [Z84.81]     Pt Dx'd @  [facility] - Awaiting results in a few weeks  Has Pt been anywhere else for this condition/concern? [facility]  Records updated in Care Everywhere? [facility]  Prior history of Hematologist, Oncologist?  No  Genetic Testing conducted?  [facility] - Awaiting results in a few weeks  Any scheduled follow up's/imaging/surgical procedures/biopsies? No

## 2024-03-21 NOTE — PROGRESS NOTES
Writer placed call to patient, was unable to connect by phone, [facility] message sent for follow up. Patient called in self referral for [facility] with Rupal Wright. Has only recently submitted for genetic testing through [facility], see encounter in [place]. Patient hoping to schedule prior to receiving test results, mother has BRCA per patient report. MyChart sent to update her that we will need to wait for the genetic testing to be completed and available for review prior to being able to offer an appointment with our High Risk team. Referral deferred in queue pending receipt of results.

## 2024-03-22 ENCOUNTER — TRANSFERRED RECORDS (OUTPATIENT)
Dept: HEALTH INFORMATION MANAGEMENT | Facility: CLINIC | Age: 54
End: 2024-03-22

## 2024-03-25 NOTE — RESULT ENCOUNTER NOTE
Gerardo,    Your labs were all normal. The urea nitrogen is up a bit because you had been fasting and is not of concern. Cholesterol is at about the same as last year. At any point if you want to start a cholesterol medication, let me know. It isn't something I'd push with the overall low risk right now, but it is always something we can consider to lower cardiovascular risks.  If you have any questions, please feel free to contact the clinic.    MELANIE Linton    The 10-year ASCVD risk score (Traci KATE, et al., 2019) is: 1.4%    Values used to calculate the score:      Age: 53 years      Sex: Female      Is Non- : No      Diabetic: No      Tobacco smoker: No      Systolic Blood Pressure: 104 mmHg      Is BP treated: Yes      HDL Cholesterol: 65 mg/dL      Total Cholesterol: 232 mg/dL

## 2024-04-19 ENCOUNTER — ALLIED HEALTH/NURSE VISIT (OUTPATIENT)
Dept: FAMILY MEDICINE | Facility: CLINIC | Age: 54
End: 2024-04-19
Payer: COMMERCIAL

## 2024-04-19 DIAGNOSIS — Z23 ENCOUNTER FOR IMMUNIZATION: Primary | ICD-10-CM

## 2024-04-19 PROCEDURE — 90471 IMMUNIZATION ADMIN: CPT

## 2024-04-19 PROCEDURE — 90746 HEPB VACCINE 3 DOSE ADULT IM: CPT

## 2024-04-19 PROCEDURE — 99207 PR NO CHARGE NURSE ONLY: CPT

## 2024-04-19 NOTE — PROGRESS NOTES
Prior to immunization administration, verified patients identity using patient s name and date of birth. Please see Immunization Activity for additional information.     Screening Questionnaire for Adult Immunization    Are you sick today?   No   Do you have allergies to medications, food, a vaccine component or latex?   No   Have you ever had a serious reaction after receiving a vaccination?   No   Do you have a long-term health problem with heart, lung, kidney, or metabolic disease (e.g., diabetes), asthma, a blood disorder, no spleen, complement component deficiency, a cochlear implant, or a spinal fluid leak?  Are you on long-term aspirin therapy?   No   Do you have cancer, leukemia, HIV/AIDS, or any other immune system problem?   No   Do you have a parent, brother, or sister with an immune system problem?   No   In the past 3 months, have you taken medications that affect  your immune system, such as prednisone, other steroids, or anticancer drugs; drugs for the treatment of rheumatoid arthritis, Crohn s disease, or psoriasis; or have you had radiation treatments?   No   Have you had a seizure, or a brain or other nervous system problem?   No   During the past year, have you received a transfusion of blood or blood    products, or been given immune (gamma) globulin or antiviral drug?   No   For women: Are you pregnant or is there a chance you could become       pregnant during the next month?   No   Have you received any vaccinations in the past 4 weeks?   No     Immunization questionnaire answers were all negative.    I have reviewed the following standing orders:   This patient is due and qualifies for the Hepatitis B vaccine.    Click here for Hepatitis B Standing Order    I have reviewed the vaccines inclusion and exclusion criteria; No concerns regarding eligibility.     Patient instructed to remain in clinic for 15 minutes afterwards, and to report any adverse reactions.     Screening performed by Gilma  Eddie on 4/19/2024 at 10:09 AM.

## 2024-06-07 ENCOUNTER — OFFICE VISIT (OUTPATIENT)
Dept: DERMATOLOGY | Facility: CLINIC | Age: 54
End: 2024-06-07
Payer: COMMERCIAL

## 2024-06-07 DIAGNOSIS — L82.1 SEBORRHEIC KERATOSES: ICD-10-CM

## 2024-06-07 DIAGNOSIS — L81.4 SOLAR LENTIGO: ICD-10-CM

## 2024-06-07 DIAGNOSIS — D22.9 MULTIPLE BENIGN NEVI: ICD-10-CM

## 2024-06-07 DIAGNOSIS — D18.01 CHERRY ANGIOMA: ICD-10-CM

## 2024-06-07 DIAGNOSIS — D49.2 NEOPLASM OF UNSPECIFIED BEHAVIOR OF BONE, SOFT TISSUE, AND SKIN: ICD-10-CM

## 2024-06-07 DIAGNOSIS — Z12.83 SKIN CANCER SCREENING: Primary | ICD-10-CM

## 2024-06-07 PROCEDURE — 11102 TANGNTL BX SKIN SINGLE LES: CPT | Performed by: DERMATOLOGY

## 2024-06-07 PROCEDURE — 99213 OFFICE O/P EST LOW 20 MIN: CPT | Mod: 25 | Performed by: DERMATOLOGY

## 2024-06-07 PROCEDURE — 88305 TISSUE EXAM BY PATHOLOGIST: CPT | Mod: 26 | Performed by: DERMATOLOGY

## 2024-06-07 PROCEDURE — 88305 TISSUE EXAM BY PATHOLOGIST: CPT | Mod: TC | Performed by: DERMATOLOGY

## 2024-06-07 ASSESSMENT — PAIN SCALES - GENERAL: PAINLEVEL: NO PAIN (0)

## 2024-06-07 NOTE — NURSING NOTE
Lidocaine-epinephrine 1-1:550008 % injection   0.1 mL once for one use, starting 6/7/2024 ending 6/7/2024,  2mL disp, R-0, injection  Injected by Avtar DIAZ CMA

## 2024-06-07 NOTE — NURSING NOTE
Dermatology Rooming Note    Gerardo Mandujano's goals for this visit include:   Chief Complaint   Patient presents with    Skin Check     FBSE- no new spots or lesions of concern      Avtar DIAZ CMA

## 2024-06-07 NOTE — PROGRESS NOTES
[facility] Dermatology Note  Encounter Date: Jun 7, 2024  Office Visit     Dermatology Problem List:  1. Family history melanoma (father).  2. Acrochordon, right posterior thigh, s/p biopsy 7/16/21    # Lesion to monitor-R inner thigh 4 x 4.5 mm    # Neoplasm of uncertain behavior, L proximal forearm, s/p shave bx 6/7/24    ____________________________________________    Assessment & Plan:       # Neoplasm of Unspecified Behavior on L proximal forearm. Differential diagnosis favors lentigo, and includes dysplastic nevus vs melanoma.  - shave biopsy performed today, see procedure note below    # Benign lesions - SKs, cherry angiomas, lentigenes.  - No treatment required    # Multiple benign nevi.   # Fhx melanoma.  - Monitor for ABCDEs of melanoma   - Continue sun protection - recommend SPF 30 or higher with frequent application   - Return sooner if noticing changing or symptomatic lesions      Procedures Performed:   - Shave biopsy: After discussion of benefits and risks including but not limited to bleeding, infection, scar, incomplete removal, recurrence, and non-diagnostic biopsy, written consent and photographs were obtained. The area was cleaned with isopropyl alcohol. < 1mL of 1% lidocaine with epinephrine was injected to obtain adequate anesthesia. A shave biopsy was performed. Hemostasis was achieved with aluminium chloride. Vaseline and a sterile dressing were applied. The patient tolerated the procedure and no complications were noted. The patient was provided with verbal and written post care instructions.       Follow-up: 1 year(s) in-person, or earlier for new or changing lesions    Staff and Scribe:  Scribe Disclosure:   By signing my name below, I, Shannan Yeager, attest that this documentation has been prepared under the direction and in the presence of Luz Figueroa MD.  - Electronically Signed: Shannan Yeager 06/07/24       Provider Disclosure:   The documentation recorded by  the scribe accurately reflects the services I personally performed and the decisions made by me.    Luz Figueroa MD    Department of Dermatology  [facility]: Phone: [phone], Fax: [phone]  6/12/2024       ____________________________________________    CC: Skin Check (FBSE- no new spots or lesions of concern )    HPI:  Ms. Gerardo Mandujano is a(n) 53 year old female who presents today as a return patient for [facility]. Last seen by me on 02/17/2023.    Patient has no areas of concern today.    Patient is otherwise feeling well, without additional skin concerns.    Labs Reviewed:  N/A    Physical exam:  Vitals: LMP 11/05/2012   GEN: This is a well developed, well-nourished female in no acute distress, in a pleasant mood.    SKIN: Total skin excluding the undergarment areas was performed. The exam included the head/face, neck, both arms, chest, back, abdomen, both legs, digits and/or nails.   - L proximal forearm 3 mm irregular brown macule  - There are dome shaped bright red papules on the trunk and extremities .   - Multiple regular brown pigmented macules and papules are identified on the trunk and extremities. .   - Scattered brown macules on sun exposed areas.  - Waxy stuck on papules and plaques on trunk and extremities.   - R inner thigh 4 x 4.5 mm even light brown macule superiorly with slight pedicle, dermoscopy very evenly reticulated   - No other lesions of concern on areas examined.       Medications:  Current Outpatient Medications   Medication Sig Dispense Refill    Calcium-Magnesium-Zinc 333-133-5 MG TABS per tablet Take 1 tablet by mouth daily      diclofenac (VOLTAREN) 1 % topical gel Apply 2 g topically 4 times daily 350 g 1    ibuprofen 200 MG capsule Take 200 mg by mouth as needed      lisinopril (ZESTRIL) 10 MG tablet Take 1 tablet (10 mg) by mouth daily 90 tablet 3    VITAMIN D, CHOLECALCIFEROL, PO  Take  by mouth daily.       No current facility-administered medications for this visit.      Past Medical History:   Patient Active Problem List   Diagnosis    CARDIOVASCULAR SCREENING; LDL GOAL LESS THAN 160    Headache    Spasm of muscle    Arthralgia of temporomandibular joint    Benign essential hypertension    Left leg weakness    Weakness of left foot    Postural urinary incontinence    Pelvic floor dysfunction    Acute low back pain, unspecified back pain laterality, unspecified whether sciatica present     Past Medical History:   Diagnosis Date    Allergies     Seasonal    Tension headache         CC Referred Self, MD  No address on file on close of this encounter.

## 2024-06-07 NOTE — LETTER
6/7/2024       RE: Gerardo Mandujano  [address]  [facility] [address]     Dear Colleague,    Thank you for referring your patient, Gerardo Mandujano, to the [facility] DERMATOLOGY [facility] [place] at [facility]. Please see a copy of my visit note below.    [facility] Dermatology Note  Encounter Date: Jun 7, 2024  Office Visit     Dermatology Problem List:  1. Family history melanoma (father).  2. Acrochordon, right posterior thigh, s/p biopsy 7/16/21    # Lesion to monitor-R inner thigh 4 x 4.5 mm    # Neoplasm of uncertain behavior, L proximal forearm, s/p shave bx 6/7/24    ____________________________________________    Assessment & Plan:       # Neoplasm of Unspecified Behavior on L proximal forearm. Differential diagnosis favors lentigo, and includes dysplastic nevus vs melanoma.  - shave biopsy performed today, see procedure note below    # Benign lesions - SKs, cherry angiomas, lentigenes.  - No treatment required    # Multiple benign nevi.   # Fhx melanoma.  - Monitor for ABCDEs of melanoma   - Continue sun protection - recommend SPF 30 or higher with frequent application   - Return sooner if noticing changing or symptomatic lesions      Procedures Performed:   - Shave biopsy: After discussion of benefits and risks including but not limited to bleeding, infection, scar, incomplete removal, recurrence, and non-diagnostic biopsy, written consent and photographs were obtained. The area was cleaned with isopropyl alcohol. < 1mL of 1% lidocaine with epinephrine was injected to obtain adequate anesthesia. A shave biopsy was performed. Hemostasis was achieved with aluminium chloride. Vaseline and a sterile dressing were applied. The patient tolerated the procedure and no complications were noted. The patient was provided with verbal and written post care instructions.       Follow-up: 1 year(s) in-person, or earlier for new or changing  lesions    Staff and Scribe:  Scribe Disclosure:   By signing my name below, I, Shannan Yeager, attest that this documentation has been prepared under the direction and in the presence of Luz Figueroa MD.  - Electronically Signed: Shannan Yeager 06/07/24       Provider Disclosure:   The documentation recorded by the scribe accurately reflects the services I personally performed and the decisions made by me.    Luz Figueroa MD    Department of Dermatology  [facility]: Phone: [phone], Fax: [phone]  6/12/2024       ____________________________________________    CC: Skin Check (FBSE- no new spots or lesions of concern )    HPI:  Ms. Gerardo Mandujano is a(n) 53 year old female who presents today as a return patient for [facility]. Last seen by me on 02/17/2023.    Patient has no areas of concern today.    Patient is otherwise feeling well, without additional skin concerns.    Labs Reviewed:  N/A    Physical exam:  Vitals: LMP 11/05/2012   GEN: This is a well developed, well-nourished female in no acute distress, in a pleasant mood.    SKIN: Total skin excluding the undergarment areas was performed. The exam included the head/face, neck, both arms, chest, back, abdomen, both legs, digits and/or nails.   - L proximal forearm 3 mm irregular brown macule  - There are dome shaped bright red papules on the trunk and extremities .   - Multiple regular brown pigmented macules and papules are identified on the trunk and extremities. .   - Scattered brown macules on sun exposed areas.  - Waxy stuck on papules and plaques on trunk and extremities.   - R inner thigh 4 x 4.5 mm even light brown macule superiorly with slight pedicle, dermoscopy very evenly reticulated   - No other lesions of concern on areas examined.       Medications:  Current Outpatient Medications   Medication Sig Dispense Refill    Calcium-Magnesium-Zinc 333-133-5  MG TABS per tablet Take 1 tablet by mouth daily      diclofenac (VOLTAREN) 1 % topical gel Apply 2 g topically 4 times daily 350 g 1    ibuprofen 200 MG capsule Take 200 mg by mouth as needed      lisinopril (ZESTRIL) 10 MG tablet Take 1 tablet (10 mg) by mouth daily 90 tablet 3    VITAMIN D, CHOLECALCIFEROL, PO Take  by mouth daily.       No current facility-administered medications for this visit.      Past Medical History:   Patient Active Problem List   Diagnosis    CARDIOVASCULAR SCREENING; LDL GOAL LESS THAN 160    Headache    Spasm of muscle    Arthralgia of temporomandibular joint    Benign essential hypertension    Left leg weakness    Weakness of left foot    Postural urinary incontinence    Pelvic floor dysfunction    Acute low back pain, unspecified back pain laterality, unspecified whether sciatica present     Past Medical History:   Diagnosis Date    Allergies     Seasonal    Tension headache         CC Referred Self,    No

## 2024-06-07 NOTE — PATIENT INSTRUCTIONS
Checking for Skin Cancer  You can help find cancer early by checking your skin each month. There are 3 main kinds of skin cancer: melanoma, basal cell carcinoma, and squamous cell carcinoma. Doing monthly skin checks is the best way to find new marks, sores, or skin changes. Follow these instructions for checking your skin.   The ABCDEs of checking moles for melanoma   Check your moles or growths for signs of melanoma using ABCDE:   Asymmetry: The sides of the mole or growth don t match.  Border: The edges are ragged, notched, or blurred.  Color: The color within the mole or growth varies. It could be black, brown, tan, white, or shades of red, gray, or blue.  Diameter: The mole or growth is larger than   inch or 6 mm (size of a pencil eraser).  Evolving: The size, shape, texture, or color of the mole or growth is changing.     ABCDE's of moles on light skin.        ABCDE's of moles on dark skin may be harder to identify.     Checking for other types of skin cancer  Basal cell carcinoma or squamous cell carcinoma cause symptoms like:     A spot or mole that looks different from all other marks on your skin  Changes in how an area feels, such as itching, tenderness, or pain  Changes in the skin's surface, such as oozing, bleeding, or scaliness  A sore that doesn't heal  New swelling, redness, or spread of color beyond the border of a mole    Who s at risk?  Anyone of any skin color can get skin cancer. But you're at greater risk if you have:   Fair skin that freckles easily and burns instead of tanning  Light-colored or red hair  Light-colored eyes  Many moles or abnormal moles on your skin  A long history of unprotected exposure to sunlight or tanning beds  A history of many blistering sunburns as a child or teen  A family history of skin cancer  Been exposed to radiation or chemicals  A weakened immune system  Been exposed to arsenic  If you've had skin cancer in the past, you're at high risk of having it again.    How to check your skin  Do your monthly skin checkups in front of a full-length mirror. Use a room with good lighting so it's easier to see. Use a hand mirror to look at hard-to-see places like your buttocks and back. You can also have a trusted friend or family member help you with these checks. Check every part of your body, including your:   Head (ears, face, neck, and scalp)  Torso (front, back, sides, and under breasts)  Arms (tops, undersides, and armpits)  Hands (palms, backs, and fingers, including under the nails)  Lower back, buttocks, and genitals  Legs (front, back, and sides)  Feet (tops, soles, toes, including under the nails, and between toes)  Watch for new spots on your skin or a spot that's changing in color, shape, size.   If you have a lot of moles, take digital photos of them each month. Make sure to take photos both up close and from a distance. These can help you see if any moles change over time.   Know your skin  Most skin changes aren't cancer. But if you see any changes in your skin, call your healthcare provider right away. Only they can tell you if a change is a problem. If you have skin cancer, seeing your provider can be the first step to getting the treatment that could save your life.   Dain last reviewed this educational content on 10/1/2021    7146-4626 [employer]. All rights reserved. This information is not intended as a substitute for professional medical care. Always follow your healthcare professional's instructions.     Wound Care After a Biopsy    What is a skin biopsy?  A skin biopsy allows the doctor to examine a very small piece of tissue under the microscope to determine the diagnosis and the best treatment for the skin condition. A local anesthetic (numbing medicine) is injected with a very small needle into the skin area to be tested. A small piece of skin is taken from the area. Sometimes a suture (stitch) is used.     What are the risks of a skin  biopsy?  I will experience scar, bleeding, swelling, pain, crusting and redness. I may experience incomplete removal or recurrence. Risks of this procedure are excessive bleeding, bruising, infection, nerve damage, numbness, thick (hypertrophic or keloidal) scar and non-diagnostic biopsy.    How should I care for my wound for the first 24 hours?  Keep the wound dry and covered for 24 hours  If it bleeds, hold direct pressure on the area for 15 minutes. If bleeding does not stop, call us or go to the emergency room  Avoid strenuous exercise the first 1-2 days or as your doctor instructs you    How should I care for the wound after 24 hours?  After 24 hours, remove the bandage  You may bathe or shower as normal  If you had a scalp biopsy, you can shampoo as usual and can use shower water to clean the biopsy site daily  Clean the wound once a day with gentle soap and water  Do not scrub, be gentle  Apply white petroleum/Vaseline after cleaning the wound with a cotton swab or a clean finger, and keep the site covered with a Bandaid /bandage. Bandages are not necessary with a scalp biopsy  If you are unable to cover the site with a Bandaid /bandage, re-apply ointment 2-3 times a day to keep the site moist. Moisture will help with healing  Avoid strenuous activity for first 1-2 days  Avoid lakes, rivers, pools, and oceans until the stitches are removed or the site is healed    How do I clean my wound?  Wash hands thoroughly with soap or use hand  before all wound care  Clean the wound with gentle soap and water  Apply white petroleum/Vaseline  to wound after it is clean  Replace the Bandaid /bandage to keep the wound covered for the first few days or as instructed by your doctor  If you had a scalp biopsy, warm shower water to the area on a daily basis should suffice    What should I use to clean my wound?   Cotton-tipped applicators (Qtips )  White petroleum jelly (Vaseline ). Use a clean new container and use  Q-tips to apply.  Bandaids  as needed  Gentle soap     How should I care for my wound long term?  Do not get your wound dirty  Keep up with wound care for one week or until the area is healed.  A small scab will form and fall off by itself when the area is completely healed. The area will be red and will become pink in color as it heals. Sun protection is very important for how your scar will turn out. Sunscreen with an SPF 30 or greater is recommended once the area is healed.  You should have some soreness but it should be mild and slowly go away over several days. Talk to your doctor about using tylenol for pain,    When should I call my doctor?  If you have increased:   Pain or swelling  Pus or drainage (clear or slightly yellow drainage is ok)  Temperature over 100F  Spreading redness or warmth around wound    When will I hear about my results?  The biopsy results can take 2 weeks to come back.  Your results will automatically release to [employer] before your provider has even reviewed them.  The clinic will call you with the results, send you a [employer] message, or have you schedule a follow-up clinic or phone time to discuss the results.  Contact our clinics if you do not hear from us in 2 weeks.    Who should I call with questions?  [facility]: [phone]  [facility]: [phone]  For urgent needs outside of business hours call the [facility] at [phone] and ask for the dermatology resident on call

## 2024-06-10 LAB
PATH REPORT.COMMENTS IMP SPEC: NORMAL
PATH REPORT.FINAL DX SPEC: NORMAL
PATH REPORT.GROSS SPEC: NORMAL
PATH REPORT.MICROSCOPIC SPEC OTHER STN: NORMAL
PATH REPORT.RELEVANT HX SPEC: NORMAL

## 2024-06-11 ENCOUNTER — TELEPHONE (OUTPATIENT)
Dept: DERMATOLOGY | Facility: CLINIC | Age: 54
End: 2024-06-11
Payer: COMMERCIAL

## 2024-06-11 DIAGNOSIS — L81.8 ATYPICAL MELANOCYTIC HYPERPLASIA: Primary | ICD-10-CM

## 2024-06-11 NOTE — TELEPHONE ENCOUNTER
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     Rosario Selby LPN

## 2024-06-13 ENCOUNTER — TELEPHONE (OUTPATIENT)
Dept: DERMATOLOGY | Facility: CLINIC | Age: 54
End: 2024-06-13
Payer: COMMERCIAL

## 2024-06-13 NOTE — TELEPHONE ENCOUNTER
History of Present Illness:   History Present Illness:  Reason for surgery: GERD   HPI:    36M w/ morbid obesity with BMI 53 (class 3) . EGD for GERD today.  PMH: Knee pain, H. pylori +  (treated)  PSH: hand surgery  FH: unremarkable  SH: ex-smoker (2021), no alcohol, no drugs  Meds: omeprazole 40  All: NKDA    Review of Systems:  Constitutional: No subjective fever, No lethargy  Eyes: No vision change  Head/Neck: No head pain, no neck pain  Respiratory/Thorax: No difficulty breathing, no chest pain  Cardiovascular: No chest pain, no palpitations  Gastrointestinal: No nausea, no vomiting, no abdominal pain, no diarrhea  Genitourinary: No difficulty voiding  Musculoskeletal: No muscle pain, +joint pain  Neurological: No sensory deficit, no paresthesia   Psychological: No mood change  Skin: No rash, No itching        Allergies:        Allergies:  ·  No Known Allergies :     Home Medication Review:   Home Medications Reviewed: yes     Impression/Procedure:   ·  Impression and Planned Procedure: 36M w/ morbid obesity with BMI 53 (class 3). EGD for GERD today.       ERAS (Enhanced Recovery After Surgery):  ·  ERAS Patient: no                   Physical Exam by System:    Constitutional: Well developed, awake/alert/oriented  x3, no distress, alert and cooperative   Eyes: PERRL, EOMI, clear sclera   ENMT: mucous membranes moist, no apparent injury,  no lesions seen   Head/Neck: Neck supple, no apparent injury, thyroid  without mass or tenderness, No JVD   Respiratory/Thorax: Patent airways, CTAB, normal  breath sounds with good chest expansion, thorax symmetric   Cardiovascular: Regular, rate and rhythm, no murmurs,  warm extremities   Gastrointestinal: Nondistended, soft, non-tender,  no rebound tenderness or guarding, no masses palpable, no organomegaly   Extremities: normal extremities, no cyanosis edema,  contusions or wounds, no clubbing   Neurological: alert and oriented x3, intact senses,  motor, response and  Left patient a voicemail to schedule an excision for   Excision,  atypical junctional melanocytic hyperplasia, left proximal forearm        with Dr. Bassett. Provided my direct phone number.    Josette Koehler on 6/13/2024 at 1:44 PM       reflexes, normal strength   Skin: Warm and dry, no lesions, no rashes     Consent:   COVID-19 Consent:  ·  COVID-19 Risk Consent Surgeon has reviewed key risks related to the risk of adarsh COVID-19 and if they contract COVID-19 what the risks are.       Electronic Signatures:  Dante Odom)  (Signed 30-Aug-2023 07:29)   Authored: HI   Co-Signer: History of Present Illness, Allergies, Home Medication Review, Impression/Procedure, ERAS, Physical Exam, Consent, Note Completion  Savanna Marcial)  (Signed 29-Aug-2023 10:52)   Authored: History of Present Illness, Allergies, Home  Medication Review, Impression/Procedure, ERAS, Physical Exam, Consent, Note Completion      Last Updated: 30-Aug-2023 07:29 by Dante Odom)

## 2024-06-14 NOTE — TELEPHONE ENCOUNTER
Called patient to schedule surgery with Dr. Bassett    Date of Surgery: 08/21    Surgery type: excision     Consult scheduled: No    Has patient had mohs with us before? Not Applicable    Additional comments: waiting until 08/21 per pt preference due to travel.       Josette Koehler on 6/14/2024 at 9:01 AM

## 2024-07-08 ENCOUNTER — TRANSFERRED RECORDS (OUTPATIENT)
Dept: HEALTH INFORMATION MANAGEMENT | Facility: CLINIC | Age: 54
End: 2024-07-08

## 2024-08-21 ENCOUNTER — OFFICE VISIT (OUTPATIENT)
Dept: DERMATOLOGY | Facility: CLINIC | Age: 54
End: 2024-08-21
Payer: COMMERCIAL

## 2024-08-21 VITALS — DIASTOLIC BLOOD PRESSURE: 82 MMHG | HEART RATE: 84 BPM | SYSTOLIC BLOOD PRESSURE: 129 MMHG

## 2024-08-21 DIAGNOSIS — L81.8 ATYPICAL MELANOCYTIC HYPERPLASIA: Primary | ICD-10-CM

## 2024-08-21 PROCEDURE — 12032 INTMD RPR S/A/T/EXT 2.6-7.5: CPT | Mod: GC | Performed by: DERMATOLOGY

## 2024-08-21 PROCEDURE — 11402 EXC TR-EXT B9+MARG 1.1-2 CM: CPT | Mod: GC | Performed by: DERMATOLOGY

## 2024-08-21 PROCEDURE — 88305 TISSUE EXAM BY PATHOLOGIST: CPT | Mod: 26 | Performed by: DERMATOLOGY

## 2024-08-21 PROCEDURE — 88305 TISSUE EXAM BY PATHOLOGIST: CPT | Mod: TC | Performed by: DERMATOLOGY

## 2024-08-21 ASSESSMENT — PAIN SCALES - GENERAL: PAINLEVEL: NO PAIN (0)

## 2024-08-21 NOTE — PROGRESS NOTES
DERMATOLOGY EXCISION PROCEDURE NOTE    Dermatology Problem List:  1. Family history melanoma (father).  2. Acrochordon, right posterior thigh, s/p biopsy 7/16/21  3. Atypical junctional melanocytic hyperplasia, left proximal forearm, s/p bx 6/7/24, s/p excision 8/21/24    # Lesion to monitor-R inner thigh 4 x 4.5 mm      NAME OF PROCEDURE: Excision intermediate layered linear closure  Staff surgeon: Chriss Bassett MD  Fellow: Yunior Patel MD  Scrub Nurse: Lg    PRE-OPERATIVE DIAGNOSIS:   Atypical junctional melanocytic hyperplasia   POST-OPERATIVE DIAGNOSIS: Same   LOCATION: left proximal forearm  FINAL EXCISION SIZE(DEFECT SIZE): 1.5 X 1.5 cm  MARGIN: 0.5 cm  FINAL REPAIR LENGTH: 4.5 cm   ANESTHESIA: 6ml  1% lidocaine with 1:100,000 epinephrine    INDICATIONS: This patient presented with a 0.6 X 0.5 cm atypical junctional melanocytic hyperplasia. Excision was indicated. We discussed the principles of treatment and most likely complications including scarring, bleeding, infection, incomplete excision, wound dehiscence, pain, nerve damage, and recurrence. Informed consent was obtained and the patient underwent the procedure as follows:    PROCEDURE: The patient was taken to the operative suite. Time-out was performed.  The treatment area was anesthetized with 1% lidocaine with epinephrine. The area was prepped with Chlorhexidine and rinsed with sterile saline and draped with sterile towels. The lesion was delineated and excised down to subcutaneous fat in a elliptical manner. Hemostasis was obtained by electrocoagulation.     REPAIR: An intermediate layered linear closure was selected as the procedure which would maximally preserve both function and cosmesis.    After the excision of the tumor, the area was carefully undermined. Hemostasis was obtained with bipolar electrocoagulation.  Closure was oriented so that the wound was in the patient's natural skin tension lines. The subcutaneous and dermal layers were  then closed with 4-0 Monocryl sutures. The epidermis was then carefully approximated along the length of the wound using 4-0 MONOCRYL running subcuticular running sutures.     Estimated blood loss was less than 10 ml for all surgical sites. A sterile pressure dressing was applied and wound care instructions, with a written handout, were given. The patient was discharged from the [facility] alert and ambulatory.    The patient elected for pathology results to automatically release and understands that the clinical staff will contact them as soon as possible to notify them of the results.    Dr. Chriss Bassett was immediately available for the entire surgery and was physicially present for the key portions of the procedure.    Anatomic Pathology Results: pending    Clinical Follow-Up: PRN. Regular skin checks with general dermatology.     Dr Patel performed the MOHS surgery and repair.     Staff Involved:  Scribe/Staff/Fellow    IODALYS, am serving as a scribe; to document services personally performed by Chriss Bassett MD -based on data collection and the provider's statements to me.      Attending attestation:  I was present for key elements of the procedure and immediately available for all other portions of the procedure.  I have reviewed the note and edited it as necessary.    Chriss Bassett M.D.  Professor  Director of Dermatologic Surgery  Department of Dermatology  [facility]    Dermatology Surgery Clinic  [facility] [address] [address]

## 2024-08-21 NOTE — NURSING NOTE
Chief Complaint   Patient presents with    Procedure     Excision left proximal arm     Annie T CMA

## 2024-08-21 NOTE — LETTER
8/21/2024       RE: Gerardo Mandujano  [address]  [facility] [address]     Dear Colleague,    Thank you for referring your patient, Gerardo Mandujano, to the [facility] DERMATOLOGIC SURGERY CLINIC [place] at . Please see a copy of my visit note below.    DERMATOLOGY EXCISION PROCEDURE NOTE    Dermatology Problem List:  1. Family history melanoma (father).  2. Acrochordon, right posterior thigh, s/p biopsy 7/16/21  3. Atypical junctional melanocytic hyperplasia, left proximal forearm, s/p bx 6/7/24, s/p excision 8/21/24    # Lesion to monitor-R inner thigh 4 x 4.5 mm      NAME OF PROCEDURE: Excision intermediate layered linear closure  Staff surgeon: Chriss Bassett MD  Fellow: Yunior Patel MD  Scrub Nurse: Lg    PRE-OPERATIVE DIAGNOSIS:   Atypical junctional melanocytic hyperplasia   POST-OPERATIVE DIAGNOSIS: Same   LOCATION: left proximal forearm  FINAL EXCISION SIZE(DEFECT SIZE): 1.5 X 1.5 cm  MARGIN: 0.5 cm  FINAL REPAIR LENGTH: 4.5 cm   ANESTHESIA: 6ml  1% lidocaine with 1:100,000 epinephrine    INDICATIONS: This patient presented with a 0.6 X 0.5 cm atypical junctional melanocytic hyperplasia. Excision was indicated. We discussed the principles of treatment and most likely complications including scarring, bleeding, infection, incomplete excision, wound dehiscence, pain, nerve damage, and recurrence. Informed consent was obtained and the patient underwent the procedure as follows:    PROCEDURE: The patient was taken to the operative suite. Time-out was performed.  The treatment area was anesthetized with 1% lidocaine with epinephrine. The area was prepped with Chlorhexidine and rinsed with sterile saline and draped with sterile towels. The lesion was delineated and excised down to subcutaneous fat in a elliptical manner. Hemostasis was obtained by electrocoagulation.     REPAIR: An intermediate layered linear closure was selected as the  procedure which would maximally preserve both function and cosmesis.    After the excision of the tumor, the area was carefully undermined. Hemostasis was obtained with bipolar electrocoagulation.  Closure was oriented so that the wound was in the patient's natural skin tension lines. The subcutaneous and dermal layers were then closed with 4-0 Monocryl sutures. The epidermis was then carefully approximated along the length of the wound using 4-0 MONOCRYL running subcuticular running sutures.     Estimated blood loss was less than 10 ml for all surgical sites. A sterile pressure dressing was applied and wound care instructions, with a written handout, were given. The patient was discharged from the [facility] alert and ambulatory.    The patient elected for pathology results to automatically release and understands that the clinical staff will contact them as soon as possible to notify them of the results.    Dr. Chriss Bassett was immediately available for the entire surgery and was physicially present for the key portions of the procedure.    Anatomic Pathology Results: pending    Clinical Follow-Up: PRN. Regular skin checks with general dermatology.     Dr Patel performed the MOHS surgery and repair.     Staff Involved:  Scribe/Staff/Fellow    I, ODALYS WAN, am serving as a scribe; to document services personally performed by Chriss Bassett MD -based on data collection and the provider's statements to me.      Attending attestation:  I was present for key elements of the procedure and immediately available for all other portions of the procedure.  I have reviewed the note and edited it as necessary.    Chriss Bassett M.D.  Professor  Director of Dermatologic Surgery  Department of Dermatology  [facility]    Dermatology Surgery Clinic  [facility] [place] [phone]        Again, thank you for allowing me to  participate in the care of your patient.      Sincerely,    Chriss Bassett MD

## 2024-08-21 NOTE — PATIENT INSTRUCTIONS
Wound care    I will experience scar, bleeding, swelling, pain, crusting and redness. I may experience incomplete removal or recurrence. Risks are bleeding, bruising, swelling, infection, nerve damage, & a large wound. A second procedure may be recommended to obtain the best cosmetic or functional result.       A three month office visit with your Surgeon is recommended for scar evaluation. Please reach out sooner if you have concerns about you surgical site/wound.    Caring for your skin after surgery    After your surgery, a pressure bandage will be placed over the area that has stitches. This is important to prevent bleeding. Please follow these instructions over the next 1 to 2 weeks. Following this regimen will help to prevent complications as your wound heals.     For the first 48 hours after your surgery:    Leave the pressure dressing on and keep it dry. If it should come loose, you may re-tape it, but do not take it off.  Relax and take it easy. Do not do any vigorous exercise or heavy lifting. This could cause the wound to bleed.  Post-Operative pain is usually mild. If you are able to take tylenol, You may take plain or extra-strength Tylenol (acetaminophen) As directed on the bottle (do not take more than 4,000mg in one day). If you are able to take ibuprofen, you can alternate the tylenol and ibuprofen.   Avoid alcohol as this may increase your tendency to bleed.   You may put an ice pack around the bandaged area for 20 minutes at a time as needed. This may help reduce swelling, bruising, and pain. Make sure the ice pack is waterproof so that the pressure bandage doesn t get wet.  If the wound is on the face try to sleep with your head elevated. Either in a recliner or propped up in bed, this will decrease swelling around the eyes.   You may see a small amount of drainage or blood on your pressure bandage. This is normal. However:  If drainage or bleeding continues or saturates the bandage, you will  need to apply firm pressure over the bandage with a piece of gauze for 15 minutes.  If bleeding continues after applying pressure for 15 minutes, apply an ice pack to the bandaged area for 15 minutes.  If bleeding still continues, call our office or go to the nearest emergency room.    Remove pressure dressing 48 hours after surgery:    Carefully remove the pressure bandage. If it seems sticky or too difficult to get off, you may need to soak it off in the shower.  After the pressure dressing is removed, you may shower and get the wound wet. However, Do Not let the forceful stream of the shower hit the wound directly.  Follow these wound care and dressing change instructions:    You have skin glue over the stitches. This will dry and flake off with time (about 2 weeks). If the stitches are still hanging around after 2 weeks, let us know, we can clip them out for you if they do not fall out with washing.   You may allow water to run over the site. Do not soak.  Do Not rub or scrub the site.  Pat dry after the shower or bath.  Avoid topical medications, lotions, creams, ointment,or oils.  Do not use tanning lamps or expose the site to sun.   Check wound appearance daily, some swelling and redness is normal after a procedure but should go away as your would is healing. If the swelling and redness or pain increases or if any other signs of infection occur listed below please send in a photo via my chart and or call us to let us know.  The clear glue film should start peeling and flaking off approximately around 2 weeks. By this time your wound should be sufficiently healed. If it still looks to be healing when the glue comes off you can clean the wound with soapy warm water daily in the shower. No need to bandage further, but you can put a bandage on the area daily for the two weeks if you would like.   At two weeks if the glue is still intact you can start using Vaseline to help soften it up and peel off.   Once the  "stitches are all out you can start using Scar Away silicone gel or sheets.        If you are able to take acetaminophen (\"Tylenol,\" etc.) and ibuprofen (\"Advil\" or \"Motrin,\" etc.), then you may STAGGER these medications by taking 400 mg of ibuprofen (usually two tabs) every 8 hours and 1,000 mg of acetaminophen (e.g., two tabs of extra-strength Tylenol) every 8 hours.    This means, for example, that you could take the followin,000 mg of Tylenol, followed 4 hours later by 400 mg Ibuprofen, followed 4 hours later with 1,000 mg of Tylenol, followed 4 hours later by 400 mg Ibuprofen, followed 4 hours later with 1,000 mg of Tylenol, and so forth.     Essentially, you can take either 1,000 mg of Tylenol or 400 mg of ibuprofen in alternating fashion EVERY FOUR HOURS.    Do NOT exceed more than 4,000 mg of Tylenol or 3,200 mg of ibuprofen per 24 hours. If you are not able to take Tylenol or ibuprofen as above due to other health issues (or a physician has told you directly that you are not allowed to take one of them, say due to pre-existing severe liver or kidney issues), then disregard the above directions.    Scientific evidence supports that this combination/schedule of pain medications is just as effective, if not more effective, than taking a narcotic pain medicine.       Follow up will be a 3 month scar evaluation either in person or via a telephone visit with you sending in a photo via [employer]. Unless you have been told to follow up sooner or if you have concerns and would like to be see sooner. Please call or send us in a [employer] message if possible and attach a photo.        What to expect:    The first couple of days your wound may be tender and may bleed slightly when doing wound care.  There may be swelling and bruising around the wound, especially if it is near the eyes. For your comfort, you may apply ice or cold compresses to the bruises after your have removed the pressure bandage.  The area around " your wound may be numb for several weeks or even months.  You may experience periodic sharp pain or mild itching around the wound as it heals.   The suture line will look dark for a while but will lighten over time.       When to call us:    You have bleeding that will not stop after applying pressure and ice.  You have pain that is not controlled with Tylenol (acetaminophen.)  You have signs or symptoms of an infection such as:  Fever over 100 degrees Fahrenheit  Redness, warmth or foul-smelling drainage from the wound  If you have any questions, or are not sure how to take care of the wound.    Phone numbers:    During business hours (M-F 8:00-4:30 p.m.)  [facility]-  [phone] Option 1 appt. Desk and ask for the Dermatology Surgery Team  [phone] [facility] .     ---------------------------------------------------------  Evenings/Weekends/Holidays  Hospital - [phone]   TTY for hearing alcgaqrc-[phone]  *Ask  to page dermatologist on-call  Emergency Elsy-[phone]  TTY for hearing impaired- [phone]

## 2024-08-22 ENCOUNTER — TELEPHONE (OUTPATIENT)
Dept: DERMATOLOGY | Facility: CLINIC | Age: 54
End: 2024-08-22
Payer: COMMERCIAL

## 2024-08-22 NOTE — TELEPHONE ENCOUNTER
Follow up call attempted & left voicemail following excision procedure with Dr. Bassett.       Are you having pain?   Are you taking pain medication?   Are you applying ice?    Have you had any noticeable bleeding through the bandage?    Do you have any other concerns?        Please call [phone] if you have any questions or concerns.

## 2024-10-17 ENCOUNTER — IMMUNIZATION (OUTPATIENT)
Dept: FAMILY MEDICINE | Facility: CLINIC | Age: 54
End: 2024-10-17
Payer: COMMERCIAL

## 2024-10-17 DIAGNOSIS — Z23 ENCOUNTER FOR IMMUNIZATION: Primary | ICD-10-CM

## 2024-10-17 PROCEDURE — 90673 RIV3 VACCINE NO PRESERV IM: CPT

## 2024-10-17 PROCEDURE — 90471 IMMUNIZATION ADMIN: CPT

## 2024-10-17 PROCEDURE — 99207 PR NO CHARGE NURSE ONLY: CPT

## 2024-10-17 PROCEDURE — 90472 IMMUNIZATION ADMIN EACH ADD: CPT

## 2024-10-17 PROCEDURE — 90746 HEPB VACCINE 3 DOSE ADULT IM: CPT

## 2024-10-17 NOTE — PROGRESS NOTES
Prior to immunization administration, verified patients identity using patient s name and date of birth. Please see Immunization Activity for additional information.     Is the patient's temperature normal (100.5 or less)? Yes     Patient MEETS CRITERIA. PROCEED with vaccine administration.          10/17/2024   Hepatitis B   Have you had a serious reaction to a hepatitis B vaccine or to something in a hepatitis B vaccine, including yeast)? No   Are you getting kidney dialysis (either peritoneal or hemodialysis)? No   Do you have an allergy to latex? No            Patient MEETS CRITERIA. PROCEED with vaccine administration.            10/17/2024   INFLUENZA   Would you like to receive the flu shot or the nasal flu vaccine today? Flu Shot   Have you had a serious reaction to a flu vaccine or something in a flu vaccine? No   Have you had Guillain-[place]ureka syndrome within 6 weeks of getting a vaccine? No   Have you received a bone marrow transplant within the previous 6 months? No            Patient MEETS CRITERIA. PROCEED with vaccine administration.        Patient instructed to remain in clinic for 15 minutes afterwards, and to report any adverse reactions.      Link to Ancillary Visit Immunization Standing Orders SmartSet     Screening performed by Jerilyn Rodriges MA on 10/17/2024 at 9:50 AM.

## 2024-10-30 ENCOUNTER — LAB (OUTPATIENT)
Dept: LAB | Facility: CLINIC | Age: 54
End: 2024-10-30
Payer: COMMERCIAL

## 2024-10-30 DIAGNOSIS — Z83.438 FAMILY HISTORY OF OTHER DISORDER OF LIPOPROTEIN METABOLISM AND OTHER LIPIDEMIA: ICD-10-CM

## 2024-10-30 LAB — APO A-I SERPL-MCNC: 74 MG/DL

## 2024-10-30 PROCEDURE — 83695 ASSAY OF LIPOPROTEIN(A): CPT

## 2024-10-30 PROCEDURE — 36415 COLL VENOUS BLD VENIPUNCTURE: CPT

## 2024-10-31 ENCOUNTER — MYC MEDICAL ADVICE (OUTPATIENT)
Dept: PODIATRY | Facility: CLINIC | Age: 54
End: 2024-10-31
Payer: COMMERCIAL

## 2024-11-13 ENCOUNTER — MYC MEDICAL ADVICE (OUTPATIENT)
Dept: FAMILY MEDICINE | Facility: CLINIC | Age: 54
End: 2024-11-13
Payer: COMMERCIAL

## 2024-11-13 DIAGNOSIS — Z83.438 FAMILY HISTORY OF OTHER DISORDER OF LIPOPROTEIN METABOLISM AND OTHER LIPIDEMIA: Primary | ICD-10-CM

## 2024-11-13 NOTE — RESULT ENCOUNTER NOTE
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 My recommendation in this instance is to simply start a statin. If you wanted to have a more nuanced take on this or see if there are other medications recommended, I could refer you to preventative cardiology. They will sometime run additional tests, but I think the end result would potentially be the same. I am happy to go either way. I suspect you prefer more information.    If you have any questions, please feel free to contact the clinic.    MELANIE Linton

## 2024-11-13 NOTE — TELEPHONE ENCOUNTER
Pended cards referral per pt valdemar if you approve,    Thanks!  Shola TOWNSEND RN   [facility]

## 2024-12-09 ENCOUNTER — OFFICE VISIT (OUTPATIENT)
Dept: DERMATOLOGY | Facility: CLINIC | Age: 54
End: 2024-12-09
Payer: COMMERCIAL

## 2024-12-09 ENCOUNTER — ANCILLARY PROCEDURE (OUTPATIENT)
Dept: MAMMOGRAPHY | Facility: CLINIC | Age: 54
End: 2024-12-09
Attending: NURSE PRACTITIONER
Payer: COMMERCIAL

## 2024-12-09 DIAGNOSIS — Z12.31 VISIT FOR SCREENING MAMMOGRAM: ICD-10-CM

## 2024-12-09 DIAGNOSIS — Z51.89 VISIT FOR WOUND CHECK: Primary | ICD-10-CM

## 2024-12-09 PROCEDURE — 77063 BREAST TOMOSYNTHESIS BI: CPT | Mod: GC | Performed by: RADIOLOGY

## 2024-12-09 PROCEDURE — 77067 SCR MAMMO BI INCL CAD: CPT | Mod: GC | Performed by: RADIOLOGY

## 2024-12-09 NOTE — NURSING NOTE
Chief Complaint   Patient presents with    Scar Management     Left forearm     Annie JIMÉNEZ CMA

## 2024-12-09 NOTE — LETTER
12/9/2024       RE: Gerardo Mandujano  [address]  [facility] [address]     Dear Colleague,    Thank you for referring your patient, Gerardo Mandujano, to the [facility] DERMATOLOGIC SURGERY CLINIC [place] at [facility]. Please see a copy of my visit note below.    Dermatologic Surgery Post-Op Wound Check     CC: Scar Management (Left forearm)      Dermatology Problem List:  1. Family history melanoma (father).  2. Acrochordon, right posterior thigh, s/p biopsy 7/16/21  3. Atypical junctional melanocytic hyperplasia, left proximal forearm, s/p bx 6/7/24, s/p excision 8/21/24     # Lesion to monitor-R inner thigh 4 x 4.5 mm    Subjective: Gerardo Mandujano is a 54 year old female who presents today for wound check after 08/21/24 excision with intermediate layered linear closure of atypical junctional melanocytic hyperplasia on the left proximal forearm.   - Reports scar is healing well overall. Has mild redness. She is using silicone scar sheets.   - No other concerns today    Objective: An exam of the left proximal forearm was performed today   - The surgical site noted above is clean, dry, and intact. There is no surrounding erythema, purulence, or significant tenderness to palpation. No clinical evidence of infection noted today.      Assessment and Plan:     1. S/p 08/21/24 excision with intermediate layered linear closure of atypical junctional melanocytic hyperplasia on the left proximal forearm  - The patient's surgery site(s) is/are healing very well. No evidence of infection on examination today.  - The patient was told to continue with silicone scar sheets for the next month to continue helping with redness  - The patient should follow up with dermatologic surgery PRN, as well as continue with regular skin exams in general dermatology clinic.    Patient was discussed with and evaluated by attending physician Dr. Bassett.    Staff  Involved:  Staff/Scribe/Resident    Scribe Disclosure:   I, Antionette Yandy, am serving as a scribe; to document services personally performed by Chriss Bassett MD, based on data collection and the provider's statements to me.     Provider Disclosure:  I agree with the above history, review of systems, physical exam and plan.  I have reviewed the content of the documentation and have edited it as needed. I have personally performed the services documented here and the documentation accurately represents those services and the decisions I have made.      Staff and Resident:  I, Carlos Kathleen MD, discussed and evaluated the patient with Dr. Bassett.    Electronically signed by:  Attending Attestation  I attest that the Fellow recorded the interview and exam that I personally performed.  I have reviewed the note and edited it as necessary.    Chriss Bassett M.D.  Professor  Director of Dermatologic Surgery  Department of Dermatology  [facility]     Again, thank you for allowing me to participate in the care of your patient.      Sincerely,    Chriss Bassett MD

## 2024-12-09 NOTE — PROGRESS NOTES
Dermatologic Surgery Post-Op Wound Check     CC: Scar Management (Left forearm)      Dermatology Problem List:  1. Family history melanoma (father).  2. Acrochordon, right posterior thigh, s/p biopsy 7/16/21  3. Atypical junctional melanocytic hyperplasia, left proximal forearm, s/p bx 6/7/24, s/p excision 8/21/24     # Lesion to monitor-R inner thigh 4 x 4.5 mm    Subjective: Gerardo Mandujano is a 54 year old female who presents today for wound check after 08/21/24 excision with intermediate layered linear closure of atypical junctional melanocytic hyperplasia on the left proximal forearm.   - Reports scar is healing well overall. Has mild redness. She is using silicone scar sheets.   - No other concerns today    Objective: An exam of the left proximal forearm was performed today   - The surgical site noted above is clean, dry, and intact. There is no surrounding erythema, purulence, or significant tenderness to palpation. No clinical evidence of infection noted today.      Assessment and Plan:     1. S/p 08/21/24 excision with intermediate layered linear closure of atypical junctional melanocytic hyperplasia on the left proximal forearm  - The patient's surgery site(s) is/are healing very well. No evidence of infection on examination today.  - The patient was told to continue with silicone scar sheets for the next month to continue helping with redness  - The patient should follow up with dermatologic surgery PRN, as well as continue with regular skin exams in general dermatology clinic.    Patient was discussed with and evaluated by attending physician Dr. Bassett.    Staff Involved:  Staff/Scribe/Resident    Scribe Disclosure:   I, Antionette [place], am serving as a scribe; to document services personally performed by Chriss Bassett MD, based on data collection and the provider's statements to me.     Provider Disclosure:  I agree with the above history, review of systems, physical exam and plan.  I have reviewed the  content of the documentation and have edited it as needed. I have personally performed the services documented here and the documentation accurately represents those services and the decisions I have made.      Staff and Resident:  I, Carlos Kathleen MD, discussed and evaluated the patient with Dr. Bassett.    Electronically signed by:  Attending Attestation  I attest that the Fellow recorded the interview and exam that I personally performed.  I have reviewed the note and edited it as necessary.    Chriss Bassett M.D.  Professor  Director of Dermatologic Surgery  Department of Dermatology  [facility]

## 2024-12-14 NOTE — PROGRESS NOTES
[facility] Dermatology Note  Encounter Date: Dec 17, 2024  Office Visit     Dermatology Problem List:    # Family history melanoma (father).  # Acrochordon, right posterior thigh, s/p biopsy 7/16/21  # atypical junctional melanocytic hyperplasia, L proximal forearm, s/p shave bx 6/7/24. S/p excision 08/21/2024    # Lesion to monitor- R inner thigh 4 x 4.5 mm    ____________________________________________    Assessment & Plan:     # Lesion to monitor- R inner thigh 4 x 4.5 mm  - Measuring 4 x 5 mm today. Suspect margin of error. Overall, unchanged from prior.    # Benign lesions - SKs, cherry angiomas, lentigenes.  - No treatment required    # Multiple benign nevi.   # Fhx melanoma.  - Monitor for ABCDEs of melanoma   - Continue sun protection - recommend SPF 30 or higher with frequent application   - Return sooner if noticing changing or symptomatic lesions    Procedures Performed:   N/A    Follow-up: 6 months in-person, or earlier for new or changing lesions    Staff and Scribe:  Scribe Disclosure:   I, Margoth Morin, am serving as a scribe; to document services personally performed by Luz Figueroa MD -based on data collection and the provider's statements to me.     Provider Disclosure:   The documentation recorded by the scribe accurately reflects the services I personally performed and the decisions made by me.    Luz Figueroa MD    Department of Dermatology  [facility]: Phone: [phone], Fax: [phone]  12/23/2024         ____________________________________________    CC: Skin Check (FBSC/Concerns: Hx of Atypical junctional melanocytic hyperplasia )    HPI:  Ms. Gerardo Mandujano is a(n) 54 year old female who presents today as a return patient for FBSC.    The patient reports that her recent excision went well.   She notes of some dryness on her hands, but this is typical for her  during winter. She uses cerave to moisturize her hands.   She does not note any changes to her LTM.     Patient is otherwise feeling well, without additional skin concerns.    Labs Reviewed:  Dermatopathology from 06/07/2024  Final Diagnosis   Left proximal forearm:  - Atypical junctional melanocytic hyperplasia - (see comment)       Physical exam:  Vitals: LMP 11/05/2012   GEN: This is a well developed, well-nourished female in no acute distress, in a pleasant mood.    SKIN: Total skin excluding the undergarment areas was performed. The exam included the head/face, neck, both arms, chest, back, abdomen, both legs, digits and/or nails.   - There are dome shaped bright red papules on the trunk and extremities .   - Multiple regular brown pigmented macules and papules are identified on the trunk and extremities. .   - Scattered brown macules on sun exposed areas.  - Waxy stuck on papules and plaques on trunk and extremities.   - R inner thigh 4 x 4.5 mm even light brown macule superiorly with slight pedicle, dermoscopy very evenly reticulated. Unchanged today.   - No other lesions of concern on areas examined.         Medications:  Current Outpatient Medications   Medication Sig Dispense Refill    Calcium-Magnesium-Zinc 333-133-5 MG TABS per tablet Take 1 tablet by mouth daily      diclofenac (VOLTAREN) 1 % topical gel Apply 2 g topically 4 times daily 350 g 1    ibuprofen 200 MG capsule Take 200 mg by mouth as needed      lisinopril (ZESTRIL) 10 MG tablet Take 1 tablet (10 mg) by mouth daily 90 tablet 3    VITAMIN D, CHOLECALCIFEROL, PO Take  by mouth daily.       No current facility-administered medications for this visit.      Past Medical History:   Patient Active Problem List   Diagnosis    CARDIOVASCULAR SCREENING; LDL GOAL LESS THAN 160    Headache    Spasm of muscle    Arthralgia of temporomandibular joint    Benign essential hypertension    Left leg weakness    Weakness of left foot    Postural urinary  incontinence    Pelvic floor dysfunction    Acute low back pain, unspecified back pain laterality, unspecified whether sciatica present     Past Medical History:   Diagnosis Date    Allergies     Seasonal    Tension headache         CC Referred Self, MD  No address on file on close of this encounter.

## 2024-12-17 ENCOUNTER — OFFICE VISIT (OUTPATIENT)
Dept: DERMATOLOGY | Facility: CLINIC | Age: 54
End: 2024-12-17
Attending: DERMATOLOGY
Payer: COMMERCIAL

## 2024-12-17 DIAGNOSIS — D22.9 MULTIPLE BENIGN NEVI: ICD-10-CM

## 2024-12-17 DIAGNOSIS — L81.4 SOLAR LENTIGO: ICD-10-CM

## 2024-12-17 DIAGNOSIS — D18.01 CHERRY ANGIOMA: ICD-10-CM

## 2024-12-17 DIAGNOSIS — Z80.8 FAMILY HISTORY OF MELANOMA: Primary | ICD-10-CM

## 2024-12-17 DIAGNOSIS — Z12.83 SKIN CANCER SCREENING: ICD-10-CM

## 2024-12-17 DIAGNOSIS — L82.1 SEBORRHEIC KERATOSES: ICD-10-CM

## 2024-12-17 NOTE — PATIENT INSTRUCTIONS
Checking for Skin Cancer  You can help find cancer early by checking your skin each month. There are 3 main kinds of skin cancer: melanoma, basal cell carcinoma, and squamous cell carcinoma. Doing monthly skin checks is the best way to find new marks, sores, or skin changes. Follow these instructions for checking your skin.   The ABCDEs of checking moles for melanoma   Check your moles or growths for signs of melanoma using ABCDE:   Asymmetry: The sides of the mole or growth don t match.  Border: The edges are ragged, notched, or blurred.  Color: The color within the mole or growth varies. It could be black, brown, tan, white, or shades of red, gray, or blue.  Diameter: The mole or growth is larger than   inch or 6 mm (size of a pencil eraser).  Evolving: The size, shape, texture, or color of the mole or growth is changing.     ABCDE's of moles on light skin.        ABCDE's of moles on dark skin may be harder to identify.     Checking for other types of skin cancer  Basal cell carcinoma or squamous cell carcinoma cause symptoms like:     A spot or mole that looks different from all other marks on your skin  Changes in how an area feels, such as itching, tenderness, or pain  Changes in the skin's surface, such as oozing, bleeding, or scaliness  A sore that doesn't heal  New swelling, redness, or spread of color beyond the border of a mole    Who s at risk?  Anyone of any skin color can get skin cancer. But you're at greater risk if you have:   Fair skin that freckles easily and burns instead of tanning  Light-colored or red hair  Light-colored eyes  Many moles or abnormal moles on your skin  A long history of unprotected exposure to sunlight or tanning beds  A history of many blistering sunburns as a child or teen  A family history of skin cancer  Been exposed to radiation or chemicals  A weakened immune system  Been exposed to arsenic  If you've had skin cancer in the past, you're at high risk of having it  again.   How to check your skin  Do your monthly skin checkups in front of a full-length mirror. Use a room with good lighting so it's easier to see. Use a hand mirror to look at hard-to-see places like your buttocks and back. You can also have a trusted friend or family member help you with these checks. Check every part of your body, including your:   Head (ears, face, neck, and scalp)  Torso (front, back, sides, and under breasts)  Arms (tops, undersides, and armpits)  Hands (palms, backs, and fingers, including under the nails)  Lower back, buttocks, and genitals  Legs (front, back, and sides)  Feet (tops, soles, toes, including under the nails, and between toes)  Watch for new spots on your skin or a spot that's changing in color, shape, size.   If you have a lot of moles, take digital photos of them each month. Make sure to take photos both up close and from a distance. These can help you see if any moles change over time.   Know your skin  Most skin changes aren't cancer. But if you see any changes in your skin, call your healthcare provider right away. Only they can tell you if a change is a problem. If you have skin cancer, seeing your provider can be the first step to getting the treatment that could save your life.   [employer] last reviewed this educational content on 10/1/2021    2735-4739 [employer]. All rights reserved. This information is not intended as a substitute for professional medical care. Always follow your healthcare professional's instructions.         Proper skin care from [facility]:    -Eliminate harsh soaps as they strip the natural oils from the skin, often resulting in dry itchy skin ( i.e. Dial, Zest, [place] Spring)  -Use mild soaps such as Cetaphil or Dove Sensitive Skin in the shower. You do not need to use soap on arms, legs, and trunk every time you shower unless visibly soiled.   -Avoid hot or cold showers.  -After showering, lightly dry off and apply  moisturizing within 2-3 minutes. This will help trap moisture in the skin.   -Aggressive use of a moisturizer at least 1-2 times a day to the entire body (including -Vanicream, Cetaphil, Aquaphor or Cerave) and moisturize hands after every washing.  -We recommend using moisturizers that come in a tub that needs to be scooped out, not a pump. This has more of an oil base. It will hold moisture in your skin much better than a water base moisturizer. The above recommended are non-pore clogging.      Wear a sunscreen with at least SPF 30 on your face, ears, neck and V of the chest daily. Wear sunscreen on other areas of the body if those areas are exposed to the sun throughout the day. Sunscreens can contain physical and/or chemical blockers. Physical blockers are less likely to clog pores, these include zinc oxide and titanium dioxide. Reapply every two hour and after swimming.     Sunscreen examples: https://www.ewg.org/sunscreen/    UV radiation  UVA radiation remains constant throughout the day and throughout the year. It is a longer wavelength than UVB and therefore penetrates deeper into the skin leading to immediate and delayed tanning, photoaging, and skin cancer. 70-80% of UVA and UVB radiation occurs between the hours of 10am-2pm.  UVB radiation  UVB radiation causes the most harmful effects and is more significant during the summer months. However, snow and ice can reflect UVB radiation leading to skin damage during the winter months as well. UVB radiation is responsible for tanning, burning, inflammation, delayed erythema (pinkness), pigmentation (brown spots), and skin cancer.     I recommend self monthly full body exams and yearly full body exams with a dermatology provider. If you develop a new or changing lesion please follow up for examination. Most skin cancers are pink and scaly or pink and pearly. However, we do see blue/brown/black skin cancers.  Consider the ABCDEs of melanoma when giving yourself  your monthly full body exam ( don't forget the groin, buttocks, feet, toes, etc). A-asymmetry, B-borders, C-color, D-diameter, E-elevation or evolving. If you see any of these changes please follow up in clinic. If you cannot see your back I recommend purchasing a hand held mirror to use with a larger wall mirror.       Checking for Skin Cancer  You can find cancer early by checking your skin each month. There are 3 kinds of skin cancer. They are melanoma, basal cell carcinoma, and squamous cell carcinoma. Doing monthly skin checks is the best way to find new marks or skin changes. Follow the instructions below for checking your skin.   The ABCDEs of checking moles for melanoma   Check your moles or growths for signs of melanoma using ABCDE:   Asymmetry: the sides of the mole or growth don t match  Border: the edges are ragged, notched, or blurred  Color: the color within the mole or growth varies  Diameter: the mole or growth is larger than 6 mm (size of a pencil eraser)  Evolving: the size, shape, or color of the mole or growth is changing (evolving is not shown in the images below)    Checking for other types of skin cancer  Basal cell carcinoma or squamous cell carcinoma have symptoms such as:     A spot or mole that looks different from all other marks on your skin  Changes in how an area feels, such as itching, tenderness, or pain  Changes in the skin's surface, such as oozing, bleeding, or scaliness  A sore that does not heal  New swelling or redness beyond the border of a mole    Who s at risk?  Anyone can get skin cancer. But you are at greater risk if you have:   Fair skin, light-colored hair, or light-colored eyes  Many moles or abnormal moles on your skin  A history of sunburns from sunlight or tanning beds  A family history of skin cancer  A history of exposure to radiation or chemicals  A weakened immune system  If you have had skin cancer in the past, you are at risk for recurring skin cancer.   How  to check your skin  Do your monthly skin checkups in front of a full-length mirror. Check all parts of your body, including your:   Head (ears, face, neck, and scalp)  Torso (front, back, and sides)  Arms (tops, undersides, upper, and lower armpits)  Hands (palms, backs, and fingers, including under the nails)  Buttocks and genitals  Legs (front, back, and sides)  Feet (tops, soles, toes, including under the nails, and between toes)  If you have a lot of moles, take digital photos of them each month. Make sure to take photos both up close and from a distance. These can help you see if any moles change over time.   Most skin changes are not cancer. But if you see any changes in your skin, call your doctor right away. Only he or she can diagnose a problem. If you have skin cancer, seeing your doctor can be the first step toward getting the treatment that could save your life.   [employer] last reviewed this educational content on 4/1/2019 2000-2020 [employer]. [address] [place] [address]. All rights reserved. This information is not intended as a substitute for professional medical care. Always follow your healthcare professional's instructions.       When should I call my doctor?  If you are worsening or not improving, please, contact us or seek urgent care as noted below.     Who should I call with questions (adults)?    [facility] [phone]  For urgent needs outside of business hours call the [facility] at [phone] and ask for the dermatology resident on call to be paged  If this is a medical emergency and you are unable to reach an ER, Call [phone]      If you need a prescription refill, please contact your pharmacy. Refills are approved or denied by our Physicians during normal business hours, Monday through Fridays  Per office policy, refills will not be granted if you have not been seen within the past year (or sooner depending on your  child's condition)

## 2024-12-17 NOTE — LETTER
12/17/2024      Gerardo Mandujano  [address]  [facility] [address]      Dear Colleague,    Thank you for referring your patient, Gerardo Mandujano, to the [facility]. Please see a copy of my visit note below.    [facility] Dermatology Note  Encounter Date: Dec 17, 2024  Office Visit     Dermatology Problem List:    # Family history melanoma (father).  # Acrochordon, right posterior thigh, s/p biopsy 7/16/21  # atypical junctional melanocytic hyperplasia, L proximal forearm, s/p shave bx 6/7/24. S/p excision 08/21/2024    # Lesion to monitor- R inner thigh 4 x 4.5 mm    ____________________________________________    Assessment & Plan:     # Lesion to monitor- R inner thigh 4 x 4.5 mm  - Measuring 4 x 5 mm today. Suspect margin of error. Overall, unchanged from prior.    # Benign lesions - SKs, cherry angiomas, lentigenes.  - No treatment required    # Multiple benign nevi.   # Fhx melanoma.  - Monitor for ABCDEs of melanoma   - Continue sun protection - recommend SPF 30 or higher with frequent application   - Return sooner if noticing changing or symptomatic lesions    Procedures Performed:   N/A    Follow-up: 6 months in-person, or earlier for new or changing lesions    Staff and Scribe:  Scribe Disclosure:   I, Margoth Morin, am serving as a scribe; to document services personally performed by Luz Figueroa MD -based on data collection and the provider's statements to me.     Provider Disclosure:   The documentation recorded by the scribe accurately reflects the services I personally performed and the decisions made by me.    Luz Figueroa MD    Department of Dermatology  [facility]: Phone: [phone], Fax: [phone]  12/23/2024         ____________________________________________    CC: Skin Check (FBSC/Concerns: Hx of Atypical junctional melanocytic  hyperplasia )    HPI:  Ms. Gerardo Mandujano is a(n) 54 year old female who presents today as a return patient for [facility].    The patient reports that her recent excision went well.   She notes of some dryness on her hands, but this is typical for her during winter. She uses cerave to moisturize her hands.   She does not note any changes to her LTM.     Patient is otherwise feeling well, without additional skin concerns.    Labs Reviewed:  Dermatopathology from 06/07/2024  Final Diagnosis   Left proximal forearm:  - Atypical junctional melanocytic hyperplasia - (see comment)       Physical exam:  Vitals: LMP 11/05/2012   GEN: This is a well developed, well-nourished female in no acute distress, in a pleasant mood.    SKIN: Total skin excluding the undergarment areas was performed. The exam included the head/face, neck, both arms, chest, back, abdomen, both legs, digits and/or nails.   - There are dome shaped bright red papules on the trunk and extremities .   - Multiple regular brown pigmented macules and papules are identified on the trunk and extremities. .   - Scattered brown macules on sun exposed areas.  - Waxy stuck on papules and plaques on trunk and extremities.   - R inner thigh 4 x 4.5 mm even light brown macule superiorly with slight pedicle, dermoscopy very evenly reticulated. Unchanged today.   - No other lesions of concern on areas examined.         Medications:  Current Outpatient Medications   Medication Sig Dispense Refill     Calcium-Magnesium-Zinc 333-133-5 MG TABS per tablet Take 1 tablet by mouth daily       diclofenac (VOLTAREN) 1 % topical gel Apply 2 g topically 4 times daily 350 g 1     ibuprofen 200 MG capsule Take 200 mg by mouth as needed       lisinopril (ZESTRIL) 10 MG tablet Take 1 tablet (10 mg) by mouth daily 90 tablet 3     VITAMIN D, CHOLECALCIFEROL, PO Take  by mouth daily.       No current facility-administered medications for this visit.      Past Medical History:   Patient Active  Problem List   Diagnosis     CARDIOVASCULAR SCREENING; LDL GOAL LESS THAN 160     Headache     Spasm of muscle     Arthralgia of temporomandibular joint     Benign essential hypertension     Left leg weakness     Weakness of left foot     Postural urinary incontinence     Pelvic floor dysfunction     Acute low back pain, unspecified back pain laterality, unspecified whether sciatica present     Past Medical History:   Diagnosis Date     Allergies     Seasonal     Tension headache         CC Referred Self, MD  No address on file on close of this encounter.      Again, thank you for allowing me to participate in the care of your patient.        Sincerely,        Luz Figueroa MD

## 2025-01-06 ENCOUNTER — MYC MEDICAL ADVICE (OUTPATIENT)
Dept: FAMILY MEDICINE | Facility: CLINIC | Age: 55
End: 2025-01-06
Payer: COMMERCIAL

## 2025-01-14 ENCOUNTER — TELEPHONE (OUTPATIENT)
Dept: CARDIOLOGY | Facility: CLINIC | Age: 55
End: 2025-01-14
Payer: COMMERCIAL

## 2025-01-14 DIAGNOSIS — E78.5 HLD (HYPERLIPIDEMIA): Primary | ICD-10-CM

## 2025-01-14 NOTE — TELEPHONE ENCOUNTER
Patient Contacted for the patient to call back and schedule the following:    Appointment type: FASTING LABS  Provider: REINALDO  Return date: 01/16/25  Specialty phone number: [phone] OPT 1  Additional appointment(s) needed: N/A  Additonal Notes: N/A

## 2025-01-15 ENCOUNTER — LAB (OUTPATIENT)
Dept: LAB | Facility: CLINIC | Age: 55
End: 2025-01-15
Payer: COMMERCIAL

## 2025-01-15 DIAGNOSIS — E78.5 HLD (HYPERLIPIDEMIA): ICD-10-CM

## 2025-01-15 LAB
ALBUMIN SERPL BCG-MCNC: 4.2 G/DL (ref 3.5–5.2)
ALP SERPL-CCNC: 137 U/L (ref 40–150)
ALT SERPL W P-5'-P-CCNC: 27 U/L (ref 0–50)
ANION GAP SERPL CALCULATED.3IONS-SCNC: 8 MMOL/L (ref 7–15)
AST SERPL W P-5'-P-CCNC: 21 U/L (ref 0–45)
BILIRUB SERPL-MCNC: 0.3 MG/DL
BUN SERPL-MCNC: 18.5 MG/DL (ref 6–20)
CALCIUM SERPL-MCNC: 9.4 MG/DL (ref 8.8–10.4)
CHLORIDE SERPL-SCNC: 107 MMOL/L (ref 98–107)
CHOLEST SERPL-MCNC: 214 MG/DL
CREAT SERPL-MCNC: 0.65 MG/DL (ref 0.51–0.95)
EGFRCR SERPLBLD CKD-EPI 2021: >90 ML/MIN/1.73M2
FASTING STATUS PATIENT QL REPORTED: YES
FASTING STATUS PATIENT QL REPORTED: YES
GLUCOSE SERPL-MCNC: 90 MG/DL (ref 70–99)
HCO3 SERPL-SCNC: 29 MMOL/L (ref 22–29)
HDLC SERPL-MCNC: 50 MG/DL
LDLC SERPL CALC-MCNC: 146 MG/DL
LDLC SERPL DIRECT ASSAY-MCNC: 151 MG/DL
NONHDLC SERPL-MCNC: 164 MG/DL
POTASSIUM SERPL-SCNC: 4.1 MMOL/L (ref 3.4–5.3)
PROT SERPL-MCNC: 7.1 G/DL (ref 6.4–8.3)
SODIUM SERPL-SCNC: 144 MMOL/L (ref 135–145)
TRIGL SERPL-MCNC: 90 MG/DL

## 2025-01-15 PROCEDURE — 80061 LIPID PANEL: CPT | Performed by: PATHOLOGY

## 2025-01-15 PROCEDURE — 36415 COLL VENOUS BLD VENIPUNCTURE: CPT | Performed by: PATHOLOGY

## 2025-01-15 PROCEDURE — 80053 COMPREHEN METABOLIC PANEL: CPT | Performed by: PATHOLOGY

## 2025-01-15 PROCEDURE — 99000 SPECIMEN HANDLING OFFICE-LAB: CPT | Performed by: PATHOLOGY

## 2025-01-15 PROCEDURE — 83721 ASSAY OF BLOOD LIPOPROTEIN: CPT | Performed by: INTERNAL MEDICINE

## 2025-01-16 ENCOUNTER — OFFICE VISIT (OUTPATIENT)
Dept: CARDIOLOGY | Facility: CLINIC | Age: 55
End: 2025-01-16
Attending: NURSE PRACTITIONER
Payer: COMMERCIAL

## 2025-01-16 VITALS
WEIGHT: 131 LBS | OXYGEN SATURATION: 96 % | SYSTOLIC BLOOD PRESSURE: 114 MMHG | BODY MASS INDEX: 23.33 KG/M2 | HEART RATE: 116 BPM | DIASTOLIC BLOOD PRESSURE: 75 MMHG

## 2025-01-16 DIAGNOSIS — Z83.438 FAMILY HISTORY OF OTHER DISORDER OF LIPOPROTEIN METABOLISM AND OTHER LIPIDEMIA: ICD-10-CM

## 2025-01-16 PROCEDURE — 93005 ELECTROCARDIOGRAM TRACING: CPT

## 2025-01-16 PROCEDURE — 93010 ELECTROCARDIOGRAM REPORT: CPT | Performed by: INTERNAL MEDICINE

## 2025-01-16 PROCEDURE — 99203 OFFICE O/P NEW LOW 30 MIN: CPT | Performed by: INTERNAL MEDICINE

## 2025-01-16 PROCEDURE — 99213 OFFICE O/P EST LOW 20 MIN: CPT | Performed by: INTERNAL MEDICINE

## 2025-01-16 ASSESSMENT — PAIN SCALES - GENERAL: PAINLEVEL_OUTOF10: NO PAIN (0)

## 2025-01-16 NOTE — NURSING NOTE
Chief Complaint   Patient presents with    New Patient     NEW PREVENTATIVE     Vitals were taken, medications reconciled, and EKG was performed.    Shola Stovall, EMT  2:34 PM

## 2025-01-16 NOTE — CONFIDENTIAL NOTE
HPI: ***    PAST MEDICAL HISTORY:  Past Medical History:   Diagnosis Date    Allergies     Seasonal    Tension headache        CURRENT MEDICATIONS:  Current Outpatient Medications   Medication Sig Dispense Refill    Calcium-Magnesium-Zinc 333-133-5 MG TABS per tablet Take 1 tablet by mouth daily      ibuprofen 200 MG capsule Take 200 mg by mouth as needed      lisinopril (ZESTRIL) 10 MG tablet Take 1 tablet (10 mg) by mouth daily 90 tablet 3    VITAMIN D, CHOLECALCIFEROL, PO Take  by mouth daily.      diclofenac (VOLTAREN) 1 % topical gel Apply 2 g topically 4 times daily 350 g 1       PAST SURGICAL HISTORY:  Past Surgical History:   Procedure Laterality Date    C ANESTH, SECTION      LAPAROSCOPIC HYSTERECTOMY SUPRACERVICAL  2012    benign path       ALLERGIES     Allergies   Allergen Reactions    Chlorhexidine Gluconate Itching and Rash       FAMILY HISTORY:  Family History   Problem Relation Age of Onset    Gynecology Mother         fibroids    Breast Cancer Mother     Hypertension Father     Lipids Father     Skin Cancer Father     Cancer Maternal Grandmother         throat    Breast Cancer Paternal Grandmother     Cancer - colorectal Paternal Grandfather        SOCIAL HISTORY:  Social History     Socioeconomic History    Marital status:      Spouse name: None    Number of children: None    Years of education: None    Highest education level: None   Tobacco Use    Smoking status: Never    Smokeless tobacco: Never   Vaping Use    Vaping status: Never Used   Substance and Sexual Activity    Alcohol use: Yes     Comment: 0-1 drink per week    Drug use: No    Sexual activity: Yes     Partners: Female     Comment: monogamous relationship   Other Topics Concern     Service No    Blood Transfusions No    Caffeine Concern No    Occupational Exposure No    Hobby Hazards No    Sleep Concern No    Stress Concern No    Weight Concern Yes     Comment: Feels she is underweight    Special Diet  No    Back Care No    Exercise Yes     Comment: Yoga twice weekly    Bike Helmet Yes    Seat Belt Yes    Self-Exams No    Parent/sibling w/ CABG, MI or angioplasty before 65F 55M? No   Social History Narrative        Balanced Diet - Yes    Osteoporosis Prevention Measures - Dairy servings per day: 2    Regular Exercise -  No Describe     Dental Exam - YES - Date: 12/09    Eye Exam - YES - Date: 2008    Self Breast Exam - Yes    Abuse: Current or Past (Physical, Sexual or Emotional)- No    Do you feel safe in your environment - Yes    Guns stored in the home - No    Sunscreen used - Yes    Seatbelts used - Yes    Lipids -  YES - Date: 10/7/08    Glucose -  NO    Colon Cancer Screening - No    Hemoccults - UNKNOWN    Pap Test -  YES - Date: 10/7/08    Do you have any concerns about STD's -  No    Mammography - YES - Date: pt had mammo 10 yrs ago due to breast lumps    DEXA - NO    Immunizations reviewed and up to date - No, ? Last JOSE LUIS Mendiola CMA                 Social Drivers of Health     Financial Resource Strain: Low Risk  (3/10/2024)    Financial Resource Strain     Within the past 12 months, have you or your family members you live with been unable to get utilities (heat, electricity) when it was really needed?: No   Food Insecurity: Low Risk  (3/10/2024)    Food Insecurity     Within the past 12 months, did you worry that your food would run out before you got money to buy more?: No     Within the past 12 months, did the food you bought just not last and you didn t have money to get more?: No   Transportation Needs: Low Risk  (3/10/2024)    Transportation Needs     Within the past 12 months, has lack of transportation kept you from medical appointments, getting your medicines, non-medical meetings or appointments, work, or from getting things that you need?: No   Physical Activity: Sufficiently Active (3/10/2024)    Exercise Vital Sign     Days of Exercise per Week: 7 days     Minutes of Exercise per  Session: 40 min   Stress: No Stress Concern Present (3/10/2024)    [employer] - Occupational Stress Questionnaire     Feeling of Stress : Only a little   Social Connections: Unknown (3/10/2024)    Social Connection and Isolation Panel [NHANES]     Frequency of Social Gatherings with Friends and Family: Once a week   Interpersonal Safety: Low Risk  (3/15/2024)    Interpersonal Safety     Do you feel physically and emotionally safe where you currently live?: Yes     Within the past 12 months, have you been hit, slapped, kicked or otherwise physically hurt by someone?: No     Within the past 12 months, have you been humiliated or emotionally abused in other ways by your partner or ex-partner?: No   Housing Stability: Low Risk  (3/10/2024)    Housing Stability     Do you have housing? : Yes     Are you worried about losing your housing?: No       ROS:   Constitutional: No fever, chills, or sweats. No weight gain/loss   ENT: No visual disturbance, ear ache, epistaxis, sore throat  Allergies/Immunologic: Negative.   Respiratory: No cough, hemoptysia  Cardiovascular: As per HPI  GI: No nausea, vomiting, hematemesis, melena, or hematochezia  : No urinary frequency, dysuria, or hematuria  Integument: Negative  Psychiatric: Negative  Neuro: Negative  Endocrinology: Negative   Musculoskeletal: Negative    EXAM:  /75 (BP Location: Left arm, Patient Position: Chair, Cuff Size: Adult Regular)   Pulse 116   Wt 59.4 kg (131 lb)   LMP 11/05/2012   SpO2 96%   BMI 23.33 kg/m    In general, the patient is a pleasant female in no apparent distress.    HEENT: NC/AT.  PERRLA.  EOMI.  Sclerae white, not injected.  Nares clear.  Pharynx without erythema or exudate.  Dentition intact.    Neck: No adenopathy.  No thyromegaly. Carotids +4/4 bilaterally without bruits.  No jugular venous distension.   Heart: RRR. Normal S1, S2 splits physiologically. No murmur, rub, click, or gallop. The PMI is in the  5th ICS in the midclavicular line. There is no heave.    Lungs: CTA.  No ronchi, wheezes, rales.  No dullness to percussion.   Abdomen: Soft, nontender, nondistended. No organomegaly.  No bruits.   Extremities: No clubbing, cyanosis, or edema.  The pulses are +4/4 at the radial, brachial, femoral, popliteal, DP, and PT sites bilaterally.  No bruits are noted.  Neurologic: Alert and oriented to person/place/time, normal speech, gait and affect  Skin: No petechiae, purpura or rash.    Labs:  LIPID RESULTS:  Lab Results   Component Value Date    CHOL 214 (H) 01/15/2025    CHOL 202 (H) 01/15/2021    HDL 50 01/15/2025    HDL 67 01/15/2021     (H) 01/15/2025     (H) 01/15/2025     (H) 01/15/2021    TRIG 90 01/15/2025    TRIG 77 01/15/2021    CHOLHDLRATIO 2.4 10/07/2008    NHDL 164 (H) 01/15/2025    NHDL 135 (H) 01/15/2021       LIVER ENZYME RESULTS:  Lab Results   Component Value Date    AST 21 01/15/2025    AST 18 01/15/2021    ALT 27 01/15/2025    ALT 26 01/15/2021       CBC RESULTS:  Lab Results   Component Value Date    WBC 8.2 03/22/2017    RBC 4.33 03/22/2017    HGB 13.2 03/22/2017    HCT 39.5 03/22/2017    MCV 91 03/22/2017    MCH 30.5 03/22/2017    MCHC 33.4 03/22/2017    RDW 12.8 03/22/2017     03/22/2017       BMP RESULTS:  Lab Results   Component Value Date     01/15/2025     01/15/2021    POTASSIUM 4.1 01/15/2025    POTASSIUM 4.0 02/04/2022    POTASSIUM 4.0 01/15/2021    CHLORIDE 107 01/15/2025    CHLORIDE 107 02/04/2022    CHLORIDE 112 (H) 01/15/2021    CO2 29 01/15/2025    CO2 28 02/04/2022    CO2 25 01/15/2021    ANIONGAP 8 01/15/2025    ANIONGAP 4 02/04/2022    ANIONGAP 4 01/15/2021    GLC 90 01/15/2025    GLC 94 02/04/2022    GLC 82 01/15/2021    BUN 18.5 01/15/2025    BUN 13 02/04/2022    BUN 12 01/15/2021    CR 0.65 01/15/2025    CR 0.60 01/15/2021    GFRESTIMATED >90 01/15/2025    GFRESTIMATED >90 01/15/2021    GFRESTBLACK >90 01/15/2021    VERN 9.4 01/15/2025     "VERN 8.6 01/15/2021        A1C RESULTS:  No results found for: \"A1C\"    INR RESULTS:  No results found for: \"INR\"    Procedures:      Assessment and Plan: ***      CC  Patient Care Team:  Liana Friedman APRN CNP as PCP - General (Nurse Practitioner - Family)  Liana Friedman APRN CNP as Assigned PCP  Luz Figueroa MD as MD (Dermatology)  Chriss Bassett MD as Assigned Surgical Provider  Franklin Jones MD as MD (Cardiovascular Disease)  LIANA FRIEDMAN    "

## 2025-01-16 NOTE — Clinical Note
1/16/2025      RE: Gerardo Mandujano  [address]  [facility] [address]       Dear Colleague,    Thank you for the opportunity to participate in the care of your patient, Gerardo Mandujano, at the [facility]. Please see a copy of my visit note below.    No notes on file    Please do not hesitate to contact me if you have any questions/concerns.     Sincerely,     Franklin Jones MD

## 2025-01-16 NOTE — PATIENT INSTRUCTIONS
Referral placed to [facility].  They will contact you to schedule.     Follow up as needed.

## 2025-01-20 LAB
ATRIAL RATE - MUSE: 91 BPM
DIASTOLIC BLOOD PRESSURE - MUSE: NORMAL MMHG
INTERPRETATION ECG - MUSE: NORMAL
P AXIS - MUSE: 73 DEGREES
PR INTERVAL - MUSE: 146 MS
QRS DURATION - MUSE: 80 MS
QT - MUSE: 356 MS
QTC - MUSE: 437 MS
R AXIS - MUSE: 67 DEGREES
SYSTOLIC BLOOD PRESSURE - MUSE: NORMAL MMHG
T AXIS - MUSE: 61 DEGREES
VENTRICULAR RATE- MUSE: 91 BPM

## 2025-01-23 ENCOUNTER — OFFICE VISIT (OUTPATIENT)
Dept: PODIATRY | Facility: CLINIC | Age: 55
End: 2025-01-23
Payer: COMMERCIAL

## 2025-01-23 VITALS — DIASTOLIC BLOOD PRESSURE: 80 MMHG | WEIGHT: 131 LBS | SYSTOLIC BLOOD PRESSURE: 140 MMHG | BODY MASS INDEX: 23.33 KG/M2

## 2025-01-23 DIAGNOSIS — M21.41 PES PLANUS OF BOTH FEET: Primary | ICD-10-CM

## 2025-01-23 DIAGNOSIS — Z87.312 HISTORY OF STRESS FRACTURE: ICD-10-CM

## 2025-01-23 DIAGNOSIS — M21.42 PES PLANUS OF BOTH FEET: Primary | ICD-10-CM

## 2025-01-23 DIAGNOSIS — M20.5X2 HALLUX LIMITUS, LEFT: ICD-10-CM

## 2025-01-23 DIAGNOSIS — M20.5X1 HALLUX LIMITUS, RIGHT: ICD-10-CM

## 2025-01-23 NOTE — PROGRESS NOTES
ASSESSMENT:  Encounter Diagnoses   Name Primary?    Pes planus of both feet Yes    Hallux limitus, right     Hallux limitus, left     History of stress fracture      MEDICAL DECISION MAKING:  New custom molded orthoses were prescribed.  I discussed the reason for the requested reverse Frank's modification, given her hallux limitus.  We also discussed how stiffer soled shoes can help and she reports being more comfortable when doing this.  She reports using a crest pad to elevate the right fourth toe.  This toe is flexible and I discussed the future option of a flexor tenotomy, if a crest pad is no longer helping reduce discomfort.  No other concerns today.  No other findings.    Disclaimer: This note consists of symbols derived from keyboarding, dictation and/or voice recognition software. As a result, there may be errors in the script that have gone undetected. Please consider this when interpreting information found in this chart.    Jann Boyd DPM, FACFAS, MS    [facility] Department of Podiatry/Foot & Ankle Surgery      ____________________________________________________________________    HPI:       Gerardo ELLIS Delbert presents today requesting new orthotic devices.  I last evaluated her 8/3/2023.  Devices were previously prescribed due to history of stress fracture as well as pes planus of both feet.  Exercise activities include walking, swimming and strength training.    *  Past Medical History:   Diagnosis Date    Allergies     Seasonal    Tension headache    *  *  Past Surgical History:   Procedure Laterality Date    C ANESTH, SECTION      LAPAROSCOPIC HYSTERECTOMY SUPRACERVICAL  2012    benign path   *  *  Current Outpatient Medications   Medication Sig Dispense Refill    Calcium-Magnesium-Zinc 333-133-5 MG TABS per tablet Take 1 tablet by mouth daily      ibuprofen 200 MG capsule Take 200 mg by mouth as needed      lisinopril (ZESTRIL) 10 MG tablet Take 1 tablet (10 mg) by mouth daily 90  tablet 3    VITAMIN D, CHOLECALCIFEROL, PO Take  by mouth daily.      diclofenac (VOLTAREN) 1 % topical gel Apply 2 g topically 4 times daily 350 g 1         EXAM:    Vitals: /80   Wt 59.4 kg (131 lb)   LMP 11/05/2012   BMI 23.33 kg/m    BMI: Body mass index is 23.33 kg/m .    Vasc:      Pedal pulses are palpable for the dorsalis pedis posterior tibial artery, bilateral foot.  Capillary fill time </= 3 seconds  Pedal skin appears well-perfused  Neuro:      Light touch sensation intact to all sensory nerve distributions, bilateral foot.  No apparent spastic contractures or other deformity secondary to neurologic compromise.  Derm:      No calluses  No wounds   No worrisome lesions  MSK:      With loading of the bilateral forefoot, there is minimal ability for the first metatarsal phalangeal joint to dorsiflexion.  Some range of motion is preserved when the first metatarsal is plantarflexed during forefoot loading.  Bilateral pes planus  Bilateral lower extremity muscle strength presents is normal.  Adequate ankle and subtalar joint range of motion  Calf:    Neg for redness, swelling or tenderness

## 2025-01-23 NOTE — LETTER
2025      Gerardo Mandujano  [address]  [facility] [address]      Dear Colleague,    Thank you for referring your patient, Gerardo Mandujano, to the [facility]OD[facility]. Please see a copy of my visit note below.    ASSESSMENT:  Encounter Diagnoses   Name Primary?     Pes planus of both feet Yes     Hallux limitus, right      Hallux limitus, left      History of stress fracture      MEDICAL DECISION MAKING:  New custom molded orthoses were prescribed.  I discussed the reason for the requested reverse Frank's modification, given her hallux limitus.  We also discussed how stiffer soled shoes can help and she reports being more comfortable when doing this.  She reports using a crest pad to elevate the right fourth toe.  This toe is flexible and I discussed the future option of a flexor tenotomy, if a crest pad is no longer helping reduce discomfort.  No other concerns today.  No other findings.    Disclaimer: This note consists of symbols derived from keyboarding, dictation and/or voice recognition software. As a result, there may be errors in the script that have gone undetected. Please consider this when interpreting information found in this chart.    Jann Boyd, BLADIMIR, FACFAS, MS    [facility] Department of Podiatry/Foot & Ankle Surgery      ____________________________________________________________________    HPI:       Gerardo Mandujano presents today requesting new orthotic devices.  I last evaluated her 8/3/2023.  Devices were previously prescribed due to history of stress fracture as well as pes planus of both feet.  Exercise activities include walking, swimming and strength training.    *  Past Medical History:   Diagnosis Date     Allergies     Seasonal     Tension headache    *  *  Past Surgical History:   Procedure Laterality Date     C ANESTH, SECTION       LAPAROSCOPIC HYSTERECTOMY SUPRACERVICAL  2012    benign path   *  *  Current Outpatient Medications    Medication Sig Dispense Refill     Calcium-Magnesium-Zinc 333-133-5 MG TABS per tablet Take 1 tablet by mouth daily       ibuprofen 200 MG capsule Take 200 mg by mouth as needed       lisinopril (ZESTRIL) 10 MG tablet Take 1 tablet (10 mg) by mouth daily 90 tablet 3     VITAMIN D, CHOLECALCIFEROL, PO Take  by mouth daily.       diclofenac (VOLTAREN) 1 % topical gel Apply 2 g topically 4 times daily 350 g 1         EXAM:    Vitals: /80   Wt 59.4 kg (131 lb)   LMP 11/05/2012   BMI 23.33 kg/m    BMI: Body mass index is 23.33 kg/m .    Vasc:      Pedal pulses are palpable for the dorsalis pedis posterior tibial artery, bilateral foot.  Capillary fill time </= 3 seconds  Pedal skin appears well-perfused  Neuro:      Light touch sensation intact to all sensory nerve distributions, bilateral foot.  No apparent spastic contractures or other deformity secondary to neurologic compromise.  Derm:      No calluses  No wounds   No worrisome lesions  MSK:      With loading of the bilateral forefoot, there is minimal ability for the first metatarsal phalangeal joint to dorsiflexion.  Some range of motion is preserved when the first metatarsal is plantarflexed during forefoot loading.  Bilateral pes planus  Bilateral lower extremity muscle strength presents is normal.  Adequate ankle and subtalar joint range of motion  Calf:    Neg for redness, swelling or tenderness        Again, thank you for allowing me to participate in the care of your patient.        Sincerely,        Jann Boyd DPM    Electronically signed

## 2025-01-23 NOTE — PATIENT INSTRUCTIONS
Thank you for choosing [facility] / [facility]!    DR. COSTELLO'S CLINIC LOCATIONS:     [facility] TRIAGE LINE: [phone]   [address] [place] [address] APPOINTMENTS: [phone]   [place] [address] RADIOLOGY: [phone]   (Every other Tues - Wed - Fri PM) SET UP SURGERY: [phone]    PHYSICAL THERAPY: [phone]   [place] SPECIAL[facility] BILLING QUESTIONS: [phone]   [address] FAX: [phone]   [place] [address]    (Thurs & Fri AM)        [facility] LOCATIONS  [facility] [place] [id number]  [place] [address]  Phone: [phone]  Fax: [phone] [facility]   [address]  [place] [address]  Phone: [phone]  Fax: [phone]   [facility] [place]  [address]  [place] [address]  Phone: [phone]  Fax: [phone] [facility]  [address]  [place] [address]  Phone: [phone]   Fax: [phone]   * Please call any location listed to make an appointment for a casting/fitting. Your referral was sent to their central office and they will all have the order on file.       DEGENERATIVE ARTHRITIS OF THE BIG TOE JOINT   (hallux limitus/hallux rigidus)   Arthritis of the joint at the base of the big toe (metatarsophalangeal joint) has several causes. Usually it results from repetitive trauma to the joint, secondary to abnormal foot mechanics. Often it is hereditary. However, a one-time traumatic event can lead to arthritis. The condition doesn't improve with time, and even with treatment, can worsen. The cartilage wears out, joint surfaces are no longer smooth, bone rubs on bone, inflammation occurs with pain, and eventually bone spurs and loose fragments might develop.   The joint is often painful with activity, worse with flimsy shoes or walking barefoot, and it slowly progresses over time. A person might  "notice the toe \"locking up\" with walking. There often is an obvious, and irritating, bony bump on top of the foot. Shoes might be uncomfortable. In some people the pain is so bothersome that recreational activities sometimes even normal daily activities are difficult to perform.   The pain from this arthritis is likely a combination of joint jamming, cartilage loss and inflammation, and irritation from shoes rubbing on the bump. Sometimes other parts of the foot, leg, or back hurt from altering one's walk to compensate for the painful joint.     Ways to help a person live with the discomfort include wearing a good, supportive shoe with a rigid, rocker-type bottom. An example is a hiking boot. A rigid sole minimizes bending of the joint, and therefore, joint motion and pain. Shoes with a high toe box allow for less rubbing on the bump. Avoiding barefoot walking, sandals, flip-flops and slippers usually helps.   Sometimes an insert or orthotic provides symptom relief. This might make shoe fit more difficult. Pads over the bump and occasionally injections into the joint provide relief.   Surgery for this condition is aimed towards alleviating pain. It does not cure the arthritis nor does it guarantee better joint motion. Depending on the condition of the metatarsophalangeal joint, there are several surgiqal options:    1.  Cutting off the bony bump(s) and cleaning the joint    2.  Loosening the joint up by making cuts in the first metatarsal bone or the big toe bone and removing a small section of bone.    3.  Repositioning bone to minimize jamming of the joint.    4.  In severe cases, the joint is fused. By fusing the joint, it will never bend again. This resolves the pain, because it's the movement of a worn out joint that causes pain. Oftentimes the operation involves a combination of these procedures and. requires the use of screws, pins, and/or a small surgical plate.     Healing after surgery requires about six " weeks of protection. This allows the bone to heal. Maximum recovery takes about one year. The scar tissue and joint structures require this amount of time to finish the healing process. Expect stiffness, swelling and numbness during that time frame.   Surgery for arthritis of the metatarsophalangeal joint does involve side effects. Some side effects are predictable and others are less common but do occur. A scar will be visible and could be irritated by shoes. The shoe may rub on the screw or internal pin requiring surgical removal of these fixation devices. The screw and pin would likely be left in place for a full year. The first toe may remain stiff after surgery. The amount of stiffness is variable. Most people never regain normal motion of the first toe. This is due to scar tissue inherent to any surgery, in addition to the cumUlative effects of arthritis. Sometimes the big toe drifts to one side or the other. Joint fusion is one option to correct an unstable, drifting toe. This procedure straightens the toe, however, no motion remains.   All surgical procedures involve risk of infection, numbness, pain, delayed bone healing, osteotomy (bone cut) dislocation, blood clots, continued foot pain, etc. Arthritic joint surgery is quite complex and should not be taken lightly.    Any skin incision can lead to infection. Deep infection might involve the bone and thus repeat surgery and six weeks of IV antibiotics. Scar tissue can cause nerve pain or numbness. his is generally temporary but can be permanent. We do not have treatments that cure nerve problems. Second toe pain could be related to altered mechanics and pressure transferred to the second toe. Delayed bone healing would lengthen the healing time. Some bones simply do not heal. This requires repeat surgery, electronic bone stimulation and/or extended protection. Smokers have an approximate 20% chance of poor bone healing. This is double that of a non-smoker.  The bone cut may displace. This may need to be repaired with a second operation. Displacement can cause joint malalignment. Immobility after surgery can cause a blood clot in the legs and lungs. This could result in death.   Foot pain is complex. Most feet hurt for more than one reason. Operating on the arthritic   big toe joint will not necessarily create a pain free foot. Appropriate shoes, healthy body weight, avoidance of bare foot walking and moderation of activity will always be   necessary to enjoy foot comfort. Arthritis is incurable even with surgery.     Surgery for this type of arthritis is nevertheless quite successful. Most surgical patients are pleased with their foot following surgery. Many of the issues described above can be controlled by taking proper care of your foot during the healing process.   Cosmetic bump surgery is discouraged for the reasons listed above. A bump and joint that is comfortable when wearing appropriate shoes should simply be treated with appropriate shoes.   Your surgeon would be happy to fully describe any of the above issues. You should pursue a full understanding of the operation, recovery process and any potential problems that could develop.

## 2025-02-13 ENCOUNTER — PATIENT OUTREACH (OUTPATIENT)
Dept: CARE COORDINATION | Facility: CLINIC | Age: 55
End: 2025-02-13
Payer: COMMERCIAL

## 2025-03-03 SDOH — HEALTH STABILITY: PHYSICAL HEALTH: ON AVERAGE, HOW MANY DAYS PER WEEK DO YOU ENGAGE IN MODERATE TO STRENUOUS EXERCISE (LIKE A BRISK WALK)?: 7 DAYS

## 2025-03-03 SDOH — HEALTH STABILITY: PHYSICAL HEALTH: ON AVERAGE, HOW MANY MINUTES DO YOU ENGAGE IN EXERCISE AT THIS LEVEL?: 30 MIN

## 2025-03-03 ASSESSMENT — SOCIAL DETERMINANTS OF HEALTH (SDOH): HOW OFTEN DO YOU GET TOGETHER WITH FRIENDS OR RELATIVES?: TWICE A WEEK

## 2025-03-04 ENCOUNTER — OFFICE VISIT (OUTPATIENT)
Dept: FAMILY MEDICINE | Facility: CLINIC | Age: 55
End: 2025-03-04
Payer: COMMERCIAL

## 2025-03-04 VITALS
BODY MASS INDEX: 23.92 KG/M2 | HEART RATE: 78 BPM | DIASTOLIC BLOOD PRESSURE: 68 MMHG | HEIGHT: 63 IN | OXYGEN SATURATION: 98 % | SYSTOLIC BLOOD PRESSURE: 128 MMHG | RESPIRATION RATE: 17 BRPM | WEIGHT: 135 LBS | TEMPERATURE: 98.1 F

## 2025-03-04 DIAGNOSIS — I10 BENIGN ESSENTIAL HYPERTENSION: ICD-10-CM

## 2025-03-04 DIAGNOSIS — R29.898 WEAKNESS OF LEFT FOOT: ICD-10-CM

## 2025-03-04 DIAGNOSIS — M21.622 TAILOR'S BUNION OF LEFT FOOT: ICD-10-CM

## 2025-03-04 DIAGNOSIS — E78.41 ELEVATED LIPOPROTEIN(A): ICD-10-CM

## 2025-03-04 DIAGNOSIS — F51.01 PRIMARY INSOMNIA: ICD-10-CM

## 2025-03-04 DIAGNOSIS — Z00.00 ROUTINE GENERAL MEDICAL EXAMINATION AT A HEALTH CARE FACILITY: Primary | ICD-10-CM

## 2025-03-04 PROCEDURE — 90472 IMMUNIZATION ADMIN EACH ADD: CPT | Performed by: NURSE PRACTITIONER

## 2025-03-04 PROCEDURE — 90677 PCV20 VACCINE IM: CPT | Performed by: NURSE PRACTITIONER

## 2025-03-04 PROCEDURE — 3074F SYST BP LT 130 MM HG: CPT | Performed by: NURSE PRACTITIONER

## 2025-03-04 PROCEDURE — 90707 MMR VACCINE SC: CPT | Performed by: NURSE PRACTITIONER

## 2025-03-04 PROCEDURE — 99214 OFFICE O/P EST MOD 30 MIN: CPT | Mod: 25 | Performed by: NURSE PRACTITIONER

## 2025-03-04 PROCEDURE — 99396 PREV VISIT EST AGE 40-64: CPT | Mod: 25 | Performed by: NURSE PRACTITIONER

## 2025-03-04 PROCEDURE — 3078F DIAST BP <80 MM HG: CPT | Performed by: NURSE PRACTITIONER

## 2025-03-04 PROCEDURE — 90471 IMMUNIZATION ADMIN: CPT | Performed by: NURSE PRACTITIONER

## 2025-03-04 PROCEDURE — G2211 COMPLEX E/M VISIT ADD ON: HCPCS | Performed by: NURSE PRACTITIONER

## 2025-03-04 PROCEDURE — 1126F AMNT PAIN NOTED NONE PRSNT: CPT | Performed by: NURSE PRACTITIONER

## 2025-03-04 RX ORDER — LISINOPRIL 10 MG/1
10 TABLET ORAL DAILY
Qty: 90 TABLET | Refills: 3 | Status: SHIPPED | OUTPATIENT
Start: 2025-03-04

## 2025-03-04 ASSESSMENT — PAIN SCALES - GENERAL: PAINLEVEL_OUTOF10: NO PAIN (0)

## 2025-03-04 NOTE — PROGRESS NOTES
Preventive Care Visit  [facility] INTEGRATED PRIMARY CARE  MANUEL Lopez CNP, Nurse Practitioner - Family  Mar 4, 2025      Assessment & Plan     Routine general medical examination at a health care facility  Reviewed and updated health maintenance and recommendations Up to date on screenings. Due for PCV booster and opted for MMR booster (had shots in [place] and unknown at time of appt if it was the live or inactivated vaccine).     Benign essential hypertension  Well controlled. Refills provided. Recent labs WNL.   - lisinopril (ZESTRIL) 10 MG tablet; Take 1 tablet (10 mg) by mouth daily.    Tailor's bunion of left foot  New concern on lateral aspect of left fifth toe. Encouraged to use exercise for management at this time. Also encouraged soaking of feet in warm water to help with pain management.     Weakness of left foot  Ongoing concern. Well managed with orthotics. Encouraged patient to continue with exercises learned from previous physical therapy encounter.     Elevated lipoprotein(a)  Elevated lipoprotein a on lab work. Has followed up with cardiology who has recommended a coronary artery calcium score and heart healthy diet. She was also referred to [facility] for cardiovascular prevention. No current CV symptoms and HTN well managed.     Primary Insomnia  Intermittent difficulty with staying asleep at night largely related to feelings of anxiety about her child. Not currently using any treatments and not interested in medication management at this time. Encouraged mediation and continue reading to help fall back asleep. Also encouraged continuing to meet with her support group to help with coping.     Patient has been advised of split billing requirements and indicates understanding: Yes    Counseling  Appropriate preventive services were addressed with this patient via screening, questionnaire, or discussion as appropriate for fall prevention, nutrition, physical activity,  Tobacco-use cessation, social engagement, weight loss and cognition.  Checklist reviewing preventive services available has been given to the patient.  Reviewed patient's diet, addressing concerns and/or questions.     Patient Instructions   Every woman's guide to Foot Pain Relief by Brooklyn Vogel    Try meditation at night time to help get back to sleep    The longitudinal plan of care for the diagnosis(es)/condition(s) as documented were addressed during this visit. Due to the added complexity in care, I will continue to support Gerardo in the subsequent management and with ongoing continuity of care.Review of prior external note(s) from - Outside records from cardiology  Ordering of each unique test  Prescription drug management      Subjective   Gerardo is a 54 year old, presenting for the following:  Physical        3/4/2025    10:52 AM   Additional Questions   Roomed by Colin RUFFIN        [facility] -    Screenings - up to date on screening   Immunizations - due for PCV  Habits  -   Exercise - walking, weight lifting       Nutrition - vegetarian/vegan diet, 1/2 can of soda few times throughout the week     Mental health/mindfulness - doing ok, anxiety about son's mental health, goes to a support group which has been very helpful     Alcohol/cigarettes - none   Sleep - difficult staying asleep due to mind racing, good sleep hygiene  Sexual health - sexually active with female partner, no concerns of pelvic pain, hysterectomy in 2012- no concerns with vaginal dryness or irritation   Annual health goal - Mental health management and sleep improvement    Left foot pain/weakness-  New orthotic is helping. Has been to physical therapy once in the past and knows the exercises she needs to do to strengthen her left foot but has not been doing them. Has noticed a bunion on the left foot along the pinky that has been causing some pain.     Insomnia-  Difficulty staying asleep at night. No trouble falling asleep. Largely  attributes this to the anxiety she is having about her son's mental health. She wakes up with racing thought and takes some time to go back to sleep. She will sometimes read on her phone which helps her feel tired and go back to sleep.     Hypertension Follow-up    Do you check your blood pressure regularly outside of the clinic? Yes   Are you following a low salt diet? Yes  Are your blood pressures ever more than 140 on the top number (systolic) OR more   than 90 on the bottom number (diastolic), for example 140/90? No  - Denies chest pain, palpitations, headaches, dizziness, or vision changes.   - Taking Lisinopril regularly without side effects    BP Readings from Last 6 Encounters:   03/04/25 128/68   01/23/25 140/80   01/16/25 114/75   08/21/24 129/82   03/15/24 104/58   08/03/23 109/70       Advance Care Planning  Patient does not have a Health Care Directive: Patient states has Advance Directive and will bring in a copy to clinic.      3/3/2025   General Health   How would you rate your overall physical health? Good   Feel stress (tense, anxious, or unable to sleep) Only a little   (!) STRESS CONCERN      3/3/2025   Nutrition   Three or more servings of calcium each day? Yes   Diet: Vegetarian/vegan   How many servings of fruit and vegetables per day? 4 or more   How many sweetened beverages each day? 0-1         3/3/2025   Exercise   Days per week of moderate/strenous exercise 7 days   Average minutes spent exercising at this level 30 min         3/3/2025   Social Factors   Frequency of gathering with friends or relatives Twice a week   Worry food won't last until get money to buy more No   Food not last or not have enough money for food? No   Do you have housing? (Housing is defined as stable permanent housing and does not include staying ouside in a car, in a tent, in an abandoned building, in an overnight shelter, or couch-surfing.) Yes   Are you worried about losing your housing? No   Lack of  transportation? No   Unable to get utilities (heat,electricity)? No         3/3/2025   Fall Risk   Fallen 2 or more times in the past year? No   Trouble with walking or balance? No          3/3/2025   Dental   Dentist two times every year? Yes         3/10/2024   TB Screening   Were you born outside of the [place]? No         Today's PHQ-2 Score:       3/3/2025    12:31 PM   PHQ-2 ( 1999 [employer])   Q1: Little interest or pleasure in doing things 0   Q2: Feeling down, depressed or hopeless 0   PHQ-2 Score 0    Q1: Little interest or pleasure in doing things Not at all   Q2: Feeling down, depressed or hopeless Not at all   PHQ-2 Score 0       Patient-reported           3/3/2025   Substance Use   Alcohol more than 3/day or more than 7/wk No   Do you use any other substances recreationally? No     Social History     Tobacco Use    Smoking status: Never    Smokeless tobacco: Never   Vaping Use    Vaping status: Never Used   Substance Use Topics    Alcohol use: Yes     Comment: 0-1 drink per week    Drug use: No           12/9/2024   LAST FHS-7 RESULTS   Any relative bilateral breast cancer No   Any male have breast cancer No   Any ONE woman have BOTH breast AND ovarian cancer No   Any woman with breast cancer before 50yrs No        Mammogram Screening - Mammogram every 1-2 years updated in Health Maintenance based on mutual decision making        3/3/2025   STI Screening   New sexual partner(s) since last STI/HIV test? No     History of abnormal Pap smear: No - age 30- 64 PAP with HPV every 5 years recommended        Latest Ref Rng & Units 1/15/2021    10:45 AM 1/15/2021    10:37 AM 10/2/2015    10:45 AM   PAP / HPV   PAP (Historical)   NIL     HPV 16 DNA NEG^Negative Negative   Negative    HPV 18 DNA NEG^Negative Negative   Negative    Other HR HPV NEG^Negative Negative   Negative      ASCVD Risk   The 10-year ASCVD risk score (Traci KATE, et al., 2019) is: 2.9%    Values used to calculate the score:      Age: 54  years      Sex: Female      Is Non- : No      Diabetic: No      Tobacco smoker: No      Systolic Blood Pressure: 128 mmHg      Is BP treated: Yes      HDL Cholesterol: 50 mg/dL      Total Cholesterol: 214 mg/dL    Fracture Risk Assessment Tool  Link to Frax Calculator  Use the information below to complete the Frax calculator  : 1970  Sex: female  Weight (kg): 61.2 kg (actual weight)  Height (cm): 159.6 cm  Previous Fragility Fracture:  No  History of parent with fractured hip:  No  Current Smoking:  No  Patient has been on glucocorticoids for more than 3 months (5mg/day or more): No  Rheumatoid Arthritis on Problem List:  No  Secondary Osteoporosis on Problem List:  No  Consumes 3 or more units of alcohol per day: No  Femoral Neck BMD (g/cm2)           Reviewed and updated as needed this visit by Provider   Tobacco     Med Hx  Surg Hx  Fam Hx  Soc Hx Sexual Activity          Past Medical History:   Diagnosis Date    Allergies     Seasonal    Tension headache      Past Surgical History:   Procedure Laterality Date    C ANESTH, SECTION      LAPAROSCOPIC HYSTERECTOMY SUPRACERVICAL  2012    benign path     Labs reviewed in EPIC  BP Readings from Last 3 Encounters:   25 128/68   25 140/80   25 114/75    Wt Readings from Last 3 Encounters:   25 61.2 kg (135 lb)   25 59.4 kg (131 lb)   25 59.4 kg (131 lb)                  Current Outpatient Medications   Medication Sig Dispense Refill    Calcium-Magnesium-Zinc 333-133-5 MG TABS per tablet Take 1 tablet by mouth daily      ibuprofen 200 MG capsule Take 200 mg by mouth as needed      lisinopril (ZESTRIL) 10 MG tablet Take 1 tablet (10 mg) by mouth daily. 90 tablet 3    VITAMIN D, CHOLECALCIFEROL, PO Take  by mouth daily.           Review of Systems  Constitutional, neuro, ENT, endocrine, pulmonary, cardiac, gastrointestinal, genitourinary, musculoskeletal, integument and psychiatric  "systems are negative, except as otherwise noted.     Objective    Exam  /68   Pulse 78   Temp 98.1  F (36.7  C) (Temporal)   Resp 17   Ht 1.596 m (5' 2.84\")   Wt 61.2 kg (135 lb)   LMP 11/05/2012   SpO2 98%   BMI 24.04 kg/m     Estimated body mass index is 24.04 kg/m  as calculated from the following:    Height as of this encounter: 1.596 m (5' 2.84\").    Weight as of this encounter: 61.2 kg (135 lb).    Physical Exam  GENERAL: alert and no distress  EYES: Eyes grossly normal to inspection, PERRL and conjunctivae and sclerae normal  HENT: ear canals and TM's normal, nose and mouth without ulcers or lesions  NECK: no adenopathy, no asymmetry, masses, or scars  RESP: lungs clear to auscultation - no rales, rhonchi or wheezes  BREAST: normal without masses, tenderness or nipple discharge and no palpable axillary masses or adenopathy  CV: regular rate and rhythm, normal S1 S2, no S3 or S4, no murmur, click or rub, no peripheral edema, 2+ DP and PT pulses bilaterally, capillary refill 2 seconds   ABDOMEN: soft, nontender, no hepatosplenomegaly, no masses and bowel sounds normal  MS: no gross musculoskeletal defects noted, no edema, mild erythema and bony enlargement of the lateral aspect of left fifth toe, full range of motion of left foot with dorsiflexion, plantar flexion, eversion, and inversion, pain with inversion, strength 5/5 on left foot with dorsiflexion and plantar flexion   SKIN: no suspicious lesions or rashes  NEURO: Normal strength and tone, mentation intact and speech normal, gait normal   PSYCH: mentation appears normal, affect normal/bright    Note by Yovani Maxwell RN, DNP Student   Present and preformed physical exam with student nurse-family practice. The patient was evaluated and managed, by Yovani Maxwell RN, \[facility] [place] [facility]\"" in collaboration with MANUEL Jenkins, BAM supervising clinical preceptor.    Documentation reviewed and written by MANUEL Jenkins CNP      Signed Electronically " by: MANUEL Lopez CNP

## 2025-03-04 NOTE — PATIENT INSTRUCTIONS
Every woman's guide to Foot Pain Relief by Brooklyn Vogel    Try meditation at night time to help get back to sleep

## 2025-04-08 ENCOUNTER — TELEPHONE (OUTPATIENT)
Dept: DERMATOLOGY | Facility: CLINIC | Age: 55
End: 2025-04-08
Payer: COMMERCIAL

## 2025-04-08 NOTE — TELEPHONE ENCOUNTER
[facility] Call Center    Phone Message    May a detailed message be left on voicemail: yes     Reason for Call: Other:   Pt returning call to reschedule Dr. Figueroa appt. Says ok to use FELICE slot but most POA is a double book. Is it ok to schedule an overbook? Please call pt to reschedule. Thank you    Action Taken: TE SENT    Travel Screening: Not Applicable     Date of Service:        Thank you!  [facility]

## 2025-04-12 DIAGNOSIS — Z13.6 CARDIOVASCULAR SCREENING; LDL GOAL LESS THAN 160: ICD-10-CM

## 2025-04-12 DIAGNOSIS — I10 BENIGN ESSENTIAL HYPERTENSION: Primary | ICD-10-CM

## 2025-04-17 ENCOUNTER — LAB (OUTPATIENT)
Dept: LAB | Facility: CLINIC | Age: 55
End: 2025-04-17
Payer: COMMERCIAL

## 2025-04-17 ENCOUNTER — OFFICE VISIT (OUTPATIENT)
Dept: CARDIOLOGY | Facility: CLINIC | Age: 55
End: 2025-04-17
Payer: COMMERCIAL

## 2025-04-17 VITALS
SYSTOLIC BLOOD PRESSURE: 137 MMHG | DIASTOLIC BLOOD PRESSURE: 86 MMHG | BODY MASS INDEX: 23.8 KG/M2 | HEIGHT: 63 IN | OXYGEN SATURATION: 98 % | HEART RATE: 80 BPM | WEIGHT: 134.3 LBS

## 2025-04-17 DIAGNOSIS — I10 BENIGN ESSENTIAL HYPERTENSION: ICD-10-CM

## 2025-04-17 DIAGNOSIS — I10 BENIGN ESSENTIAL HYPERTENSION: Primary | ICD-10-CM

## 2025-04-17 DIAGNOSIS — Z13.6 CARDIOVASCULAR SCREENING; LDL GOAL LESS THAN 160: ICD-10-CM

## 2025-04-17 LAB
CREAT UR-MCNC: 95.5 MG/DL
CRP SERPL HS-MCNC: 1.05 MG/L
MICROALBUMIN UR-MCNC: <12 MG/L
MICROALBUMIN/CREAT UR: NORMAL MG/G{CREAT}

## 2025-04-17 PROCEDURE — 86141 C-REACTIVE PROTEIN HS: CPT | Performed by: NURSE PRACTITIONER

## 2025-04-17 PROCEDURE — 82043 UR ALBUMIN QUANTITATIVE: CPT | Performed by: NURSE PRACTITIONER

## 2025-04-17 PROCEDURE — 99000 SPECIMEN HANDLING OFFICE-LAB: CPT | Performed by: PATHOLOGY

## 2025-04-17 PROCEDURE — 82570 ASSAY OF URINE CREATININE: CPT | Performed by: NURSE PRACTITIONER

## 2025-04-17 NOTE — PROGRESS NOTES
Martinez Test Results    WALKING BLOOD PRESSURE RESPONSE (3 minute, 5 MET level walk)   Pre BP: 112/72 mmHg  3 min BP: 124/70 mmHg  1 min post BP: 100/68 mmHg    Pre HR: 70 bpm  3 min HR: 120 bpm  1 min post HR: 68 bpm     Test results: Walking blood pressure response to 3 minutes activity is in normal range.     RETINAL VASCULAR ASSESSMENT   Left Eye Abnormality:  none  AV Ratio: 0.8    Right Eye Abnormality:  none  AV Ratio: 0.8     Retinal Assessment:  normal    ABDOMINAL AORTA ULTRASOUND (< 2.5 normal, borderline 2.5-2.9, abnormal > 3)   SupraIliac 1.73 cm    SupraRenal 1.63 cm    InfraRenal Proximal 1.79 cm    InfraRenal Distal 1.73 cm      Abdominal Aorta Assessment:  normal    LEFT VENTRICULAR ULTRASOUND MEASUREMENTS (adjusted for BSA)  LVIDD 41.4 mm   Septa 9 mm   Posterior 9 mm     Left Ventricular US Assessment:  normal    Carotid Artery IMT measurements report and plaques in the small area examined:   Left IMT 0.563 mm  Plaques none    Right IMT 0.675 mm  Plaques none     Test results: Carotid arteries wall thickening is in normal range with no plaque formations present.     ECG (see tracing):  normal sinus rhythm;  rate: 91 bpm    Arterial Elasticity per age and gender (see printout):   C1 11.8 mL/mmHg x 10  normal   C2 5.2 mL/mmHg x 100 normal   Supine blood pressure: 120/73 mmHg     Test results: Arterial elasticity of the large and small size arteries is in normal range range after adjusting for age and gender.     Martinez disease score:2     Maureen Campuzano

## 2025-04-17 NOTE — PROGRESS NOTES
[facility] for Cardiovascular Disease Prevention - Exam Note    Active Problems   Patient Active Problem List    Diagnosis Date Noted    Acute low back pain, unspecified back pain laterality, unspecified whether sciatica present 11/17/2023     Priority: Medium    Postural urinary incontinence 10/12/2023     Priority: Medium    Pelvic floor dysfunction 10/12/2023     Priority: Medium    Left leg weakness 10/06/2023     Priority: Medium    Weakness of left foot 10/06/2023     Priority: Medium    Benign essential hypertension 04/10/2019     Priority: Medium    Spasm of muscle 03/13/2015     Priority: Medium    Arthralgia of temporomandibular joint 03/13/2015     Priority: Medium    Headache 04/02/2014     Priority: Medium     Problem list name updated by automated process. Provider to review      CARDIOVASCULAR SCREENING; LDL GOAL LESS THAN 160 05/09/2010     Priority: Medium       Reason For Visit   Patient here for [facility] early detection of atherosclerosis and CVD exam.    Pain Evaluation  Current history of pain associated with this visit is: denied    Cardiac risk factors:    - age      - smoking      - elevated BMI        + Family history CVD         - Diet           - Hypertension    HPI   Gerardo Mandujano is a 54 year old year old female with a history of hypertension and hyperlipidemia.  She takes 10 mg of lisinopril. She checks her BP at home 1x/month. Her family history of CV disease includes her mother having hyperlipidemia and a CVA at age 77, her father has hypertension and hyperlipidemia, all 3 siblings with  hyperlipidemia, one of them also has diabetes, hypertension and obesity. Her brother has hypertension and congential heart disease (unknown dx). Her primary care provider is Liana Goyal NP at [facility].  She works as an . Today in clinic she denies chest pain at rest, with activity, while sleeping, SOB at rest, with activity or while sleeping, palpitations,  lightheadedness, lower leg edema, calf cramps, indigestion, headaches or issues with her memory.     Nutrition assessment per patient report:   Foods with fat/cholesterol (fried foods, fatty meats, junk food):  0 servings   Fruits and vegetables (  cup cooked, 1 cup raw):   1-3 servings of vegetables/day and 1-3 servings of fruit/day  Caffeine (1 cup coffee, soda, etc):  1 cup of coffee/day  Alcohol servings (12 oz. beer, 4 oz. wine, 1  oz. in mixed drink):  0 servings  Special dietary habits:   limit dairy  Typical breakfast:  oatmeal with nuts and dried fruit or eggs                 Lunch: over lefts                 Dinner: protein, vegetables-beans/rice/zimmer/pasta                 Snacks: no                 Drinks:  water  Activity  Patient is active walking her dog 2x/day- 2 miles, gym-yoga, swim, [place] 3x/week    Sleep pattern: sometimes trouble staying asleep    Laboratory Results Review  We discussed laboratory results today including lipids targets and how foods influence cholesterol.    Weight  Her perceived healthy weight is 130-135 pounds.  A normal BMI of 25 is equal to 141 pounds.  The current BMI of 24.04 is normal weight range.      PMH   Past Medical History:   Diagnosis Date    Allergies     Seasonal    Benign essential hypertension     Hyperlipidemia LDL goal <100     Tension headache        PSH  Past Surgical History:   Procedure Laterality Date    C ANESTH, SECTION      LAPAROSCOPIC HYSTERECTOMY SUPRACERVICAL  2012    benign path       Current Meds   Current Outpatient Medications   Medication Sig Dispense Refill    Calcium-Magnesium-Zinc 333-133-5 MG TABS per tablet Take 1 tablet by mouth daily      ibuprofen 200 MG capsule Take 200 mg by mouth as needed      lisinopril (ZESTRIL) 10 MG tablet Take 1 tablet (10 mg) by mouth daily. 90 tablet 3    VITAMIN D, CHOLECALCIFEROL, PO Take  by mouth daily.         Allergies      Allergies   Allergen Reactions    Chlorhexidine Gluconate  Itching and Rash       Family Hx   Family History   Problem Relation Age of Onset    Gynecology Mother         fibroids    Breast Cancer Mother     Cerebrovascular Disease Mother 77    Hyperlipidemia Mother     Hypertension Father     Hyperlipidemia Father     Skin Cancer Father         melanoma    Diabetes Sister     Hypertension Sister     Hyperlipidemia Sister     Obesity Sister     Hyperlipidemia Sister     Hypertension Brother     Hyperlipidemia Brother     Congenital Anomalies Brother         valve replacement at age 30    Cancer Maternal Grandmother         throat    Parkinsonism Maternal Grandfather     Breast Cancer Paternal Grandmother     Cancer - colorectal Paternal Grandfather        Social History    She is an , 1 son     Tobacco History  History   Smoking Status    Never   Smokeless Tobacco    Never       ROS  General:  WDWN in NAD  EENT:  Denies visual disturbances, epistaxis, sore throat  Respiratory:  Denies SOB, cough, sputum production  Cardiovascular:  see HPI  GI:  Denies nausea, vomiting, hematemesis, melena  :  denies hematuria, dysuria  Skin:  Denies rashes, lesions or open wounds  Psych:  Pleasant affect    Vital Signs   LMP 11/05/2012       Waist: 33 inches  Hip: 39 inches    Physical Exam   In general, the patient is a pleasant female in no apparent distress   HEENT: NC/AT, PERRLA, EOMI, sclerae white, not injected. Nares clear, pharynx without erythema or exudate, dentition intact    Neck: No adenopathy, no thyromegaly, carotids +4/4 bilaterally without bruits,  no jugular venous distension   Lungs: Breath sounds clear bilaterally, without crackles, ronchi, or wheezes  Cor: RRR, S1S2 without murmur, rub, click, or gallop, the PMI is in the 5th ICS in the midclavicular line  Abdomen: Soft, nontender, nondistended, BS+ in all 4 quadrants, without hepatomegaly, no aorta or renal artery bruits  Extremities: No clubbing, cyanosis, or edema. DP and PT pulses +2/4 bilaterally    The  "10-year ASCVD risk score (Lan BRAVO Jr., et al., 2013) is: 3.3%  Values used to calculate the score:   Age: 54 year old   Sex: female   Is Non- : No   Diabetic: No   Tobacco smoker: No   Systolic Blood Press: 137 mmHg   Is BP treated: Yes   HDL Cholesterol: 50 mg/dL   Total Cholesterol: 214 mg/dL    Recent Labs  Lab Results   Component Value Date    GLC 90 01/15/2025    GLC 94 02/04/2022    GLC 82 01/15/2021      No results found for: \"NTBNP\"  No results found for: \"NTBNPI\"   Lab Results   Component Value Date    UCRR 127.0 03/20/2024    UCRR 94 01/15/2021      Lab Results   Component Value Date    MICROL 13.5 03/20/2024    MICROL 6 01/15/2021      No results found for: \"MICROALBUMIN\"   No results found for: \"CRP\"   Lab Results   Component Value Date    CHOL 214 (H) 01/15/2025    CHOL 202 (H) 01/15/2021      Lab Results   Component Value Date    TRIG 90 01/15/2025    TRIG 77 01/15/2021      Lab Results   Component Value Date    HDL 50 01/15/2025    HDL 67 01/15/2021      Lab Results   Component Value Date     (H) 01/15/2025     (H) 01/15/2025     (H) 01/15/2021      Lab Results   Component Value Date    VLDL 11 10/07/2008      Lab Results   Component Value Date    CHOLHDLRATIO 2.4 10/07/2008     Lab Results   Component Value Date    NHDL 164 (H) 01/15/2025    NHDL 135 (H) 01/15/2021        Assessment:    Cardiovascular:  Asymptomatic, she is not complaining of chest pain, EKG revealed NSR in Jan 2025, no plaque detected in carotid arteries, recommend scheduling a CAC to further investigate her risk of CV disease    Blood Pressure:  She takes 10 mg of lisinopirl, -137/82-86 mmHg     Lipids:  She does not take a statin medication, Lp(a) level 74 10/24     Latest Ref Rng 3/15/2023  10:30 AM 8/3/2023  1:22 PM 3/15/2024  10:12 AM 3/20/2024  7:35 AM 8/21/2024  12:53 PM   BP WT  CHOL         Cholesterol <200 mg/dL 230 (H)    232 (H)     HDL Cholesterol >=50 mg/dL 68    65   "   LDL Cholesterol Calculated <100 mg/dL 144 (H)    147 (H)     LDL Cholesterol Calculated <100 mg/dL        Triglycerides <150 mg/dL 92    102        Latest Ref Rng 1/15/2025  7:18 AM   BP WT  CHOL     Cholesterol <200 mg/dL 214 (H)    HDL Cholesterol >=50 mg/dL 50    LDL Cholesterol Calculated <100 mg/dL 151 (H)    LDL Cholesterol Calculated <100 mg/dL 146 (H)    Triglycerides <150 mg/dL 90       Glucose: 90 01/25    Sleep pattern:  Sleep hygiene reviewed during clinic visit, handout given to patient    Weight Management: BMI 24.04    Return to Clinic: 5 years    Health Habit Summary:  Nutrition: Heart Healthy Eating:  most of the time   Exercise:  regularly active  Weight:  normal weight range  Tobacco Use:  never used    This case was presented to Dr. Jones and Dr. Yoko Nielsen during our weekly conference.     60 minutes spent on the date of the encounter doing (chart review/review of outside records/review of test results/interpretation of tests/patient visit/documentation/discussion with other provider(s)   MANUEL Ren CNP       CC  Patient Care Team:  Liana Goyal APRN CNP as PCP - General (Nurse Practitioner - Family)  Liana Goyal APRN CNP as Assigned PCP  Luz Figueroa MD as MD (Dermatology)  Franklin Jones MD as MD (Cardiovascular Disease)  Fatmata Allen APRN CNP as Nurse Practitioner (Cardiovascular Disease)  Franklin Jones MD as Assigned Heart and Vascular Provider  Jann Boyd DPM as Assigned Surgical Provider  Luz Figueroa MD as Assigned Dermatology Provider  SELF, REFERRED

## 2025-04-17 NOTE — LETTER
4/17/2025      RE: Gerardo Mandujano  [address]  [facility] [address]       Dear Colleague,    Thank you for the opportunity to participate in the care of your patient, Gerardo Mandujano, at the [facility] FOR CARDIOVASCULAR DISEASE PREVENTION [facility]. Please see a copy of my visit note below.       [facility] for Cardiovascular Disease Prevention - Exam Note    Active Problems   Patient Active Problem List    Diagnosis Date Noted     Acute low back pain, unspecified back pain laterality, unspecified whether sciatica present 11/17/2023     Priority: Medium     Postural urinary incontinence 10/12/2023     Priority: Medium     Pelvic floor dysfunction 10/12/2023     Priority: Medium     Left leg weakness 10/06/2023     Priority: Medium     Weakness of left foot 10/06/2023     Priority: Medium     Benign essential hypertension 04/10/2019     Priority: Medium     Spasm of muscle 03/13/2015     Priority: Medium     Arthralgia of temporomandibular joint 03/13/2015     Priority: Medium     Headache 04/02/2014     Priority: Medium     Problem list name updated by automated process. Provider to review       CARDIOVASCULAR SCREENING; LDL GOAL LESS THAN 160 05/09/2010     Priority: Medium       Reason For Visit   Patient here for [facility] early detection of atherosclerosis and CVD exam.    Pain Evaluation  Current history of pain associated with this visit is: denied    Cardiac risk factors:    - age      - smoking      - elevated BMI        + Family history CVD         - Diet           - Hypertension    HPI   Gerardo Mandujano is a 54 year old year old female with a history of hypertension and hyperlipidemia.  She takes 10 mg of lisinopril. She checks her BP at home 1x/month. Her family history of CV disease includes her mother having hyperlipidemia and a CVA at age 77, her father has hypertension and hyperlipidemia, all 3 siblings  with  hyperlipidemia, one of them also has diabetes, hypertension and obesity. Her brother has hypertension and congential heart disease (unknown dx). Her primary care provider is Liana Goyal NP at [facility].  She works as an . Today in clinic she denies chest pain at rest, with activity, while sleeping, SOB at rest, with activity or while sleeping, palpitations, lightheadedness, lower leg edema, calf cramps, indigestion, headaches or issues with her memory.     Nutrition assessment per patient report:   Foods with fat/cholesterol (fried foods, fatty meats, junk food):  0 servings   Fruits and vegetables (  cup cooked, 1 cup raw):   1-3 servings of vegetables/day and 1-3 servings of fruit/day  Caffeine (1 cup coffee, soda, etc):  1 cup of coffee/day  Alcohol servings (12 oz. beer, 4 oz. wine, 1  oz. in mixed drink):  0 servings  Special dietary habits:   limit dairy  Typical breakfast:  oatmeal with nuts and dried fruit or eggs                 Lunch: over lefts                 Dinner: protein, vegetables-beans/rice/zimmer/pasta                 Snacks: no                 Drinks:  water  Activity  Patient is active walking her dog 2x/day- 2 miles, gym-yoga, swim, [place] 3x/week    Sleep pattern: sometimes trouble staying asleep    Laboratory Results Review  We discussed laboratory results today including lipids targets and how foods influence cholesterol.    Weight  Her perceived healthy weight is 130-135 pounds.  A normal BMI of 25 is equal to 141 pounds.  The current BMI of 24.04 is normal weight range.      PMH   Past Medical History:   Diagnosis Date     Allergies     Seasonal     Benign essential hypertension      Hyperlipidemia LDL goal <100      Tension headache        PSH  Past Surgical History:   Procedure Laterality Date     C ANESTH, SECTION       LAPAROSCOPIC HYSTERECTOMY SUPRACERVICAL  2012    benign path       Current Meds   Current Outpatient Medications    Medication Sig Dispense Refill     Calcium-Magnesium-Zinc 333-133-5 MG TABS per tablet Take 1 tablet by mouth daily       ibuprofen 200 MG capsule Take 200 mg by mouth as needed       lisinopril (ZESTRIL) 10 MG tablet Take 1 tablet (10 mg) by mouth daily. 90 tablet 3     VITAMIN D, CHOLECALCIFEROL, PO Take  by mouth daily.         Allergies      Allergies   Allergen Reactions     Chlorhexidine Gluconate Itching and Rash       Family Hx   Family History   Problem Relation Age of Onset     Gynecology Mother         fibroids     Breast Cancer Mother      Cerebrovascular Disease Mother 77     Hyperlipidemia Mother      Hypertension Father      Hyperlipidemia Father      Skin Cancer Father         melanoma     Diabetes Sister      Hypertension Sister      Hyperlipidemia Sister      Obesity Sister      Hyperlipidemia Sister      Hypertension Brother      Hyperlipidemia Brother      Congenital Anomalies Brother         valve replacement at age 30     Cancer Maternal Grandmother         throat     Parkinsonism Maternal Grandfather      Breast Cancer Paternal Grandmother      Cancer - colorectal Paternal Grandfather        Social History    She is an , 1 son     Tobacco History  History   Smoking Status     Never   Smokeless Tobacco     Never       ROS  General:  WDWN in NAD  EENT:  Denies visual disturbances, epistaxis, sore throat  Respiratory:  Denies SOB, cough, sputum production  Cardiovascular:  see HPI  GI:  Denies nausea, vomiting, hematemesis, melena  :  denies hematuria, dysuria  Skin:  Denies rashes, lesions or open wounds  Psych:  Pleasant affect    Vital Signs   LMP 11/05/2012       Waist: 33 inches  Hip: 39 inches    Physical Exam   In general, the patient is a pleasant female in no apparent distress   HEENT: NC/AT, PERRLA, EOMI, sclerae white, not injected. Nares clear, pharynx without erythema or exudate, dentition intact    Neck: No adenopathy, no thyromegaly, carotids +4/4 bilaterally without  "bruits,  no jugular venous distension   Lungs: Breath sounds clear bilaterally, without crackles, ronchi, or wheezes  Cor: RRR, S1S2 without murmur, rub, click, or gallop, the PMI is in the 5th ICS in the midclavicular line  Abdomen: Soft, nontender, nondistended, BS+ in all 4 quadrants, without hepatomegaly, no aorta or renal artery bruits  Extremities: No clubbing, cyanosis, or edema. DP and PT pulses +2/4 bilaterally    The 10-year ASCVD risk score (Knippacasimiro BRAVO Jr., et al., 2013) is: 3.3%  Values used to calculate the score:   Age: 54 year old   Sex: female   Is Non- : No   Diabetic: No   Tobacco smoker: No   Systolic Blood Press: 137 mmHg   Is BP treated: Yes   HDL Cholesterol: 50 mg/dL   Total Cholesterol: 214 mg/dL    Recent Labs  Lab Results   Component Value Date    GLC 90 01/15/2025    GLC 94 02/04/2022    GLC 82 01/15/2021      No results found for: \"NTBNP\"  No results found for: \"NTBNPI\"   Lab Results   Component Value Date    UCRR 127.0 03/20/2024    UCRR 94 01/15/2021      Lab Results   Component Value Date    MICROL 13.5 03/20/2024    MICROL 6 01/15/2021      No results found for: \"MICROALBUMIN\"   No results found for: \"CRP\"   Lab Results   Component Value Date    CHOL 214 (H) 01/15/2025    CHOL 202 (H) 01/15/2021      Lab Results   Component Value Date    TRIG 90 01/15/2025    TRIG 77 01/15/2021      Lab Results   Component Value Date    HDL 50 01/15/2025    HDL 67 01/15/2021      Lab Results   Component Value Date     (H) 01/15/2025     (H) 01/15/2025     (H) 01/15/2021      Lab Results   Component Value Date    VLDL 11 10/07/2008      Lab Results   Component Value Date    CHOLHDLRATIO 2.4 10/07/2008     Lab Results   Component Value Date    NHDL 164 (H) 01/15/2025    NHDL 135 (H) 01/15/2021        Assessment:    Cardiovascular:  Asymptomatic, she is not complaining of chest pain, EKG revealed NSR in Jan 2025, no plaque detected in carotid arteries, recommend " scheduling a CAC to further investigate her risk of CV disease    Blood Pressure:  She takes 10 mg of lisinopirl, -137/82-86 mmHg     Lipids:  She does not take a statin medication, Lp(a) level 74 10/24     Latest Ref Rng 3/15/2023  10:30 AM 8/3/2023  1:22 PM 3/15/2024  10:12 AM 3/20/2024  7:35 AM 8/21/2024  12:53 PM   BP WT  CHOL         Cholesterol <200 mg/dL 230 (H)    232 (H)     HDL Cholesterol >=50 mg/dL 68    65     LDL Cholesterol Calculated <100 mg/dL 144 (H)    147 (H)     LDL Cholesterol Calculated <100 mg/dL        Triglycerides <150 mg/dL 92    102        Latest Ref Rng 1/15/2025  7:18 AM   BP WT  CHOL     Cholesterol <200 mg/dL 214 (H)    HDL Cholesterol >=50 mg/dL 50    LDL Cholesterol Calculated <100 mg/dL 151 (H)    LDL Cholesterol Calculated <100 mg/dL 146 (H)    Triglycerides <150 mg/dL 90       Glucose: 90 01/25    Sleep pattern:  Sleep hygiene reviewed during clinic visit, handout given to patient    Weight Management: BMI 24.04    Return to Clinic: 5 years    Health Habit Summary:  Nutrition: Heart Healthy Eating:  most of the time   Exercise:  regularly active  Weight:  normal weight range  Tobacco Use:  never used    This case was presented to Dr. Jones and Dr. Yoko Nielsen during our weekly conference.     60 minutes spent on the date of the encounter doing (chart review/review of outside records/review of test results/interpretation of tests/patient visit/documentation/discussion with other provider(s)   MANUEL Ren CNP       CC  Patient Care Team:  Liana Goyal APRN CNP as PCP - General (Nurse Practitioner - Family)  Liana Goyal APRN CNP as Assigned PCP  Luz Figueroa MD as MD (Dermatology)  Franklin Jones MD as MD (Cardiovascular Disease)  Fatmata Allen APRN CNP as Nurse Practitioner (Cardiovascular Disease)  Franklin Jones MD as Assigned Heart and Vascular Provider  Jann Boyd DPM as Assigned Surgical Provider  Luz Figueroa MD as  Assigned Dermatology Provider  SELF, REFERRED      Martinez Test Results    WALKING BLOOD PRESSURE RESPONSE (3 minute, 5 MET level walk)   Pre BP: 112/72 mmHg  3 min BP: 124/70 mmHg  1 min post BP: 100/68 mmHg    Pre HR: 70 bpm  3 min HR: 120 bpm  1 min post HR: 68 bpm     Test results: Walking blood pressure response to 3 minutes activity is in normal range.     RETINAL VASCULAR ASSESSMENT   Left Eye Abnormality:  none  AV Ratio: 0.8    Right Eye Abnormality:  none  AV Ratio: 0.8     Retinal Assessment:  normal    ABDOMINAL AORTA ULTRASOUND (< 2.5 normal, borderline 2.5-2.9, abnormal > 3)   SupraIliac 1.73 cm    SupraRenal 1.63 cm    InfraRenal Proximal 1.79 cm    InfraRenal Distal 1.73 cm      Abdominal Aorta Assessment:  normal    LEFT VENTRICULAR ULTRASOUND MEASUREMENTS (adjusted for BSA)  LVIDD 41.4 mm   Septa 9 mm   Posterior 9 mm     Left Ventricular US Assessment:  normal    Carotid Artery IMT measurements report and plaques in the small area examined:   Left IMT 0.563 mm  Plaques none    Right IMT 0.675 mm  Plaques none     Test results: Carotid arteries wall thickening is in normal range with no plaque formations present.     ECG (see tracing):  normal sinus rhythm;  rate: 91 bpm    Arterial Elasticity per age and gender (see printout):   C1 11.8 mL/mmHg x 10  normal   C2 5.2 mL/mmHg x 100 normal   Supine blood pressure: 120/73 mmHg     Test results: Arterial elasticity of the large and small size arteries is in normal range range after adjusting for age and gender.     Martinez disease score:2     Maureen Campuzano      Please do not hesitate to contact me if you have any questions/concerns.     Sincerely,     MANUEL Ren CNP

## 2025-04-21 NOTE — PROGRESS NOTES
[facility] Dermatology Note  Encounter Date: Apr 23, 2025  Office Visit     Dermatology Problem List:    # atypical junctional melanocytic hyperplasia, L proximal forearm, s/p shave bx 6/7/24. S/p excision 08/21/2024  # Family history melanoma (father).  # Acrochordon, right posterior thigh, s/p biopsy 7/16/21    # Lesion to monitor  - R inner thigh 4 x 4.5 mm  - L parietal scalp, 2 mm homogenous macule. Blue nevus vs SK  ____________________________________________    Assessment & Plan:     # Lesion to monitor- R inner thigh 4 x 4.5 mm  - Measuring 4 x 5 mm 04/23/2025. Suspect margin of error. Noted to be 4 mm in 2021, no second dimension of measurement given at that time. Overall, appears unchanged from prior and dermoscopy very nicely reticular network    # LTM  - L parietal scalp, 2 mm homogenous macule. Blue nevus vs SK    # Benign lesions - SKs, cherry angiomas, lentigenes.  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 Some streaky pigment, suspect reactive.  - photo today of scar  - Continue photoprotection - recommend SPF 30 or higher with frequent reapplication  - Continue yearly skin exams  - Advised to monitor for changing, non-healing, bleeding, painful, changing, or otherwise symptomatic lesions      Procedures Performed:   N/A    Follow-up: 6 months in-person, or earlier for new or changing lesions  - scheduled 11/12/2025    Staff and Scribe:    Scribe Disclosure:   I, Margoth Morin, am serving as a scribe; to document services personally performed by Luz Figueroa MD -based on data collection and the provider's statements to me.     Provider Disclosure:   The documentation recorded by the scribe accurately reflects the services I  personally performed and the decisions made by me.    Luz Figueroa MD    Department of Dermatology  [facility]: Phone: [phone], Fax: [phone]  4/28/2025       ____________________________________________    CC: Skin Check (6 month FBSE and recheck lesion on R inner thigh)    HPI:  Ms. Gerardo Mandujano is a(n) 54 year old female who presents today as a return patient for [facility].    The patient reports that she has no specific skin concerns today.  Has not noted any changes to the LTM on R thigh.     Patient is otherwise feeling well, without additional skin concerns.    Labs Reviewed:  N/A    Physical exam:  Vitals: LMP 11/05/2012   GEN: This is a well developed, well-nourished female in no acute distress, in a pleasant mood.    SKIN: Total skin excluding the undergarment areas was performed. The exam included the head/face, neck, both arms, chest, back, abdomen, both legs, digits and/or nails.   - L parietal scalp, 2 mm homogenous macule.   - R inner thigh 4 x 5 mm even light brown macule superiorly with slight pedicle, dermoscopy very evenly reticulated. Unchanged today.   - R forearm, well healed scar centrally and distally with some even streaky pigment   - There are dome shaped bright red papules on the trunk and extremities .   - Multiple regular brown pigmented macules and papules are identified on the trunk and extremities. .   - Scattered brown macules on sun exposed areas.  - Waxy stuck on papules and plaques on trunk and extremities.   - No other lesions of concern on areas examined.     Medications:  Current Outpatient Medications   Medication Sig Dispense Refill    Calcium-Magnesium-Zinc 333-133-5 MG TABS per tablet Take 1 tablet by mouth daily      ibuprofen 200 MG capsule Take 200 mg by mouth as needed      lisinopril (ZESTRIL) 10 MG tablet Take 1 tablet (10 mg) by mouth daily. 90 tablet 3    VITAMIN  D, CHOLECALCIFEROL, PO Take  by mouth daily.       No current facility-administered medications for this visit.      Past Medical History:   Patient Active Problem List   Diagnosis    CARDIOVASCULAR SCREENING; LDL GOAL LESS THAN 160    Headache    Spasm of muscle    Arthralgia of temporomandibular joint    Benign essential hypertension    Left leg weakness    Weakness of left foot    Postural urinary incontinence    Pelvic floor dysfunction    Acute low back pain, unspecified back pain laterality, unspecified whether sciatica present     Past Medical History:   Diagnosis Date    Allergies     Seasonal    Benign essential hypertension     Hyperlipidemia LDL goal <100     Tension headache         CC Referred Self, MD  No address on file on close of this encounter.

## 2025-04-23 ENCOUNTER — OFFICE VISIT (OUTPATIENT)
Dept: DERMATOLOGY | Facility: CLINIC | Age: 55
End: 2025-04-23
Payer: COMMERCIAL

## 2025-04-23 DIAGNOSIS — L82.1 SEBORRHEIC KERATOSES: ICD-10-CM

## 2025-04-23 DIAGNOSIS — Z86.018 HISTORY OF DYSPLASTIC NEVUS: ICD-10-CM

## 2025-04-23 DIAGNOSIS — L81.4 SOLAR LENTIGO: ICD-10-CM

## 2025-04-23 DIAGNOSIS — D22.9 MULTIPLE BENIGN NEVI: ICD-10-CM

## 2025-04-23 DIAGNOSIS — Z12.83 SKIN CANCER SCREENING: Primary | ICD-10-CM

## 2025-04-23 DIAGNOSIS — Z80.8 FAMILY HISTORY OF MELANOMA: ICD-10-CM

## 2025-04-23 DIAGNOSIS — D18.01 CHERRY ANGIOMA: ICD-10-CM

## 2025-04-23 ASSESSMENT — PAIN SCALES - GENERAL: PAINLEVEL_OUTOF10: NO PAIN (0)

## 2025-04-23 NOTE — NURSING NOTE
Dermatology Rooming Note    Gerardo Mandujano's goals for this visit include:   Chief Complaint   Patient presents with    Skin Check     6 month FBSE and recheck lesion on R inner thigh     Avtar DIAZ CMA - Dermatology

## 2025-04-23 NOTE — LETTER
4/23/2025       RE: Gerardo Mandujano  [address]  [facility] [address]     Dear Colleague,    Thank you for referring your patient, Gerardo Mandujano, to the [facility] DERMATOLOGY CLINIC [place] at [facility]. Please see a copy of my visit note below.    [facility] Dermatology Note  Encounter Date: Apr 23, 2025  Office Visit     Dermatology Problem List:    # atypical junctional melanocytic hyperplasia, L proximal forearm, s/p shave bx 6/7/24. S/p excision 08/21/2024  # Family history melanoma (father).  # Acrochordon, right posterior thigh, s/p biopsy 7/16/21    # Lesion to monitor  - R inner thigh 4 x 4.5 mm  - L parietal scalp, 2 mm homogenous macule. Blue nevus vs SK  ____________________________________________    Assessment & Plan:     # Lesion to monitor- R inner thigh 4 x 4.5 mm  - Measuring 4 x 5 mm 04/23/2025. Suspect margin of error. Noted to be 4 mm in 2021, no second dimension of measurement given at that time. Overall, appears unchanged from prior and dermoscopy very nicely reticular network    # LTM  - L parietal scalp, 2 mm homogenous macule. Blue nevus vs SK    # Benign lesions - SKs, cherry angiomas, lentigenes.  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 Some streaky pigment, suspect reactive.  - photo today of scar  - Continue photoprotection - recommend SPF 30 or higher with frequent reapplication  - Continue yearly skin exams  - Advised to monitor for changing, non-healing, bleeding, painful, changing, or otherwise symptomatic lesions      Procedures Performed:   N/A    Follow-up: 6 months in-person, or earlier for new or changing lesions  - scheduled 11/12/2025    Staff and  Scribe:    Scribe Disclosure:   I, Margoth Morin, am serving as a scribe; to document services personally performed by Luz Figueroa MD -based on data collection and the provider's statements to me.     Provider Disclosure:   The documentation recorded by the scribe accurately reflects the services I personally performed and the decisions made by me.    Luz Figueroa MD    Department of Dermatology  [facility]: Phone: [phone], Fax: [phone]  4/28/2025       ____________________________________________    CC: Skin Check (6 month FBSE and recheck lesion on R inner thigh)    HPI:  Ms. Gerardo Mandujano is a(n) 54 year old female who presents today as a return patient for [facility].    The patient reports that she has no specific skin concerns today.  Has not noted any changes to the LTM on R thigh.     Patient is otherwise feeling well, without additional skin concerns.    Labs Reviewed:  N/A    Physical exam:  Vitals: LMP 11/05/2012   GEN: This is a well developed, well-nourished female in no acute distress, in a pleasant mood.    SKIN: Total skin excluding the undergarment areas was performed. The exam included the head/face, neck, both arms, chest, back, abdomen, both legs, digits and/or nails.   - L parietal scalp, 2 mm homogenous macule.   - R inner thigh 4 x 5 mm even light brown macule superiorly with slight pedicle, dermoscopy very evenly reticulated. Unchanged today.   - R forearm, well healed scar centrally and distally with some even streaky pigment   - There are dome shaped bright red papules on the trunk and extremities .   - Multiple regular brown pigmented macules and papules are identified on the trunk and extremities. .   - Scattered brown macules on sun exposed areas.  - Waxy stuck on papules and plaques on trunk and extremities.   - No other lesions of concern on areas examined.      Medications:  Current Outpatient Medications   Medication Sig Dispense Refill     Calcium-Magnesium-Zinc 333-133-5 MG TABS per tablet Take 1 tablet by mouth daily       ibuprofen 200 MG capsule Take 200 mg by mouth as needed       lisinopril (ZESTRIL) 10 MG tablet Take 1 tablet (10 mg) by mouth daily. 90 tablet 3     VITAMIN D, CHOLECALCIFEROL, PO Take  by mouth daily.       No current facility-administered medications for this visit.      Past Medical History:   Patient Active Problem List   Diagnosis     CARDIOVASCULAR SCREENING; LDL GOAL LESS THAN 160     Headache     Spasm of muscle     Arthralgia of temporomandibular joint     Benign essential hypertension     Left leg weakness     Weakness of left foot     Postural urinary incontinence     Pelvic floor dysfunction     Acute low back pain, unspecified back pain laterality, unspecified whether sciatica present     Past Medical History:   Diagnosis Date     Allergies     Seasonal     Benign essential hypertension      Hyperlipidemia LDL goal <100      Tension headache         CC Referred Self, MD  No address on file on close of this encounter.      Again, thank you for allowing me to participate in the care of your patient.      Sincerely,    Luz Figueroa MD

## 2025-04-23 NOTE — PATIENT INSTRUCTIONS

## 2025-04-24 NOTE — PATIENT INSTRUCTIONS
Screening Results Summary Report     [facility] Cardiovascular Disease Prevention    Thank you for choosing to participate in the prevention screening offered at the [facility]. Prevention screening is important part of health care.  Atherosclerosis may result in heart attacks, strokes, heart failure, peripheral artery disease and shortened life expectancy. The risk for premature development of this disease is both genetic (family history) and environmental (diet, exercise, lifestyle, etc.).  Goals of your cardiovascular prevention screening include detecting the earliest signs of blood vessel or heart abnormalities, and identifying markers for risk that can be treated if identified early. Recommendations are included to improve health habits. In some cases medication may be recommended to help slow progression of disease. Our goal is to assist you in prevention of a heart attack, stroke and other cardiovascular diseases that are the major cause of illness and mortality in our society.    Your total cholesterol and LDL (bad cholesterol) results are in the  optimal  range. Your other cholesterol numbers are also at goal.  We recommend continuation of ongoing health habit modification (heart healthy nutrition, maintaining your weight within a normal weight range and an exercise routine) to maintain these levels.  An ideal weight range is a body mass index of 25 or less. Your lipoprotein (a) level is elevated. We would like to see the results of your coronary artery calcium scan.    2.  Your arterial elasticity (artery stiffness) is low and is consistent with abnormalities associated with the development of vascular (blood vessel) disease and high blood pressure. Statin cholesterol medications, some blood pressure medications and healthy living habits particularly exercise are helpful to preserve artery elasticity. Monitor your blood pressure once/week, record the results and bring those results with  you to your next primary care clinic visit.  Notify your primary care provider if you notice that your blood pressure readings are increasing. Omron is good home blood pressure monitor brand to purchase.  This blood pressure cuff machine can be purchased at [employer] or [employer].     3.  Your resting blood pressures readings are elevated.  We recommend checking your blood pressure one/week, recording the results and bringing these results with you to your next clinic visit with your primary care provider.  A target blood pressure of 130/80mmHg or less for adequate BP control and 120/80 mmHg or less for optimal control.  Omron is good home blood pressure monitor brand to purchase.  This blood pressure cuff machine can be purchased at [employer] or [employer].      4. A coronary artery calcium scan is recommended to further assess your cardiovascular risk. This test may or may not be covered by insurance and costs approximately $150 ($160 with tax) and can be obtained at a variety of cardiovascular centers. If lung nodules are observed with this test follow up scans may be needed for further evaluation. An order for this diagnostic test has been placed in your electronic medical record.  Call [facility] at [phone] to schedule this non-invasive diagnostic test.     5. Your diet is heart healthy and well balanced.  We recommend increasing your intake of vegetables to 4-5 servings/day and increasing your fruit intake to 3-4 servings/day and incorporating healthy fats of the the Mediterranean diet into your diet. Eating salmon and using extra virgin olive oil are good examples. Nutrients found in fruits, vegetables and whole grains have been shown to be beneficial for the long-term health of your heart and blood vessels.     6.  Great job with your regular exercise regimen!  Regular exercise can help lower cholesterol, blood pressure, blood glucose and improve the health of your heart  and blood vessels. The American Heart Association recommends 150 minutes of exercise per week, including strength (resistance training).  Always exercise within your comfort zone (no chest pain, able to talk comfortably).    7.  We suggest that you consider incorporating 4-7-8 relaxation breathing, mindfulness stress reduction, meditation, yoga,and/or aromatherapy into your healthy lifestyle routine. All of these integrative therapies have been shown to be useful in reducing stress and promoting health. The website for the [facility] at the [facility] is [email or web address]. Taking Charge of Your Health and Wellbeing is a wonderful assessment tool to learn more about your wellbeing.    8.  Return to clinic in 5 years.    Thank you for choosing to participate in the prevention screening at [facility] CV Prevention clinic.  Cardiovascular prevention screening is important. Atherosclerosis may result in heart attacks, strokes, heart failure, peripheral artery disease.    Fatmata Allen, DNP, APRN, FNP-C

## 2025-09-04 ENCOUNTER — MYC MEDICAL ADVICE (OUTPATIENT)
Dept: FAMILY MEDICINE | Facility: CLINIC | Age: 55
End: 2025-09-04
Payer: COMMERCIAL